# Patient Record
Sex: FEMALE | Race: WHITE | NOT HISPANIC OR LATINO | Employment: OTHER | URBAN - METROPOLITAN AREA
[De-identification: names, ages, dates, MRNs, and addresses within clinical notes are randomized per-mention and may not be internally consistent; named-entity substitution may affect disease eponyms.]

---

## 2017-01-10 ENCOUNTER — OFFICE VISIT (OUTPATIENT)
Dept: INTERNAL MEDICINE | Facility: CLINIC | Age: 72
End: 2017-01-10

## 2017-01-10 VITALS
HEART RATE: 61 BPM | TEMPERATURE: 98.8 F | BODY MASS INDEX: 29.89 KG/M2 | OXYGEN SATURATION: 97 % | WEIGHT: 158.3 LBS | DIASTOLIC BLOOD PRESSURE: 66 MMHG | HEIGHT: 61 IN | SYSTOLIC BLOOD PRESSURE: 144 MMHG

## 2017-01-10 DIAGNOSIS — J30.89 ALLERGIC RHINITIS DUE TO OTHER ALLERGIC TRIGGER, UNSPECIFIED RHINITIS SEASONALITY: Primary | ICD-10-CM

## 2017-01-10 PROBLEM — J30.9 ALLERGIC RHINITIS DUE TO ALLERGEN: Status: ACTIVE | Noted: 2017-01-10

## 2017-01-10 PROCEDURE — 99213 OFFICE O/P EST LOW 20 MIN: CPT | Performed by: FAMILY MEDICINE

## 2017-01-10 RX ORDER — FLUTICASONE PROPIONATE 50 MCG
2 SPRAY, SUSPENSION (ML) NASAL DAILY
Qty: 1 EACH | Refills: 0 | Status: SHIPPED | OUTPATIENT
Start: 2017-01-10 | End: 2017-02-09

## 2017-01-10 NOTE — PROGRESS NOTES
"Subjective   Sera Mccord is a 71 y.o. female.     Chief Complaint   Patient presents with   • patient has been hurting all over her body     since Sunday   • patient has had a headache since Sunday   • both ears feel \"stopped up\"         History of Present Illness patient has had above symptoms since Sunday no more headache and body ache however persistent head congestion she feels that it could be allergies she has not had any fevers does not take any statins because of unwanted cardiovascular side effects    The following portions of the patient's history were reviewed and updated as appropriate: allergies, current medications, past family history, past medical history, past social history, past surgical history and problem list.    Review of Systems   Constitutional: Positive for fatigue. Negative for activity change and unexpected weight change.   HENT: Positive for congestion, postnasal drip and sore throat. Negative for ear pain.    Eyes: Negative for pain and discharge.   Respiratory: Positive for cough. Negative for chest tightness, shortness of breath and wheezing.    Cardiovascular: Negative for chest pain and palpitations.   Gastrointestinal: Negative for abdominal pain, diarrhea and vomiting.   Endocrine: Negative.    Genitourinary: Negative.    Musculoskeletal: Negative for joint swelling.   Skin: Negative for color change, rash and wound.   Allergic/Immunologic: Negative.    Neurological: Negative for seizures and syncope.   Psychiatric/Behavioral: Negative.        Objective   Physical Exam   Constitutional: She is oriented to person, place, and time. She appears well-developed and well-nourished.  Non-toxic appearance. No distress.   HENT:   Head: Normocephalic and atraumatic. Hair is normal.   Right Ear: External ear normal. No drainage, swelling or tenderness. Tympanic membrane is retracted.   Left Ear: External ear normal. No drainage, swelling or tenderness. Tympanic membrane is retracted. " "  Nose: Mucosal edema present. No epistaxis.   Mouth/Throat: Uvula is midline and mucous membranes are normal. No oral lesions. No uvula swelling. Posterior oropharyngeal erythema present. No oropharyngeal exudate.   Eyes: Conjunctivae and EOM are normal. Pupils are equal, round, and reactive to light. Right eye exhibits no discharge. Left eye exhibits no discharge. No scleral icterus.   Neck: Normal range of motion. Neck supple.   Cardiovascular: Normal rate, regular rhythm and normal heart sounds.  Exam reveals no gallop.    No murmur heard.  Pulmonary/Chest: Breath sounds normal. No stridor. No respiratory distress. She has no wheezes. She has no rales. She exhibits no tenderness.   Lymphadenopathy:     She has no cervical adenopathy.   Neurological: She is alert and oriented to person, place, and time. She exhibits normal muscle tone.   Skin: Skin is warm and dry. No rash noted. She is not diaphoretic.   Psychiatric: She has a normal mood and affect. Her behavior is normal. Judgment and thought content normal.   Nursing note and vitals reviewed.      Assessment/Plan   Sera was seen today for patient has been hurting all over her body, patient has had a headache since sunday and both ears feel \"stopped up\".    Diagnoses and all orders for this visit:    Allergic rhinitis due to other allergic trigger, unspecified rhinitis seasonality    Other orders  -     fluticasone (FLONASE) 50 MCG/ACT nasal spray; 2 sprays into each nostril Daily for 30 days.        Follow-up if symptoms persist       "

## 2017-01-10 NOTE — MR AVS SNAPSHOT
Sera Mccord   1/10/2017 1:00 PM   Office Visit    Dept Phone:  [phone]   Encounter #:  [id number]    Provider:  Parmjit Tidwell MD   Department:  [facility] AND [facility] MED                Your Full Care Plan              Today's Medication Changes          These changes are accurate as of: 1/10/17  2:35 PM.  If you have any questions, ask your nurse or doctor.               New Medication(s)Ordered:     fluticasone 50 MCG/ACT nasal spray   Commonly known as:  FLONASE   2 sprays into each nostril Daily for 30 days.   Started by:  Parmjit Tidwell MD         Stop taking medication(s)listed here:     meloxicam 7.5 MG tablet   Commonly known as:  MOBIC   Stopped by:  Parmjit Tidwell MD                Where to Get Your Medications      These medications were sent to [address] [address] [facility] - [phone] [facility] [phone]   [address] [place], [place] [address]     Phone:  [phone]     fluticasone 50 MCG/ACT nasal spray                  Your Updated Medication List          This list is accurate as of: 1/10/17  2:35 PM.  Always use your most recent med list.                aspirin 325 MG tablet       fluticasone 50 MCG/ACT nasal spray   Commonly known as:  FLONASE   2 sprays into each nostril Daily for 30 days.       simvastatin 20 MG tablet   Commonly known as:  ZOCOR               You Were Diagnosed With        Codes Comments    Allergic rhinitis due to other allergic trigger, unspecified rhinitis seasonality    -  Primary ICD-10-CM: J30.89  ICD-9-CM: 477.8       Instructions     None    Patient Instructions History      Upcoming Appointments     Visit Type Date Time Department    OFFICE VISIT 1/10/2017  1:00 PM [facility] [place]    OFFICE VISIT 2/13/2017  9:30 AM [facility] [place]    FOLLOW UP 4/11/2017 11:40 AM [facility] SRIKANTH CHEST WO CONTRAST 6/14/2017 11:45 AM   [facility]    OFFICE VISIT 2017  1:30 PM [facility]      MyChart Signup     [facility] [employer] allows you to send messages to your doctor, view your test results, renew your prescriptions, schedule appointments, and more. To sign up, go to [email or web address] and click on the Sign Up Now link in the New User? box. Enter your [employer] Activation Code exactly as it appears below along with the last four digits of your Social Security Number and your Date of Birth () to complete the sign-up process. If you do not sign up before the expiration date, you must request a new code.    [employer] Activation Code: [email or web address]  Expires: 2017  2:35 PM    If you have questions, you can email [email or web address] or call [phone] to talk to our [employer] staff. Remember, [employer] is NOT to be used for urgent needs. For medical emergencies, dial 911.               Other Info from Your Visit           Your Appointments     2017  9:30 AM EST   Office Visit with Parmjit Tidwell MD   [facility] FAMILY AND INTERNAL MED (--)    [address]  [facility] [address] [phone]           Arrive 15 minutes prior to appointment.            2017 11:40 AM EDT   Follow Up with aMngo Linda MD   [facility] CARDIOLOGY (--)    [address]  [facility] [address] [phone]           Arrive 15 minutes prior to appointment.            2017 11:45 AM EDT   CT fang chest wo contrast with FANG CT 2   [facility] ([place])    [address]  [facility] [id number] [phone]           Bring current list of meds Please arrive 15 minutes prior to exam            2017  1:30 PM EDT   Office Visit with Filomena Contreras, APRN   [facility] THORACIC SURGERY (--)    [address]  [facility] [address] [phone]           Arrive 15 minutes prior to  "appointment.              Allergies     Mobic [Meloxicam]  Other (See Comments), GI Intolerance    increased heart rate and elevated blood pressure    Codeine Allergy Nausea And Vomiting    Levofloxacin Allergy Swelling    Sulfa Antibiotics Allergy Nausea And Vomiting      Reason for Visit     patient has been hurting all over her body since Sunday    patient has had a headache since Sunday     both ears feel \"stopped up\"           Vital Signs     Blood Pressure Pulse Temperature Height Weight Oxygen Saturation    144/66 (BP Location: Left arm, Patient Position: Sitting, Cuff Size: Large Adult) 61 98.8 °F (37.1 °C) (Oral) 61\" (154.9 cm) 158 lb 4.8 oz (71.8 kg) 97%    Breastfeeding? Body Mass Index Smoking Status             No 29.91 kg/m2 Never Smoker         Problems and Diagnoses Noted     Nasal inflammation due to allergen        "

## 2017-02-13 ENCOUNTER — OFFICE VISIT (OUTPATIENT)
Dept: INTERNAL MEDICINE | Facility: CLINIC | Age: 72
End: 2017-02-13

## 2017-02-13 VITALS
HEART RATE: 48 BPM | WEIGHT: 160.5 LBS | OXYGEN SATURATION: 98 % | SYSTOLIC BLOOD PRESSURE: 168 MMHG | DIASTOLIC BLOOD PRESSURE: 68 MMHG | BODY MASS INDEX: 30.3 KG/M2 | TEMPERATURE: 98.2 F | HEIGHT: 61 IN

## 2017-02-13 DIAGNOSIS — M79.7 FIBROMYALGIA: Primary | ICD-10-CM

## 2017-02-13 DIAGNOSIS — F41.9 ANXIETY: ICD-10-CM

## 2017-02-13 DIAGNOSIS — E78.2 MIXED HYPERLIPIDEMIA: ICD-10-CM

## 2017-02-13 PROCEDURE — 99214 OFFICE O/P EST MOD 30 MIN: CPT | Performed by: FAMILY MEDICINE

## 2017-02-13 RX ORDER — DULOXETIN HYDROCHLORIDE 60 MG/1
60 CAPSULE, DELAYED RELEASE ORAL DAILY
Qty: 30 CAPSULE | Refills: 6 | Status: SHIPPED | OUTPATIENT
Start: 2017-02-13 | End: 2017-02-24 | Stop reason: SINTOL

## 2017-02-13 RX ORDER — DULOXETIN HYDROCHLORIDE 30 MG/1
30 CAPSULE, DELAYED RELEASE ORAL DAILY
Qty: 15 CAPSULE | Refills: 0 | Status: SHIPPED | OUTPATIENT
Start: 2017-02-13 | End: 2017-02-13 | Stop reason: SDUPTHER

## 2017-02-13 RX ORDER — SIMVASTATIN 20 MG
20 TABLET ORAL NIGHTLY
Qty: 90 TABLET | Refills: 3 | Status: SHIPPED | OUTPATIENT
Start: 2017-02-13 | End: 2018-02-14 | Stop reason: ALTCHOICE

## 2017-02-13 NOTE — PROGRESS NOTES
Subjective   Sera Mccord is a 71 y.o. female.     Chief Complaint   Patient presents with   • follow up to fibromyalgia    hyperlipidemia, anxiety      Anxiety   Presents for initial visit. Onset was 1 to 5 years ago. The problem has been gradually worsening. Symptoms include decreased concentration, depressed mood, insomnia and irritability. Patient reports no chest pain, confusion, nervous/anxious behavior, palpitations or shortness of breath. Symptoms occur most days. The severity of symptoms is moderate. The symptoms are aggravated by family issues. The patient sleeps 4 hours per night. The quality of sleep is poor. Nighttime awakenings: several.     There are no known risk factors. Her past medical history is significant for CAD. Past treatments include nothing.    patient has history of syndrome X small coronary artery disease no beta blocker secondary to bradycardia is taking statin therapy and doing well trying to watch her diet also  No chest pain echocardiogram most recently ejection fraction greater than 60  She has chronic aches and pains with some intermittent osteoarthritis flareups she has difficulty taking anti-inflammatory she stopped the Mobic because of upset stomach she complains mostly of nighttime aches and pains wrist shoulders hips has some difficulty sleeping as a result she is stressed because of her daughter who's 48 has moved home with her son 18 years old smoker  The following portions of the patient's history were reviewed and updated as appropriate: allergies, current medications, past family history, past medical history, past social history, past surgical history and problem list.    Review of Systems   Constitutional: Positive for irritability. Negative for activity change, appetite change and unexpected weight change.   HENT: Negative for nosebleeds and trouble swallowing.    Eyes: Negative for pain and visual disturbance.   Respiratory: Negative for chest tightness, shortness  of breath and wheezing.    Cardiovascular: Negative for chest pain and palpitations.   Gastrointestinal: Negative for abdominal pain and blood in stool.   Endocrine: Negative.    Genitourinary: Negative for difficulty urinating and hematuria.   Musculoskeletal: Positive for arthralgias, myalgias and neck pain. Negative for gait problem and joint swelling.   Skin: Negative for color change and rash.   Allergic/Immunologic: Negative.    Neurological: Negative for syncope and speech difficulty.   Hematological: Negative for adenopathy.   Psychiatric/Behavioral: Positive for decreased concentration. Negative for agitation, confusion and dysphoric mood. The patient has insomnia. The patient is not nervous/anxious.    All other systems reviewed and are negative.      Objective   Physical Exam   Constitutional: She is oriented to person, place, and time. She appears well-developed and well-nourished.   HENT:   Head: Normocephalic and atraumatic.   Eyes: Conjunctivae are normal. Pupils are equal, round, and reactive to light.   Neck: Neck supple. No thyromegaly present.   Cardiovascular: Regular rhythm and normal pulses.  Bradycardia present.    Pulmonary/Chest: Effort normal and breath sounds normal. No respiratory distress. She has no wheezes.   Neurological: She is alert and oriented to person, place, and time.   Skin: Skin is warm and dry.   Psychiatric: She has a normal mood and affect. Her behavior is normal. Judgment and thought content normal.   Nursing note and vitals reviewed.      Assessment/Plan   Sera was seen today for follow up to fibromyalgia.    Diagnoses and all orders for this visit:    Fibromyalgia    Mixed hyperlipidemia    Anxiety    Other orders  -     Discontinue: DULoxetine (CYMBALTA) 30 MG capsule; Take 1 capsule by mouth Daily.  -     DULoxetine (CYMBALTA) 60 MG capsule; Take 1 capsule by mouth Daily.  -     simvastatin (ZOCOR) 20 MG tablet; Take 1 tablet by mouth Every Night.     follow-up 2  months benefit  of Cymbalta, then fasting lipid profile in June  Recommend Aleve 1 daily with dinner  Increase physical activity walking

## 2017-02-17 ENCOUNTER — HOSPITAL ENCOUNTER (EMERGENCY)
Facility: HOSPITAL | Age: 72
Discharge: HOME | End: 2017-02-17
Attending: FAMILY MEDICINE | Admitting: EMERGENCY MEDICINE

## 2017-02-17 ENCOUNTER — APPOINTMENT (OUTPATIENT)
Dept: GENERAL RADIOLOGY | Facility: HOSPITAL | Age: 72
End: 2017-02-17

## 2017-02-17 VITALS
TEMPERATURE: 98.8 F | OXYGEN SATURATION: 98 % | HEART RATE: 50 BPM | RESPIRATION RATE: 15 BRPM | HEIGHT: 61 IN | SYSTOLIC BLOOD PRESSURE: 162 MMHG | BODY MASS INDEX: 30.02 KG/M2 | WEIGHT: 159 LBS | DIASTOLIC BLOOD PRESSURE: 70 MMHG

## 2017-02-17 DIAGNOSIS — I10 ESSENTIAL HYPERTENSION: Primary | ICD-10-CM

## 2017-02-17 LAB
ALBUMIN SERPL-MCNC: 4.7 G/DL (ref 3.5–5.2)
ALBUMIN/GLOB SERPL: 1.7 G/DL
ALP SERPL-CCNC: 73 U/L (ref 39–117)
ALT SERPL W P-5'-P-CCNC: 17 U/L (ref 1–33)
ANION GAP SERPL CALCULATED.3IONS-SCNC: 12.9 MMOL/L
AST SERPL-CCNC: 20 U/L (ref 1–32)
BASOPHILS # BLD AUTO: 0.01 10*3/MM3 (ref 0–0.2)
BASOPHILS NFR BLD AUTO: 0.2 % (ref 0–1.5)
BILIRUB SERPL-MCNC: 0.5 MG/DL (ref 0.1–1.2)
BUN BLD-MCNC: 13 MG/DL (ref 8–23)
BUN/CREAT SERPL: 16.5 (ref 7–25)
CALCIUM SPEC-SCNC: 9.6 MG/DL (ref 8.6–10.5)
CHLORIDE SERPL-SCNC: 103 MMOL/L (ref 98–107)
CO2 SERPL-SCNC: 26.1 MMOL/L (ref 22–29)
CREAT BLD-MCNC: 0.79 MG/DL (ref 0.57–1)
DEPRECATED RDW RBC AUTO: 46.9 FL (ref 37–54)
EOSINOPHIL # BLD AUTO: 0.08 10*3/MM3 (ref 0–0.7)
EOSINOPHIL NFR BLD AUTO: 1.4 % (ref 0.3–6.2)
ERYTHROCYTE [DISTWIDTH] IN BLOOD BY AUTOMATED COUNT: 13.9 % (ref 11.7–13)
GFR SERPL CREATININE-BSD FRML MDRD: 72 ML/MIN/1.73
GLOBULIN UR ELPH-MCNC: 2.7 GM/DL
GLUCOSE BLD-MCNC: 116 MG/DL (ref 65–99)
HCT VFR BLD AUTO: 44.1 % (ref 35.6–45.5)
HGB BLD-MCNC: 14.8 G/DL (ref 11.9–15.5)
IMM GRANULOCYTES # BLD: 0 10*3/MM3 (ref 0–0.03)
IMM GRANULOCYTES NFR BLD: 0 % (ref 0–0.5)
LYMPHOCYTES # BLD AUTO: 1.44 10*3/MM3 (ref 0.9–4.8)
LYMPHOCYTES NFR BLD AUTO: 25.1 % (ref 19.6–45.3)
MCH RBC QN AUTO: 31 PG (ref 26.9–32)
MCHC RBC AUTO-ENTMCNC: 33.6 G/DL (ref 32.4–36.3)
MCV RBC AUTO: 92.5 FL (ref 80.5–98.2)
MONOCYTES # BLD AUTO: 0.37 10*3/MM3 (ref 0.2–1.2)
MONOCYTES NFR BLD AUTO: 6.4 % (ref 5–12)
NEUTROPHILS # BLD AUTO: 3.84 10*3/MM3 (ref 1.9–8.1)
NEUTROPHILS NFR BLD AUTO: 66.9 % (ref 42.7–76)
PLATELET # BLD AUTO: 232 10*3/MM3 (ref 140–500)
PMV BLD AUTO: 10.6 FL (ref 6–12)
POTASSIUM BLD-SCNC: 4 MMOL/L (ref 3.5–5.2)
PROT SERPL-MCNC: 7.4 G/DL (ref 6–8.5)
RBC # BLD AUTO: 4.77 10*6/MM3 (ref 3.9–5.2)
SODIUM BLD-SCNC: 142 MMOL/L (ref 136–145)
TROPONIN T SERPL-MCNC: <0.01 NG/ML (ref 0–0.03)
WBC NRBC COR # BLD: 5.74 10*3/MM3 (ref 4.5–10.7)

## 2017-02-17 PROCEDURE — 71020 HC CHEST PA AND LATERAL: CPT

## 2017-02-17 PROCEDURE — 85025 COMPLETE CBC W/AUTO DIFF WBC: CPT | Performed by: PHYSICIAN ASSISTANT

## 2017-02-17 PROCEDURE — 93005 ELECTROCARDIOGRAM TRACING: CPT

## 2017-02-17 PROCEDURE — 99284 EMERGENCY DEPT VISIT MOD MDM: CPT

## 2017-02-17 PROCEDURE — 80053 COMPREHEN METABOLIC PANEL: CPT | Performed by: PHYSICIAN ASSISTANT

## 2017-02-17 PROCEDURE — 84484 ASSAY OF TROPONIN QUANT: CPT | Performed by: PHYSICIAN ASSISTANT

## 2017-02-17 PROCEDURE — 93005 ELECTROCARDIOGRAM TRACING: CPT | Performed by: PHYSICIAN ASSISTANT

## 2017-02-17 PROCEDURE — 93010 ELECTROCARDIOGRAM REPORT: CPT | Performed by: INTERNAL MEDICINE

## 2017-02-17 RX ORDER — SODIUM CHLORIDE 0.9 % (FLUSH) 0.9 %
10 SYRINGE (ML) INJECTION AS NEEDED
Status: DISCONTINUED | OUTPATIENT
Start: 2017-02-17 | End: 2017-02-17 | Stop reason: HOSPADM

## 2017-02-17 RX ORDER — LISINOPRIL 10 MG/1
10 TABLET ORAL DAILY
Qty: 30 TABLET | Refills: 0 | Status: SHIPPED | OUTPATIENT
Start: 2017-02-17 | End: 2017-02-24

## 2017-02-17 NOTE — ED NOTES
States her bp was 171/90 and HR 87 ( states her normal HR is usually in the 40's), chest feels flushed     Frances Hdz RN  02/17/17 0702

## 2017-02-17 NOTE — ED PROVIDER NOTES
" EMERGENCY DEPARTMENT ENCOUNTER    CHIEF COMPLAINT  Chief Complaint: HTN  History given by: Patient  History limited by: Nothing  Room Number: 06/06  PMD: Parmjit Tidwell MD,  (Cardiology)      HPI:  Pt is a 71 y.o. female who presents complaining of hypertension. The patient reports that she first noticed that her BP was unusually elevated yesterday and her BP was 179/90 05:30 this morning. Pt states that she developed a  \"flushed\" sensation across her arms and anterior chest wall PTA so she came to the ED for further evaluation. She denies any chest pain, SOA, vomiting, HA, dysuria, numbness or tingling since onset. The patient had similar symptoms a few years ago when she failed a stress test, and the heart cath at that time showed no occlusions. The patient was started on Cymbalta three days ago by her PCP. No other complaints at this time.      Duration:  1 day  Onset: Gradual  Timing: Constant  Location: Anterior chest wall  Radiation: Bilateral arms  Quality: \"Flushed\"  Intensity/Severity: Mild  Progression: No Change  Associated Symptoms: HTN  Aggravating Factors: Unknown  Alleviating Factors: None  Previous Episodes: Yes, several years ago  Treatment before arrival: None mentioned     PAST MEDICAL HISTORY  Active Ambulatory Problems     Diagnosis Date Noted   • Arteriosclerosis of coronary artery 03/04/2016   • Arthritis 06/14/2016   • Foot pain 06/14/2016   • Histoplasmosis 06/14/2016   • HLD (hyperlipidemia) 06/14/2016   • Cannot sleep 06/14/2016   • Lung mass 06/14/2016   • LAD (lymphadenopathy), mediastinal 06/14/2016   • Plantar fasciitis 06/14/2016   • Restless leg 06/14/2016   • Fibromyalgia 12/13/2016   • Hyperglycemia 12/13/2016   • Allergic rhinitis due to allergen 01/10/2017   • Anxiety 02/13/2017     Resolved Ambulatory Problems     Diagnosis Date Noted   • No Resolved Ambulatory Problems     Past Medical History   Diagnosis Date   • Abnormal finding on lung imaging    • Coronary " artery disease    • Hemorrhoids    • Hyperlipemia    • Hyperlipidemia    • IBS (irritable bowel syndrome)    • Insomnia    • Mediastinal lymphadenopathy    • Night sweat    • Restless leg syndrome    • Sore throat        PAST SURGICAL HISTORY  Past Surgical History   Procedure Laterality Date   • Upper gastrointestinal endoscopy  11/2015     Dr. Lauren Reich   • Cardiac catheterization  2015   • Colonoscopy w/ polypectomy  2013     Dr. Lauren Reich   • Colonoscopy N/A 10/24/2016     Procedure: COLONOSCOPY;  Surgeon: Lauren Reich MD;  Location: [facility] ENDOSCOPY;  Service:        FAMILY HISTORY  Family History   Problem Relation Age of Onset   • Hypertension Mother    • Heart disease Mother    • Heart disease Father    • Heart attack Father    • Skin cancer Maternal Grandmother    • No Known Problems Maternal Grandfather    • Arthritis Paternal Grandmother    • Heart disease Paternal Grandfather        SOCIAL HISTORY  Social History     Social History   • Marital status:      Spouse name: N/A   • Number of children: N/A   • Years of education: N/A     Occupational History   • Not on file.     Social History Main Topics   • Smoking status: Never Smoker   • Smokeless tobacco: Never Used   • Alcohol use No   • Drug use: No   • Sexual activity: Defer     Other Topics Concern   • Not on file     Social History Narrative       ALLERGIES  Mobic [meloxicam]; Codeine; Levofloxacin; and Sulfa antibiotics    REVIEW OF SYSTEMS  Review of Systems   Constitutional: Negative for chills and fatigue.   HENT: Negative for congestion, rhinorrhea and sore throat.    Eyes: Negative for pain.   Respiratory: Negative for cough, shortness of breath and wheezing.    Cardiovascular: Negative for chest pain, palpitations and leg swelling.   Gastrointestinal: Negative for abdominal pain, diarrhea, nausea and vomiting.   Genitourinary: Negative for difficulty urinating, dysuria, flank pain and frequency.   Musculoskeletal: Negative for  arthralgias, myalgias, neck pain and neck stiffness.   Skin: Negative for rash.   Neurological: Negative for dizziness, speech difficulty, weakness, light-headedness, numbness and headaches.   Psychiatric/Behavioral: Negative.    All other systems reviewed and are negative.      PHYSICAL EXAM  ED Triage Vitals   Temp Heart Rate Resp BP SpO2   02/17/17 0702 02/17/17 0702 02/17/17 0704 02/17/17 0708 02/17/17 0702   98.8 °F (37.1 °C) 92 15 211/90 98 %      Temp src Heart Rate Source Patient Position BP Location FiO2 (%)   -- -- -- -- --              Physical Exam   Constitutional: She is oriented to person, place, and time and well-developed, well-nourished, and in no distress. No distress.   HENT:   Head: Normocephalic.   Eyes: EOM are normal. Pupils are equal, round, and reactive to light.   Neck: Normal range of motion.   Cardiovascular: Regular rhythm.  Bradycardia present.    No murmur heard.  Pulmonary/Chest: Effort normal and breath sounds normal. No respiratory distress.   Abdominal: Soft. There is no tenderness. There is no rebound and no guarding.   Musculoskeletal: Normal range of motion. She exhibits no edema.   Neurological: She is alert and oriented to person, place, and time.   No neuro deficits    Skin: Skin is warm and dry. No rash noted.   No pedal edema.   Psychiatric: Mood and affect normal.   Nursing note and vitals reviewed.      LAB RESULTS  Lab Results (last 24 hours)     Procedure Component Value Units Date/Time    CBC & Differential [[id number]] Collected:  02/17/17 0814    Specimen:  Blood Updated:  02/17/17 0823    Narrative:       The following orders were created for panel order CBC & Differential.  Procedure                               Abnormality         Status                     ---------                               -----------         ------                     CBC Auto Differential[[id number]]         Abnormal            Final result                 Please view results for these  tests on the individual orders.    Comprehensive Metabolic Panel [[id number]]  (Abnormal) Collected:  02/17/17 0814    Specimen:  Blood Updated:  02/17/17 0850     Glucose 116 (H) mg/dL      BUN 13 mg/dL      Creatinine 0.79 mg/dL      Sodium 142 mmol/L      Potassium 4.0 mmol/L      Chloride 103 mmol/L      CO2 26.1 mmol/L      Calcium 9.6 mg/dL      Total Protein 7.4 g/dL      Albumin 4.70 g/dL      ALT (SGPT) 17 U/L      AST (SGOT) 20 U/L      Alkaline Phosphatase 73 U/L      Total Bilirubin 0.5 mg/dL      eGFR Non African Amer 72 mL/min/1.73      Globulin 2.7 gm/dL      A/G Ratio 1.7 g/dL      BUN/Creatinine Ratio 16.5      Anion Gap 12.9 mmol/L     Troponin [[id number]]  (Normal) Collected:  02/17/17 0814    Specimen:  Blood Updated:  02/17/17 0850     Troponin T <0.010 ng/mL     Narrative:       Troponin T Reference Ranges:  Less than 0.03 ng/mL:    Negative for AMI  0.03 to 0.09 ng/mL:      Indeterminant for AMI  Greater than 0.09 ng/mL: Positive for AMI    CBC Auto Differential [[id number]]  (Abnormal) Collected:  02/17/17 0814    Specimen:  Blood Updated:  02/17/17 0823     WBC 5.74 10*3/mm3      RBC 4.77 10*6/mm3      Hemoglobin 14.8 g/dL      Hematocrit 44.1 %      MCV 92.5 fL      MCH 31.0 pg      MCHC 33.6 g/dL      RDW 13.9 (H) %      RDW-SD 46.9 fl      MPV 10.6 fL      Platelets 232 10*3/mm3      Neutrophil % 66.9 %      Lymphocyte % 25.1 %      Monocyte % 6.4 %      Eosinophil % 1.4 %      Basophil % 0.2 %      Immature Grans % 0.0 %      Neutrophils, Absolute 3.84 10*3/mm3      Lymphocytes, Absolute 1.44 10*3/mm3      Monocytes, Absolute 0.37 10*3/mm3      Eosinophils, Absolute 0.08 10*3/mm3      Basophils, Absolute 0.01 10*3/mm3      Immature Grans, Absolute 0.00 10*3/mm3           I ordered the above labs and reviewed the results    RADIOLOGY  XR Chest 2 View   Final Result         08:56  Reviewed CXR - Chronic ill-defined opacification right upper lung is evaluated to better  advantage on CT scan. I  see no other abnormality, no focal airspace disease pleural fluid or other acute process. Independently viewed by me. Interpreted by radiologist.    I ordered the above noted radiological studies. Interpreted by radiologist.  Reviewed by me in PACS.       PROCEDURES  Procedures    EKG           EKG time: 08:20  Rhythm/Rate: SB 45  P waves and CA: Normal  QRS, axis: Boarder line LAD   ST and T waves: Non specific T wave changes in anterior leads       Interpreted Contemporaneously by me, independently viewed  Unchanged compared to prior 9/27/16      PROGRESS AND CONSULTS  ED Course     08:09  Ordered EKG, Labs and CXR for further evaluation    09:02  Rechecked patient and they are resting comfortably. The patient's flushed sensation has resolved. Discussed pertinent lab and imaging results, including her CXR, labs and EKG show nothing acute. Patient agrees with plan and all questions were addressed.     09:26  Discussed case with  and he agrees with the treatment plan. He would like me to contact her PCP.    09:49  Discussed case with Dr. Tidwell (PCP). He recommended that she stop Cymbalta and start Lisinopril 10 mg.  will see her in f/u next week. Reviewed history, exam, results and treatments.       10:00  Rechecked patient and they are resting comfortably. /73. I explained that I spoke with  and he recommended stopping Cymbalta and possibly starting her on a low dose of Lisinopril. Discussed the patient's pertinent labs and imaging results, including negative CXR, EKG and Labs. Discussed plan to discharge the patient home and recommended follow up with  (PCP) next week. Patient agrees with the plan and all questions were addressed.     Latest vital signs   BP- 162/70 HR- (!) 49 Temp- 98.8 °F (37.1 °C) O2 sat- 97%      MEDICAL DECISION MAKING  Results were reviewed/discussed with the patient and they were also made aware of online access. Pt also made aware that some labs, such  as cultures, will not be resulted during ER visit and follow up with PMD is necessary.     MDM  Number of Diagnoses or Management Options  Essential hypertension:   Diagnosis management comments: No evidence of ischemia or arrhythmia.         Amount and/or Complexity of Data Reviewed  Tests in the radiology section of CPT®: ordered and reviewed  Decide to obtain previous medical records or to obtain history from someone other than the patient: yes  Review and summarize past medical records: yes (Cardiac cath 1/15/2015 which was normal. She also had a normal echo at that time as well. Positive cardiac stress test. Negative echo 10/17/16. )           DIAGNOSIS  Final diagnoses:   Essential hypertension       DISPOSITION  DISCHARGE    Patient discharged in stable condition.    Reviewed implications of results, diagnosis, meds, responsibility to follow up, warning signs and symptoms of possible worsening, potential complications and reasons to return to ER, including worsening chest discomfort.    Patient/Family voiced understanding of above instructions.    Discussed plan for discharge, as there is no emergent indication for admission.  Pt/family is agreeable and understands need for follow up and repeat testing.  Pt is aware that discharge does not mean that nothing is wrong but it indicates no emergency is present that requires admission and they must continue care with follow-up as given below or physician of their choice.     FOLLOW-UP  Parmjit Tidwell MD  [id number] [id number] [phone]      call for appointment next week to check BP         Medication List      New Prescriptions          lisinopril 10 MG tablet   Commonly known as:  PRINIVIL,ZESTRIL   Take 1 tablet by mouth Daily.         Stop          DULoxetine 60 MG capsule   Commonly known as:  CYMBALTA       TURMERIC PO          Latest Documented Vital Signs:  As of 10:11 AM  BP- 162/70 HR- 50 Temp- 98.8 °F (37.1 °C) O2 sat-  98%    --  Documentation assistance provided by lars Soriano for Ricco Zamarripa PA-C.  Information recorded by the scribe was done at my direction and has been verified and validated by me.             Deangelo Soriano  02/17/17 1012       KATHIA Mcarthur  02/17/17 1019

## 2017-02-17 NOTE — ED PROVIDER NOTES
Pt presents to ED complaining of hypertension with a BP of 179/90 this morning. Pt also complains of HA. Pt states she has never had high BP before, so she does not take hypertension medicine. Dr. Tidwell (PCP) started pt on Cymbalta 3 days ago, but she denies recent decongestant use. Her most recent BP in the ED was 170/84. Her HR is in the 40's. Upon exam, pt is awake and alert in NAD. Heart and lungs are normal. Abd is benign. We will consult her PCP.     I supervised care provided by the midlevel provider.    We have discussed this patient's history, physical exam, and treatment plan.   I have reviewed the note and personally saw and examined the patient and agree with the plan of care.    Documentation assistance provided by lars Harris.  Information recorded by the scribe was done at my direction and has been verified and validated by me.       Michaela Harris  02/17/17 1008       Jonathan Epstein MD  02/17/17 1015

## 2017-02-20 ENCOUNTER — TELEPHONE (OUTPATIENT)
Dept: SOCIAL WORK | Facility: HOSPITAL | Age: 72
End: 2017-02-20

## 2017-02-24 ENCOUNTER — OFFICE VISIT (OUTPATIENT)
Dept: INTERNAL MEDICINE | Facility: CLINIC | Age: 72
End: 2017-02-24

## 2017-02-24 VITALS
SYSTOLIC BLOOD PRESSURE: 142 MMHG | HEART RATE: 53 BPM | DIASTOLIC BLOOD PRESSURE: 88 MMHG | TEMPERATURE: 98.5 F | OXYGEN SATURATION: 98 % | WEIGHT: 157 LBS | BODY MASS INDEX: 29.64 KG/M2 | HEIGHT: 61 IN

## 2017-02-24 DIAGNOSIS — R00.1 BRADYCARDIA: ICD-10-CM

## 2017-02-24 DIAGNOSIS — F41.9 ANXIETY: Primary | ICD-10-CM

## 2017-02-24 DIAGNOSIS — M79.7 FIBROMYALGIA: ICD-10-CM

## 2017-02-24 DIAGNOSIS — E78.2 MIXED HYPERLIPIDEMIA: ICD-10-CM

## 2017-02-24 PROCEDURE — 99214 OFFICE O/P EST MOD 30 MIN: CPT | Performed by: FAMILY MEDICINE

## 2017-02-24 RX ORDER — AMLODIPINE BESYLATE 5 MG/1
5 TABLET ORAL DAILY
Qty: 30 TABLET | Refills: 6 | Status: SHIPPED | OUTPATIENT
Start: 2017-02-24 | End: 2017-06-16 | Stop reason: SDUPTHER

## 2017-02-24 RX ORDER — AMLODIPINE BESYLATE 5 MG/1
5 TABLET ORAL DAILY
COMMUNITY
End: 2017-02-24 | Stop reason: SDUPTHER

## 2017-02-24 NOTE — PROGRESS NOTES
Subjective   Sera Mccord is a 71 y.o. female.     Chief Complaint   Patient presents with   • following up on hypertension     hospital follow up due toelevated blood pressure 2/17/17         History of Present Illness   Patient went to Hospital to 17 hot feeling chest pain with arm pain bilaterally elevated blood pressure she had been on the Cymbalta for 1 week prior to that.  Discharged with lisinopril prescription changed to amlodipine at a pharmacy history of allergic reaction to lisinopril which the patient does not know what it was.  Records were reviewed from [facility]  She is continuing to take the simvastatin for hyperlipidemia and has stopped taking Cymbalta  The following portions of the patient's history were reviewed and updated as appropriate: allergies, current medications, past family history, past medical history, past social history, past surgical history and problem list.    Review of Systems   Constitutional: Negative for activity change, appetite change and unexpected weight change.   HENT: Negative for nosebleeds and trouble swallowing.    Eyes: Negative for pain and visual disturbance.   Respiratory: Negative for chest tightness, shortness of breath and wheezing.    Cardiovascular: Negative for chest pain and palpitations.   Gastrointestinal: Negative for abdominal pain and blood in stool.   Endocrine: Negative.    Genitourinary: Negative for difficulty urinating and hematuria.   Musculoskeletal: Negative for arthralgias, gait problem, joint swelling, myalgias and neck pain.   Skin: Negative for color change and rash.   Allergic/Immunologic: Negative.    Neurological: Negative for syncope and speech difficulty.   Hematological: Negative for adenopathy.   Psychiatric/Behavioral: Negative for agitation, confusion and dysphoric mood. The patient is nervous/anxious.    All other systems reviewed and are negative.      Objective   Physical Exam   Constitutional: She is oriented to person,  place, and time. She appears well-developed and well-nourished.   HENT:   Head: Normocephalic and atraumatic.   Eyes: Conjunctivae are normal. Pupils are equal, round, and reactive to light.   Neck: Neck supple. No thyromegaly present.   Cardiovascular: Regular rhythm and normal pulses.  Bradycardia present.    Pulmonary/Chest: Effort normal and breath sounds normal. No respiratory distress. She has no wheezes.   Neurological: She is alert and oriented to person, place, and time.   Skin: Skin is warm and dry.   Psychiatric: She has a normal mood and affect. Her behavior is normal. Judgment and thought content normal.   Nursing note and vitals reviewed.      Assessment/Plan   Sera was seen today for following up on hypertension.    Diagnoses and all orders for this visit:    Anxiety    Fibromyalgia    Mixed hyperlipidemia    Bradycardia    Other orders  -     amLODIPine (NORVASC) 5 MG tablet; Take 1 tablet by mouth Daily.      Patient will follow-up in one month but her blood pressure goals less than 140/90 minute that time restart the Cymbalta to help because of her anxiety and fibromyalgia symptoms continue the simvastatin at 20 mg for hyperlipidemia  She's been released from cardiology

## 2017-03-24 ENCOUNTER — OFFICE VISIT (OUTPATIENT)
Dept: INTERNAL MEDICINE | Facility: CLINIC | Age: 72
End: 2017-03-24

## 2017-03-24 VITALS
SYSTOLIC BLOOD PRESSURE: 128 MMHG | WEIGHT: 158.8 LBS | HEART RATE: 50 BPM | OXYGEN SATURATION: 98 % | BODY MASS INDEX: 29.98 KG/M2 | TEMPERATURE: 98.1 F | DIASTOLIC BLOOD PRESSURE: 62 MMHG | HEIGHT: 61 IN

## 2017-03-24 DIAGNOSIS — M79.7 FIBROMYALGIA: ICD-10-CM

## 2017-03-24 DIAGNOSIS — I10 ESSENTIAL HYPERTENSION: ICD-10-CM

## 2017-03-24 DIAGNOSIS — E78.2 MIXED HYPERLIPIDEMIA: Primary | ICD-10-CM

## 2017-03-24 PROCEDURE — 99214 OFFICE O/P EST MOD 30 MIN: CPT | Performed by: FAMILY MEDICINE

## 2017-03-24 RX ORDER — MELATONIN
2000 DAILY
COMMUNITY
End: 2018-01-22

## 2017-03-24 NOTE — PROGRESS NOTES
Subjective   Sera Mccord is a 71 y.o. female.     Chief Complaint   Patient presents with   • following up hypertension    fibromyalgia  Hyperlipidemia    History of Present Illness has been checking her blood pressure in the morning and seems to be elevated in the 160s range after she takes amlodipine 3 hours later her blood pressures back down in the 1/21/30 range she has no chest pain shortness of breath or increase fatigue swelling seems to accumulate in her lower extremities since the day progresses is not as bad as it has been in the past but she just wanted to make note of the face had many intolerances to medications in the past due to leg swelling.  She is trying to increase her activity to help treat fibromyalgia since she cannot take anti-inflammatory sore SSRIs she is also watching her diet and continuing the statin therapy for cholesterol management she has no muscle aches of the usual trigger point tenderness    The following portions of the patient's history were reviewed and updated as appropriate: allergies, current medications, past family history, past medical history, past social history, past surgical history and problem list.    Review of Systems   Constitutional: Negative.    HENT: Negative.    Eyes: Negative.    Respiratory: Negative.    Cardiovascular: Negative.    Gastrointestinal: Negative.    Genitourinary: Negative.    Hematological: Negative.    Psychiatric/Behavioral: Negative.        Objective   Physical Exam   Constitutional: She is oriented to person, place, and time. She appears well-developed and well-nourished.   HENT:   Head: Normocephalic and atraumatic.   Eyes: Conjunctivae are normal. Pupils are equal, round, and reactive to light.   Neck: Neck supple. No thyromegaly present.   Cardiovascular: Regular rhythm and normal pulses.  Bradycardia present.    Pulmonary/Chest: Effort normal and breath sounds normal. No respiratory distress. She has no wheezes.   Musculoskeletal:  Normal range of motion.   Trace edema in ankles bilaterally   Neurological: She is alert and oriented to person, place, and time.   Skin: Skin is warm and dry.   Psychiatric: She has a normal mood and affect. Her behavior is normal. Judgment and thought content normal.   Nursing note and vitals reviewed.      Assessment/Plan   Sera was seen today for following up hypertension.    Diagnoses and all orders for this visit:    Mixed hyperlipidemia    Essential hypertension    Fibromyalgia      Adjustment medication of amlodipine half pill twice a day every 12 hours instead of all and wants this will reduce her swelling as well as better blood pressure control  Patient will call in 2 weeks to determine if any better control of her blood pressure otherwise follow up 6 months laboratory evaluation at that time fasting CMP and lipid

## 2017-04-18 ENCOUNTER — OFFICE VISIT (OUTPATIENT)
Dept: CARDIOLOGY | Facility: CLINIC | Age: 72
End: 2017-04-18

## 2017-04-18 VITALS
BODY MASS INDEX: 29.45 KG/M2 | DIASTOLIC BLOOD PRESSURE: 80 MMHG | OXYGEN SATURATION: 91 % | SYSTOLIC BLOOD PRESSURE: 158 MMHG | HEART RATE: 46 BPM | WEIGHT: 156 LBS | HEIGHT: 61 IN

## 2017-04-18 DIAGNOSIS — I48.0 PAROXYSMAL ATRIAL FIBRILLATION (HCC): ICD-10-CM

## 2017-04-18 DIAGNOSIS — I10 ESSENTIAL HYPERTENSION: Primary | ICD-10-CM

## 2017-04-18 PROCEDURE — 93000 ELECTROCARDIOGRAM COMPLETE: CPT | Performed by: PHYSICIAN ASSISTANT

## 2017-04-18 PROCEDURE — 99213 OFFICE O/P EST LOW 20 MIN: CPT | Performed by: PHYSICIAN ASSISTANT

## 2017-04-18 NOTE — PROGRESS NOTES
Date of Office Visit: 2017  Encounter Provider: KATHIA Luciano  Place of Service: [facility] CARDIOLOGY  Patient Name: Sera Mccord  :1945    Chief Complaint   Patient presents with   • Hypertension     2 month ED follow up   :     HPI: Sera Mccord is a 71 y.o. female who presents today for Follow-up.  Old records have been obtained and reviewed by me.  She is a patient of Dr. Linda's who he follows for Syndrome X. In the past, she has not been able to tolerate metoprolol. She was in our office on 3/4/2016 to see Dr. Linda.  At that time, she was doing well and denied any chest pain or shortness of breath. She presented to the emergency room on 2016 with complaints of palpitations after eating lunch that lasted for 1-2 hours. She described a feeling as a flutter, and by the time she got to the ER it had resolved. She also complained of a headache and an episode of vertigo that occurred the night before her ER presentation. She also complained of chills and generalized weakness. When she presented to the emergency room, she was found to be in atrial fibrillation with a heart rate in the 130s. She converted back to regular rhythm while she was in the emergency room. It was recommended that she take aspirin 325 mg daily and follow-up with us in our office. They did place a Zio patch monitor on her in the emergency room.   She then saw me on 2016.  At that visit, she denied any more episodes of heart palpitations.  Her blood pressure, which she was checking regularly at home, was normal.  At that visit, she denied alcohol or coffee consumption.  However, she admitted that she and her  had been drinking quite a bit of sweet tea over the past few months.  Her chads 2 vascular score was 0, which was considered low risk.  She was sinus bradycardia at that visit, and I did not start a beta blocker.  I did check a thyroid panel, which was normal.  She  had discontinued her Imdur secondary to leg swelling, and fortunately she remained chest pain-free.     She then followed up with Dr. Linda on 10/17/2016.  At that visit, she was hypertensive.  He remarked that he felt her CHADS-VASc2 score was at least 2, maybe 3.  He felt she needed to be anticoagulated, and recommended Xarelto.  She was going to wait until after her upcoming colonoscopy.  He did have an echocardiogram on 10/25/2016 which showed normal LV function with an EF of 66% and mild mitral regurgitation.     She then presented to the emergency department on 2/17/2017 with elevated blood pressure at 179/90 that was associated with a headache.  Her blood pressure in the emergency room was 211/90.  She had recently started Cymbalta.  She was advised to discontinue her Cymbalta and start lisinopril 10 mg daily, which was subsequently changed to Norvasc 5 mg daily because of a prior allergy to lisinopril (the patient did not know what it was).   She is here today because she was told to follow up with us after her ER visit for hypertension over 2 months ago.  She never did start the Xarelto.  She does not think that she has had anymore episodes of atrial fibrillation.  She feels like she has atrial fibrillation about once a year, usually in the spring.  The patient is not sure why she did not start the Xarelto.  She did have a colonoscopy, and according to the patient there were no abnormalities and no bleeding.  Her GI bleeding was felt to be from hemorrhoids, and it has resolved.  She did complain of some shortness of breath on exertion, and my medical assistant did walking oximetry on her.  She did drop to 91% with ambulation, and she was 99% on room air.  She has elevated blood pressure today, however she did not take her medications this morning for some reason.        Past Medical History:   Diagnosis Date   • Abnormal finding on lung imaging    • Arthritis     DJD Multiple joints spine, shoulders,  knees, left hip. December 2013 normal joint examination. Patient has 6 positive fibromyalgia tender points.   • Coronary artery disease     12/14 markedly abnl stress and nl cath; poss syndrome x  felt terrible with metoprolol   • Fibromyalgia    • Foot pain    • Hemorrhoids     internal and external   • Histoplasmosis    • Hyperlipemia    • Hyperlipidemia    • IBS (irritable bowel syndrome)    • Insomnia     affected by burning in legs and also pain in right ear and occipit   • Lung mass    • Mediastinal lymphadenopathy    • Night sweat    • Plantar fasciitis     12/13 right side   • Restless leg syndrome    • Sore throat        Past Surgical History:   Procedure Laterality Date   • CARDIAC CATHETERIZATION  2015   • COLONOSCOPY N/A 10/24/2016    Procedure: COLONOSCOPY;  Surgeon: Lauren Reich MD;  Location: [facility] ENDOSCOPY;  Service:    • COLONOSCOPY W/ POLYPECTOMY  2013    Dr. Lauren Reich   • UPPER GASTROINTESTINAL ENDOSCOPY  11/2015    Dr. Lauren Reich       Social History     Social History   • Marital status:      Spouse name: N/A   • Number of children: N/A   • Years of education: N/A     Occupational History   • Not on file.     Social History Main Topics   • Smoking status: Never Smoker   • Smokeless tobacco: Never Used   • Alcohol use No   • Drug use: No   • Sexual activity: Defer     Other Topics Concern   • Not on file     Social History Narrative       Family History   Problem Relation Age of Onset   • Hypertension Mother    • Heart disease Mother    • Heart disease Father    • Heart attack Father    • Skin cancer Maternal Grandmother    • No Known Problems Maternal Grandfather    • Arthritis Paternal Grandmother    • Heart disease Paternal Grandfather        Review of Systems   Constitution: Negative for chills, fever, malaise/fatigue, weight gain and weight loss.   HENT: Negative for ear pain, headaches, hearing loss, nosebleeds and sore throat.    Eyes: Negative for double vision, pain and  "visual disturbance.   Cardiovascular: Positive for dyspnea on exertion and leg swelling. Negative for chest pain, irregular heartbeat, near-syncope, orthopnea, palpitations, paroxysmal nocturnal dyspnea and syncope.   Respiratory: Negative for cough, shortness of breath, sleep disturbances due to breathing, snoring and wheezing.    Endocrine: Negative for cold intolerance, heat intolerance and polyuria.   Skin: Negative for itching and rash.   Musculoskeletal: Negative for joint pain, joint swelling and myalgias.   Gastrointestinal: Negative for abdominal pain, diarrhea, melena, nausea and vomiting.   Genitourinary: Negative for frequency, hematuria and hesitancy.   Neurological: Negative for excessive daytime sleepiness, light-headedness, numbness, paresthesias and seizures.   Psychiatric/Behavioral: Negative for altered mental status and depression.   Allergic/Immunologic: Negative.    All other systems reviewed and are negative.      Allergies   Allergen Reactions   • Duloxetine      legweling   • Lisinopril      Leg sweling   • Mobic [Meloxicam] Other (See Comments) and GI Intolerance     increased heart rate and elevated blood pressure   • Codeine Nausea And Vomiting   • Levofloxacin Swelling   • Sulfa Antibiotics Nausea And Vomiting         Current Outpatient Prescriptions:   •  amLODIPine (NORVASC) 5 MG tablet, Take 1 tablet by mouth Daily., Disp: 30 tablet, Rfl: 6  •  aspirin 325 MG tablet, Take 325 mg by mouth Daily., Disp: , Rfl:   •  cholecalciferol (VITAMIN D3) 1000 UNITS tablet, Take 1,000 Units by mouth. Two tabs a day, Disp: , Rfl:   •  simvastatin (ZOCOR) 20 MG tablet, Take 1 tablet by mouth Every Night., Disp: 90 tablet, Rfl: 3  •  TURMERIC PO, Take 1 teaspoon(s) by mouth Daily As Needed., Disp: , Rfl:      Objective:     Vitals:    04/18/17 1500 04/18/17 1512   BP: 152/70 158/80   BP Location: Right arm Left arm   Pulse: (!) 46    SpO2: 99% 91%   Weight: 156 lb (70.8 kg)    Height: 61\" (154.9 cm)  "     Body mass index is 29.48 kg/(m^2).    PHYSICAL EXAM:    Physical Exam   Constitutional: She is oriented to person, place, and time. She appears well-developed and well-nourished. No distress.   HENT:   Head: Normocephalic and atraumatic.   Eyes: Pupils are equal, round, and reactive to light.   Neck: No JVD present. No thyromegaly present.   Cardiovascular: Normal rate, regular rhythm, normal heart sounds and intact distal pulses.    No murmur heard.  Pulmonary/Chest: Effort normal and breath sounds normal. No respiratory distress.   Abdominal: Soft. Bowel sounds are normal. She exhibits no distension. There is no splenomegaly or hepatomegaly. There is no tenderness.   Musculoskeletal: Normal range of motion. She exhibits no edema.   Neurological: She is alert and oriented to person, place, and time.   Skin: Skin is warm and dry. She is not diaphoretic. No erythema.   Psychiatric: She has a normal mood and affect. Her behavior is normal. Judgment normal.         ECG 12 Lead  Date/Time: 4/18/2017 3:45 PM  Performed by: URMILA PUGH.  Authorized by: URMILA PUGH.   Comparison: compared with previous ECG from 2/17/2017  Similar to previous ECG  Rhythm: sinus bradycardia  BPM: 46  Clinical impression: normal ECG  Comments: Indication: Paroxysmal atrial fibrillation.              Assessment:       Diagnosis Plan   1. Essential hypertension     2. Paroxysmal atrial fibrillation  ECG 12 Lead     Orders Placed This Encounter   Procedures   • ECG 12 Lead     This order was created via procedure documentation          Plan:       1.  Atrial Fibrillation and Atrial Flutter  Assessment  • The patient has paroxysmal atrial fibrillation  • This is non-valvular in etiology  • The patient's CHADS2-VASc score is 3  • A [id number] score of 2 or more is considered a high risk for a thromboembolic event  • Aspirin prescribed    Plan  • Attempt to maintain sinus rhythm  • Add rivaroxaban for antithrombotic  therapy    Subjective - Objective  • She does have a CHADs-VASc2 score of 3.  According to the patient, she has had multiple episodes of atrial fibrillation over the years.  She does not have coronary disease.  I'm going to discontinue her aspirin and start her on Xarelto 20 mg daily.  I think that her risk of stroke out ways her risk of bleeding.  I did discuss with her the risk of bleeding and the need to be mindful that she is on a strong anticoagulant.  I know that these medications can be expensive, and I've asked her to continue the aspirin until she starts the Xarelto.  If she has any difficulty with the cost of the medication, she will call our office and we will try to help her out.    2.  Hypertension.  Her blood pressure is elevated today at 158/80, however she did not take her medications.  She is going to follow-up with Dr. Tidwell about this.    She will follow-up with Dr. Linda in 6 months or sooner if needed.      As always, it has been a pleasure to participate in your patient's care.      Sincerely,         Damaris Morse PA-C

## 2017-05-12 ENCOUNTER — TELEPHONE (OUTPATIENT)
Dept: INTERNAL MEDICINE | Facility: CLINIC | Age: 72
End: 2017-05-12

## 2017-05-15 ENCOUNTER — OFFICE VISIT (OUTPATIENT)
Dept: INTERNAL MEDICINE | Facility: CLINIC | Age: 72
End: 2017-05-15

## 2017-05-15 VITALS
TEMPERATURE: 98.5 F | WEIGHT: 156.6 LBS | HEIGHT: 61 IN | OXYGEN SATURATION: 98 % | DIASTOLIC BLOOD PRESSURE: 66 MMHG | HEART RATE: 51 BPM | SYSTOLIC BLOOD PRESSURE: 168 MMHG | BODY MASS INDEX: 29.57 KG/M2

## 2017-05-15 DIAGNOSIS — E78.2 MIXED HYPERLIPIDEMIA: ICD-10-CM

## 2017-05-15 DIAGNOSIS — Z00.00 MEDICARE ANNUAL WELLNESS VISIT, SUBSEQUENT: ICD-10-CM

## 2017-05-15 DIAGNOSIS — I10 ESSENTIAL HYPERTENSION: Primary | ICD-10-CM

## 2017-05-15 DIAGNOSIS — M79.7 FIBROMYALGIA: ICD-10-CM

## 2017-05-15 PROCEDURE — 99214 OFFICE O/P EST MOD 30 MIN: CPT | Performed by: FAMILY MEDICINE

## 2017-05-15 PROCEDURE — G0439 PPPS, SUBSEQ VISIT: HCPCS | Performed by: FAMILY MEDICINE

## 2017-05-15 RX ORDER — LOSARTAN POTASSIUM 25 MG/1
25 TABLET ORAL DAILY
Qty: 30 TABLET | Refills: 6 | Status: SHIPPED | OUTPATIENT
Start: 2017-05-15 | End: 2017-06-09 | Stop reason: SINTOL

## 2017-05-16 LAB
ANA SER QL: NEGATIVE
CHOLEST SERPL-MCNC: 195 MG/DL (ref 0–200)
ERYTHROCYTE [SEDIMENTATION RATE] IN BLOOD BY WESTERGREN METHOD: 5 MM/HR (ref 0–30)
HCV AB S/CO SERPL IA: <0.1 S/CO RATIO (ref 0–0.9)
HDLC SERPL-MCNC: 75 MG/DL (ref 40–60)
LDLC SERPL CALC-MCNC: 87 MG/DL (ref 0–100)
TRIGL SERPL-MCNC: 166 MG/DL (ref 0–150)
VLDLC SERPL CALC-MCNC: 33.2 MG/DL (ref 5–40)

## 2017-05-18 ENCOUNTER — TELEPHONE (OUTPATIENT)
Dept: INTERNAL MEDICINE | Facility: CLINIC | Age: 72
End: 2017-05-18

## 2017-05-26 ENCOUNTER — TELEPHONE (OUTPATIENT)
Dept: INTERNAL MEDICINE | Facility: CLINIC | Age: 72
End: 2017-05-26

## 2017-05-30 ENCOUNTER — DOCUMENTATION (OUTPATIENT)
Dept: INTERNAL MEDICINE | Facility: CLINIC | Age: 72
End: 2017-05-30

## 2017-06-09 ENCOUNTER — TELEPHONE (OUTPATIENT)
Dept: INTERNAL MEDICINE | Facility: CLINIC | Age: 72
End: 2017-06-09

## 2017-06-09 NOTE — TELEPHONE ENCOUNTER
Patient called stating that she has been having all over pain since starting the losartan - she takes the losartan and amlodipine at the same time for her blood pressure.  Her blood pressure has been runnin/71, 176/86, 131/72, 140/68,, 150/74, 135/68, 149/71, 152/69, 128/70, 134/66, 130/63.  Please advise.

## 2017-06-09 NOTE — TELEPHONE ENCOUNTER
Patient notified to discontinue taking the losartan - continue amlodipine.  She is to monitor her blood pressure and see Dr. Tidwell in the office in 1 week per Dr. Tidwell.  Appointment scheduled.

## 2017-06-14 ENCOUNTER — HOSPITAL ENCOUNTER (OUTPATIENT)
Dept: CT IMAGING | Facility: HOSPITAL | Age: 72
Discharge: HOME | End: 2017-06-14
Attending: THORACIC SURGERY (CARDIOTHORACIC VASCULAR SURGERY) | Admitting: THORACIC SURGERY (CARDIOTHORACIC VASCULAR SURGERY)

## 2017-06-14 DIAGNOSIS — R91.8 LUNG MASS: ICD-10-CM

## 2017-06-14 PROCEDURE — 71250 CT THORAX DX C-: CPT

## 2017-06-16 ENCOUNTER — OFFICE VISIT (OUTPATIENT)
Dept: INTERNAL MEDICINE | Facility: CLINIC | Age: 72
End: 2017-06-16

## 2017-06-16 VITALS
OXYGEN SATURATION: 98 % | BODY MASS INDEX: 29.28 KG/M2 | HEIGHT: 61 IN | WEIGHT: 155.1 LBS | TEMPERATURE: 98.1 F | DIASTOLIC BLOOD PRESSURE: 72 MMHG | SYSTOLIC BLOOD PRESSURE: 148 MMHG | HEART RATE: 43 BPM

## 2017-06-16 DIAGNOSIS — I10 ESSENTIAL HYPERTENSION: Primary | ICD-10-CM

## 2017-06-16 PROCEDURE — 99213 OFFICE O/P EST LOW 20 MIN: CPT | Performed by: FAMILY MEDICINE

## 2017-06-16 RX ORDER — AMLODIPINE BESYLATE 5 MG/1
5 TABLET ORAL DAILY
Qty: 90 TABLET | Refills: 2 | Status: SHIPPED | OUTPATIENT
Start: 2017-06-16 | End: 2018-04-02 | Stop reason: SDUPTHER

## 2017-06-16 NOTE — PROGRESS NOTES
"Sera Mccord is a 72 y.o. female.  Seen 06/16/2017    Assessment/Plan   Problem List Items Addressed This Visit        Cardiovascular and Mediastinum    Essential hypertension - Primary    Relevant Medications    amLODIPine (NORVASC) 5 MG tablet           Continue present management hypertension with amlodipine low-fat low-cholesterol diet continue simvastatin follow-up otherwise in 6 months monitor blood pressure     Vitals:    06/16/17 1028   BP: 148/72   BP Location: Right arm   Patient Position: Sitting   Cuff Size: Adult   Pulse: (!) 43   Temp: 98.1 °F (36.7 °C)   TempSrc: Oral   SpO2: 98%   Weight: 155 lb 1.6 oz (70.4 kg)   Height: 61\" (154.9 cm)     Body mass index is 29.31 kg/(m^2).    Subjective     Chief Complaint   Patient presents with   • follow up to hypertension     Social History   Substance Use Topics   • Smoking status: Never Smoker   • Smokeless tobacco: Never Used   • Alcohol use No       History of Present Illness   Patient comes in for follow-up of hypertension ARB was giving her some muscle aches and terminal feeling she stopped a few weeks start to feel much better she's going to continue with amlodipine and monitor her blood pressure she has no chest pain shortness of breath or increased fatigue  The following portions of the patient's history were reviewed and updated as appropriate:PMHroutine: Social history , Past Medical History, Allergies, Current Medications, Active Problem List and Health Maintenance    Review of Systems   Constitutional: Negative.    HENT: Negative.    Eyes: Negative.    Respiratory: Negative.    Cardiovascular: Negative.    Gastrointestinal: Negative.    Genitourinary: Negative.    Musculoskeletal: Positive for myalgias.   Neurological: Negative.    Hematological: Negative.    Psychiatric/Behavioral: Negative.        Objective   Physical Exam   Constitutional: She is oriented to person, place, and time. She appears well-developed and well-nourished.   HENT: "   Head: Normocephalic and atraumatic.   Eyes: Conjunctivae are normal. Pupils are equal, round, and reactive to light.   Neck: Neck supple. No thyromegaly present.   Cardiovascular: Regular rhythm and normal pulses.  Bradycardia present.    Pulmonary/Chest: Effort normal and breath sounds normal. No respiratory distress. She has no wheezes.   Musculoskeletal: Normal range of motion. She exhibits no edema.   Neurological: She is alert and oriented to person, place, and time.   Skin: Skin is warm and dry.   Psychiatric: She has a normal mood and affect. Her behavior is normal. Judgment and thought content normal.   Nursing note and vitals reviewed.    Reviewed Data:  No visits with results within 1 Month(s) from this visit.  Latest known visit with results is:    Office Visit on 05/15/2017   Component Date Value Ref Range Status   • MAGGY Direct 05/15/2017 Negative  Negative Final   • Sed Rate 05/15/2017 5  0 - 30 mm/hr Final   • Total Cholesterol 05/15/2017 195  0 - 200 mg/dL Final   • Triglycerides 05/15/2017 166* 0 - 150 mg/dL Final   • HDL Cholesterol 05/15/2017 75* 40 - 60 mg/dL Final   • VLDL Cholesterol 05/15/2017 33.2  5 - 40 mg/dL Final   • LDL Cholesterol  05/15/2017 87  0 - 100 mg/dL Final   • Hep C Virus Ab 05/15/2017 <0.1  0.0 - 0.9 s/co ratio Final    Comment:                                   Negative:     < 0.8                               Indeterminate: 0.8 - 0.9                                    Positive:     > 0.9   The CDC recommends that a positive HCV antibody result   be followed up with a HCV Nucleic Acid Amplification   test ([id number]).

## 2017-06-21 ENCOUNTER — OFFICE VISIT (OUTPATIENT)
Dept: SURGERY | Facility: CLINIC | Age: 72
End: 2017-06-21

## 2017-06-21 VITALS
RESPIRATION RATE: 16 BRPM | HEART RATE: 50 BPM | WEIGHT: 155 LBS | BODY MASS INDEX: 29.27 KG/M2 | OXYGEN SATURATION: 98 % | DIASTOLIC BLOOD PRESSURE: 70 MMHG | HEIGHT: 61 IN | SYSTOLIC BLOOD PRESSURE: 140 MMHG

## 2017-06-21 DIAGNOSIS — R91.1 SOLITARY PULMONARY NODULE: Primary | ICD-10-CM

## 2017-06-21 PROCEDURE — 99213 OFFICE O/P EST LOW 20 MIN: CPT | Performed by: NURSE PRACTITIONER

## 2017-06-27 DIAGNOSIS — I48.0 PAROXYSMAL ATRIAL FIBRILLATION (HCC): ICD-10-CM

## 2017-09-07 ENCOUNTER — APPOINTMENT (OUTPATIENT)
Dept: MAMMOGRAPHY | Facility: CLINIC | Age: 72
End: 2017-09-07

## 2017-09-07 DIAGNOSIS — Z12.31 VISIT FOR SCREENING MAMMOGRAM: ICD-10-CM

## 2017-09-07 PROCEDURE — G0202 SCR MAMMO BI INCL CAD: HCPCS | Performed by: OBSTETRICS & GYNECOLOGY

## 2017-10-09 ENCOUNTER — HOSPITAL ENCOUNTER (EMERGENCY)
Facility: HOSPITAL | Age: 72
Discharge: HOME | End: 2017-10-10
Attending: EMERGENCY MEDICINE | Admitting: EMERGENCY MEDICINE

## 2017-10-09 ENCOUNTER — TELEPHONE (OUTPATIENT)
Dept: CARDIOLOGY | Facility: CLINIC | Age: 72
End: 2017-10-09

## 2017-10-09 ENCOUNTER — APPOINTMENT (OUTPATIENT)
Dept: GENERAL RADIOLOGY | Facility: HOSPITAL | Age: 72
End: 2017-10-09

## 2017-10-09 DIAGNOSIS — I48.0 PAROXYSMAL ATRIAL FIBRILLATION (HCC): Primary | ICD-10-CM

## 2017-10-09 LAB
ALBUMIN SERPL-MCNC: 4.8 G/DL (ref 3.5–5.2)
ALBUMIN/GLOB SERPL: 1.5 G/DL
ALP SERPL-CCNC: 85 U/L (ref 39–117)
ALT SERPL W P-5'-P-CCNC: 19 U/L (ref 1–33)
ANION GAP SERPL CALCULATED.3IONS-SCNC: 16.3 MMOL/L
AST SERPL-CCNC: 21 U/L (ref 1–32)
BASOPHILS # BLD AUTO: 0.01 10*3/MM3 (ref 0–0.2)
BASOPHILS NFR BLD AUTO: 0.1 % (ref 0–1.5)
BILIRUB SERPL-MCNC: 0.4 MG/DL (ref 0.1–1.2)
BUN BLD-MCNC: 12 MG/DL (ref 8–23)
BUN/CREAT SERPL: 14 (ref 7–25)
CALCIUM SPEC-SCNC: 10.3 MG/DL (ref 8.6–10.5)
CHLORIDE SERPL-SCNC: 104 MMOL/L (ref 98–107)
CO2 SERPL-SCNC: 26.7 MMOL/L (ref 22–29)
CREAT BLD-MCNC: 0.86 MG/DL (ref 0.57–1)
DEPRECATED RDW RBC AUTO: 47.4 FL (ref 37–54)
EOSINOPHIL # BLD AUTO: 0.2 10*3/MM3 (ref 0–0.7)
EOSINOPHIL NFR BLD AUTO: 2.9 % (ref 0.3–6.2)
ERYTHROCYTE [DISTWIDTH] IN BLOOD BY AUTOMATED COUNT: 14 % (ref 11.7–13)
GFR SERPL CREATININE-BSD FRML MDRD: 65 ML/MIN/1.73
GLOBULIN UR ELPH-MCNC: 3.3 GM/DL
GLUCOSE BLD-MCNC: 139 MG/DL (ref 65–99)
HCT VFR BLD AUTO: 46.5 % (ref 35.6–45.5)
HGB BLD-MCNC: 15.6 G/DL (ref 11.9–15.5)
IMM GRANULOCYTES # BLD: 0 10*3/MM3 (ref 0–0.03)
IMM GRANULOCYTES NFR BLD: 0 % (ref 0–0.5)
LYMPHOCYTES # BLD AUTO: 2.84 10*3/MM3 (ref 0.9–4.8)
LYMPHOCYTES NFR BLD AUTO: 41.6 % (ref 19.6–45.3)
MCH RBC QN AUTO: 31 PG (ref 26.9–32)
MCHC RBC AUTO-ENTMCNC: 33.5 G/DL (ref 32.4–36.3)
MCV RBC AUTO: 92.3 FL (ref 80.5–98.2)
MONOCYTES # BLD AUTO: 0.44 10*3/MM3 (ref 0.2–1.2)
MONOCYTES NFR BLD AUTO: 6.4 % (ref 5–12)
NEUTROPHILS # BLD AUTO: 3.34 10*3/MM3 (ref 1.9–8.1)
NEUTROPHILS NFR BLD AUTO: 49 % (ref 42.7–76)
PLATELET # BLD AUTO: 255 10*3/MM3 (ref 140–500)
PMV BLD AUTO: 11.4 FL (ref 6–12)
POTASSIUM BLD-SCNC: 3.4 MMOL/L (ref 3.5–5.2)
PROT SERPL-MCNC: 8.1 G/DL (ref 6–8.5)
RBC # BLD AUTO: 5.04 10*6/MM3 (ref 3.9–5.2)
SODIUM BLD-SCNC: 147 MMOL/L (ref 136–145)
WBC NRBC COR # BLD: 6.83 10*3/MM3 (ref 4.5–10.7)

## 2017-10-09 PROCEDURE — 80053 COMPREHEN METABOLIC PANEL: CPT | Performed by: PHYSICIAN ASSISTANT

## 2017-10-09 PROCEDURE — 99284 EMERGENCY DEPT VISIT MOD MDM: CPT

## 2017-10-09 PROCEDURE — 93005 ELECTROCARDIOGRAM TRACING: CPT

## 2017-10-09 PROCEDURE — 93010 ELECTROCARDIOGRAM REPORT: CPT | Performed by: INTERNAL MEDICINE

## 2017-10-09 PROCEDURE — 85025 COMPLETE CBC W/AUTO DIFF WBC: CPT | Performed by: PHYSICIAN ASSISTANT

## 2017-10-09 PROCEDURE — 84484 ASSAY OF TROPONIN QUANT: CPT | Performed by: PHYSICIAN ASSISTANT

## 2017-10-09 PROCEDURE — 96374 THER/PROPH/DIAG INJ IV PUSH: CPT

## 2017-10-09 PROCEDURE — 71010 HC CHEST PA OR AP: CPT

## 2017-10-09 RX ORDER — SODIUM CHLORIDE 0.9 % (FLUSH) 0.9 %
10 SYRINGE (ML) INJECTION AS NEEDED
Status: DISCONTINUED | OUTPATIENT
Start: 2017-10-09 | End: 2017-10-10 | Stop reason: HOSPADM

## 2017-10-09 RX ORDER — UBIDECARENONE 100 MG
200 CAPSULE ORAL DAILY
COMMUNITY
End: 2018-01-22

## 2017-10-09 RX ADMIN — METOROPROLOL TARTRATE 5 MG: 5 INJECTION, SOLUTION INTRAVENOUS at 22:54

## 2017-10-10 VITALS
OXYGEN SATURATION: 98 % | TEMPERATURE: 98.5 F | WEIGHT: 155 LBS | SYSTOLIC BLOOD PRESSURE: 116 MMHG | BODY MASS INDEX: 29.27 KG/M2 | HEART RATE: 55 BPM | DIASTOLIC BLOOD PRESSURE: 76 MMHG | HEIGHT: 61 IN | RESPIRATION RATE: 18 BRPM

## 2017-10-10 LAB — TROPONIN T SERPL-MCNC: <0.01 NG/ML (ref 0–0.03)

## 2017-10-10 NOTE — ED PROVIDER NOTES
Pt c/o a rapid heart rate which began around 2000 this evening. Pt has a history of occasional a-fib. Pt normally takes xarelto, but she did not take her dose today. Pt also c/o headache, generalized weakness, and chest discomfort. Since being in the ED, the pt's heart rate has returned to NSR with a heart rate in the 50s.     PEx  Awake alert oriented x3  No distress  Lungs clear to auscultation   Abdomen: benign  Heart: RRR  HENT: unremarkable       EKG           EKG time: 2144  Rhythm/Rate: a fib rate 125  QRS, axis: LAD   ST and T waves: ST depression lead 2 aVF, V4-V6     Interpreted Contemporaneously by me, independently viewed  changed compared to prior 2/17 a-fib new, ST depression new, but likely rate related    Agree with plan to discharge pt, with plan to f/o with cardiology.     I supervised care provided by the midlevel provider.    We have discussed this patient's history, physical exam, and treatment plan.   I have reviewed the note and personally saw and examined the patient and agree with the plan of care.    Documentation assistance provided by lars Aaron for Dr. Gibbs.  Information recorded by the scribe was done at my direction and has been verified and validated by me.       Tracey Aaron  10/09/17 [id number]       Ventura Gibbs MD  10/10/17 2502

## 2017-10-10 NOTE — ED PROVIDER NOTES
EMERGENCY DEPARTMENT ENCOUNTER    CHIEF COMPLAINT  Chief Complaint: tachycardia and headache  History given by: patient  History limited by: none  Room Number: 19/19  PMD: Parmjit Tidwell MD      HPI:  Pt is a 72 y.o. female with a h/o A-fib who presents with a headache and tachycardia with palpitations onset earlier tonight around 2000. At symptom onset, patient checked her blood pressure and reports it was elevated (160's). She denies having chest pain, SOA, nausea, vomiting or other symptoms at this time; however she does have increased urinary frequency, generalized weakness, chills and left-sided neck pain.     She is on xerelto.    Duration:  About 2 hours  Onset: gradual  Timing: constant  Location: none stated  Radiation: none stated  Quality: headache and tachycardia, unspecified quality  Intensity/Severity: moderate  Progression: resolved headache, unchanged tachycardia with palpitations  Associated Symptoms: hypertension, increased urinary frequency, generalized weakness, chills, left-sided neck pain  Aggravating Factors: none stated  Alleviating Factors: none stated  Previous Episodes: patient has a h/o A-fib  Treatment before arrival: none stated    PAST MEDICAL HISTORY  Active Ambulatory Problems     Diagnosis Date Noted   • Arteriosclerosis of coronary artery 03/04/2016   • Arthritis 06/14/2016   • Foot pain 06/14/2016   • Histoplasmosis 06/14/2016   • Mixed hyperlipidemia 06/14/2016   • Cannot sleep 06/14/2016   • Lung mass 06/14/2016   • LAD (lymphadenopathy), mediastinal 06/14/2016   • Plantar fasciitis 06/14/2016   • Restless leg 06/14/2016   • Fibromyalgia 12/13/2016   • Hyperglycemia 12/13/2016   • Allergic rhinitis due to allergen 01/10/2017   • Anxiety 02/13/2017   • Bradycardia 02/24/2017   • Essential hypertension 03/24/2017   • Medicare annual wellness visit, subsequent 05/15/2017     Resolved Ambulatory Problems     Diagnosis Date Noted   • No Resolved Ambulatory Problems     Past  Medical History:   Diagnosis Date   • Abnormal finding on lung imaging    • Arthritis    • Coronary artery disease    • Fibromyalgia    • Foot pain    • Hemorrhoids    • Histoplasmosis    • Hyperlipemia    • Hyperlipidemia    • Hypertension    • IBS (irritable bowel syndrome)    • Insomnia    • Lung mass    • Mediastinal lymphadenopathy    • Night sweat    • Plantar fasciitis    • Restless leg syndrome    • Sore throat        PAST SURGICAL HISTORY  Past Surgical History:   Procedure Laterality Date   • CARDIAC CATHETERIZATION  2015   • COLONOSCOPY N/A 10/24/2016    Procedure: COLONOSCOPY;  Surgeon: Lauren Reich MD;  Location: [facility] ENDOSCOPY;  Service:    • COLONOSCOPY W/ POLYPECTOMY  2013    Dr. Lauren Reich   • HYSTERECTOMY     • UPPER GASTROINTESTINAL ENDOSCOPY  11/2015    Dr. Lauren Reich       FAMILY HISTORY  Family History   Problem Relation Age of Onset   • Hypertension Mother    • Heart disease Mother    • Heart disease Father    • Heart attack Father    • Skin cancer Maternal Grandmother    • No Known Problems Maternal Grandfather    • Arthritis Paternal Grandmother    • Heart disease Paternal Grandfather    • Ovarian cancer Maternal Aunt        SOCIAL HISTORY  Social History     Social History   • Marital status:      Spouse name: N/A   • Number of children: N/A   • Years of education: N/A     Occupational History   • Not on file.     Social History Main Topics   • Smoking status: Never Smoker   • Smokeless tobacco: Never Used   • Alcohol use No   • Drug use: No   • Sexual activity: Defer     Other Topics Concern   • Not on file     Social History Narrative   • No narrative on file       ALLERGIES  Duloxetine; Lisinopril; Losartan; Mobic [meloxicam]; Codeine; Levofloxacin; and Sulfa antibiotics    REVIEW OF SYSTEMS  Review of Systems   Constitutional: Positive for chills. Negative for fever.   HENT: Negative for sore throat.    Eyes: Negative.    Respiratory: Negative for cough and shortness of  breath.    Cardiovascular: Positive for palpitations (with tachycardia). Negative for chest pain.        Positive hypertension   Gastrointestinal: Negative for abdominal pain, diarrhea, nausea and vomiting.   Genitourinary: Positive for frequency (increased). Negative for dysuria.   Musculoskeletal: Positive for neck pain (left-sided).   Skin: Negative for rash.   Allergic/Immunologic: Negative.    Neurological: Positive for weakness (generalized). Negative for numbness and headaches.   Hematological: Negative.    Psychiatric/Behavioral: Negative.    All other systems reviewed and are negative.      PHYSICAL EXAM  ED Triage Vitals   Temp Heart Rate Resp BP SpO2   10/09/17 2133 10/09/17 2133 10/09/17 2151 10/09/17 2151 10/09/17 2133   98.4 °F (36.9 °C) 79 22 178/102 98 %      Temp src Heart Rate Source Patient Position BP Location FiO2 (%)   10/09/17 2133 10/09/17 2133 10/09/17 2151 10/09/17 2151 --   Tympanic Monitor Lying Left arm        Physical Exam   Constitutional: She is oriented to person, place, and time and well-developed, well-nourished, and in no distress. No distress.   HENT:   Head: Normocephalic and atraumatic.   Eyes: EOM are normal. Pupils are equal, round, and reactive to light.   Neck: Normal range of motion. Neck supple.   There is no neck pain to palpation   Cardiovascular: Normal heart sounds and intact distal pulses.  An irregular rhythm present. Tachycardia present.    Pulmonary/Chest: Effort normal and breath sounds normal. No respiratory distress.   Abdominal: Soft. There is no tenderness. There is no rebound and no guarding.   Musculoskeletal: Normal range of motion. She exhibits no edema.   Neurological: She is alert and oriented to person, place, and time. She has normal sensation and normal strength.   Skin: Skin is warm and dry. No rash noted.   Psychiatric: Mood and affect normal.   Nursing note and vitals reviewed.      LAB RESULTS  Lab Results (last 24 hours)     Procedure Component  Value Units Date/Time    CBC & Differential [[id number]] Collected:  10/09/17 2251    Specimen:  Blood Updated:  10/09/17 2310    Narrative:       The following orders were created for panel order CBC & Differential.  Procedure                               Abnormality         Status                     ---------                               -----------         ------                     CBC Auto Differential[[id number]]        Abnormal            Final result                 Please view results for these tests on the individual orders.    Comprehensive Metabolic Panel [[id number]]  (Abnormal) Collected:  10/09/17 2251    Specimen:  Blood Updated:  10/09/17 2359     Glucose 139 (H) mg/dL      BUN 12 mg/dL      Creatinine 0.86 mg/dL      Sodium 147 (H) mmol/L      Potassium 3.4 (L) mmol/L      Chloride 104 mmol/L      CO2 26.7 mmol/L      Calcium 10.3 mg/dL      Total Protein 8.1 g/dL      Albumin 4.80 g/dL      ALT (SGPT) 19 U/L      AST (SGOT) 21 U/L      Alkaline Phosphatase 85 U/L      Total Bilirubin 0.4 mg/dL      eGFR Non African Amer 65 mL/min/1.73      Globulin 3.3 gm/dL      A/G Ratio 1.5 g/dL      BUN/Creatinine Ratio 14.0     Anion Gap 16.3 mmol/L     Troponin [[id number]]  (Normal) Collected:  10/09/17 2251    Specimen:  Blood Updated:  10/10/17 0008     Troponin T <0.010 ng/mL     Narrative:       Troponin T Reference Ranges:  Less than 0.03 ng/mL:    Negative for AMI  0.03 to 0.09 ng/mL:      Indeterminant for AMI  Greater than 0.09 ng/mL: Positive for AMI    CBC Auto Differential [[id number]]  (Abnormal) Collected:  10/09/17 2251    Specimen:  Blood Updated:  10/09/17 2310     WBC 6.83 10*3/mm3      RBC 5.04 10*6/mm3      Hemoglobin 15.6 (H) g/dL      Hematocrit 46.5 (H) %      MCV 92.3 fL      MCH 31.0 pg      MCHC 33.5 g/dL      RDW 14.0 (H) %      RDW-SD 47.4 fl      MPV 11.4 fL      Platelets 255 10*3/mm3      Neutrophil % 49.0 %      Lymphocyte % 41.6 %      Monocyte % 6.4 %      Eosinophil % 2.9 %       Basophil % 0.1 %      Immature Grans % 0.0 %      Neutrophils, Absolute 3.34 10*3/mm3      Lymphocytes, Absolute 2.84 10*3/mm3      Monocytes, Absolute 0.44 10*3/mm3      Eosinophils, Absolute 0.20 10*3/mm3      Basophils, Absolute 0.01 10*3/mm3      Immature Grans, Absolute 0.00 10*3/mm3           I ordered the above labs and reviewed the results    RADIOLOGY  XR Chest 1 View   Preliminary Result   No acute findings. No significant change.               I ordered the above noted radiological studies. Interpreted by radiologist. Reviewed by me in PACS.       PROCEDURES  Procedures  EKG- See Dr. Gibbs's Note    PROGRESS AND CONSULTS  ED Course   2136: Ordered ECG per triage protocol.    2235: Ordered CBC, CMP, troponin and CXR per protocol. Ordered metoprolol for hypertension and heart rate regulation.     2310: Rechecked patient whose heart rate is in the 100s.She is in NAD.     2332: Rechecked patient who has converted from her A-fib. Once Dr. Gibbs evaluates, patient will be ready for d/c. Pt and family understand and agree with the plan. All questions answered.    0002 :  Heart rate 60s, NSR.  Normal labs, will discharge.  Pt feels better.     MEDICAL DECISION MAKING  Results were reviewed/discussed with the patient and they were also made aware of online access. Pt also made aware that some labs, such as cultures, will not be resulted during ER visit and follow up with PMD is necessary.     MDM  Number of Diagnoses or Management Options  Paroxysmal atrial fibrillation:      Amount and/or Complexity of Data Reviewed  Clinical lab tests: ordered and reviewed (Troponin <0.010)  Tests in the radiology section of CPT®: ordered and reviewed (CXR is not acute)  Tests in the medicine section of CPT®: ordered and reviewed (EKG - See Dr. Gibbs's note)  Discussion of test results with the performing providers: yes (Dr. Gibbs)  Decide to obtain previous medical records or to obtain history from someone other than the  patient: yes  Review and summarize past medical records: yes (Patient has a h/o A-fib)  Independent visualization of images, tracings, or specimens: yes    Patient Progress  Patient progress: stable         DIAGNOSIS  Final diagnoses:   Paroxysmal atrial fibrillation       DISPOSITION  DISCHARGE    Patient discharged in stable condition.    Reviewed implications of results, diagnosis, meds, responsibility to follow up, warning signs and symptoms of possible worsening, potential complications and reasons to return to ER, including new or worsening sx.    Patient/Family voiced understanding of above instructions.    Discussed plan for discharge, as there is no emergent indication for admission.  Pt/family is agreeable and understands need for follow up and repeat testing.  Pt is aware that discharge does not mean that nothing is wrong but it indicates no emergency is present that requires admission and they must continue care with follow-up as given below or physician of their choice.     FOLLOW-UP  Mango Linda MD  [address] [address]  [phone]    Schedule an appointment as soon as possible for a visit           Medication List      Notice     No changes were made to your prescriptions during this visit.            Latest Documented Vital Signs:  As of 12:15 AM  BP- 117/62 HR- 52 Temp- 98.5 °F (36.9 °C) (Oral) O2 sat- 97%    --  Documentation assistance provided by lars Maier for Ricco Zamarripa PA-C.  Information recorded by the scribe was done at my direction and has been verified and validated by me.       Cherrie Maier  10/09/17 [id number]       KATHIA Mcarthur  10/10/17 0017

## 2017-10-12 ENCOUNTER — TELEPHONE (OUTPATIENT)
Dept: SOCIAL WORK | Facility: HOSPITAL | Age: 72
End: 2017-10-12

## 2017-10-12 NOTE — TELEPHONE ENCOUNTER
"ER F/U phone call:   Pt states that she is doing better. States \"heart is normal now\". She is to see cardiologist on 10-27-17. No other questions or concerns voiced at this time. Estephanie Jacobsen RN    "

## 2017-10-27 ENCOUNTER — OFFICE VISIT (OUTPATIENT)
Dept: CARDIOLOGY | Facility: CLINIC | Age: 72
End: 2017-10-27

## 2017-10-27 VITALS
HEART RATE: 47 BPM | HEIGHT: 61 IN | DIASTOLIC BLOOD PRESSURE: 78 MMHG | SYSTOLIC BLOOD PRESSURE: 148 MMHG | BODY MASS INDEX: 30.21 KG/M2 | WEIGHT: 160 LBS

## 2017-10-27 DIAGNOSIS — I48.0 PAROXYSMAL ATRIAL FIBRILLATION (HCC): ICD-10-CM

## 2017-10-27 DIAGNOSIS — R00.1 BRADYCARDIA: Primary | ICD-10-CM

## 2017-10-27 PROCEDURE — 99214 OFFICE O/P EST MOD 30 MIN: CPT | Performed by: INTERNAL MEDICINE

## 2017-10-27 PROCEDURE — 93000 ELECTROCARDIOGRAM COMPLETE: CPT | Performed by: INTERNAL MEDICINE

## 2017-10-27 RX ORDER — FLECAINIDE ACETATE 50 MG/1
25 TABLET ORAL 2 TIMES DAILY
Qty: 90 TABLET | Refills: 3 | Status: SHIPPED | OUTPATIENT
Start: 2017-10-27 | End: 2018-03-06 | Stop reason: SDUPTHER

## 2017-10-27 NOTE — PROGRESS NOTES
"Date of Office Visit: 10/27/2017  Encounter Provider: Mango Linda MD  Place of Service: [facility] CARDIOLOGY  Patient Name: Sera Mccord  :1945  [id number]    Chief Complaint   Patient presents with   • Atrial Fibrillation   • Hypertension   • Coronary Artery Disease   :     HPI: Sera Mccord is a 72 y.o. female  She is a lady that had a stress test because she was having some chest pain.  The stress test was abnormal so we ended up performing a cardiac catheterization on her which revealed normal coronaries.  We labelled the patient as a \"syndrome X\" and she has been fine from that standpoint.  She has had a couple of episodes of paroxysmal atrial fibrillation and has been anticoagulated with Xarelto; mainly because she is 71, is female and has a CHADS2-VASc score of 2.  Her echocardiogram was normal.      She just recently had another episode of atrial fibrillation.  She was in the emergency room and converted.  Now she is here for followup.  She has been feeling fine.  She has not changed anything.  Occasionally she will have some caffeine.  She does not drink alcohol and she does not snore.  Her weight is good.  Her activity level is decent.  She has not had any bleeding problems.  In the past she had been tried on beta blockers and was intolerant of it.  She has a resting bradycardia in the 40s.          Past Medical History:   Diagnosis Date   • Abnormal finding on lung imaging    • Arthritis     DJD Multiple joints spine, shoulders, knees, left hip. 2013 normal joint examination. Patient has 6 positive fibromyalgia tender points.   • Coronary artery disease      markedly abnl stress and nl cath; poss syndrome x  felt terrible with metoprolol   • Fibromyalgia    • Foot pain    • Hemorrhoids     internal and external   • Histoplasmosis    • Hyperlipidemia    • Hypertension    • IBS (irritable bowel syndrome)    • Insomnia     affected by burning in " legs and also pain in right ear and occipit   • Lung mass    • Mediastinal lymphadenopathy    • Night sweat    • PAF (paroxysmal atrial fibrillation)    • Plantar fasciitis     12/13 right side   • Restless leg syndrome    • Sore throat        Past Surgical History:   Procedure Laterality Date   • CARDIAC CATHETERIZATION  2015   • COLONOSCOPY N/A 10/24/2016    Procedure: COLONOSCOPY;  Surgeon: Lauren Reich MD;  Location: [facility] ENDOSCOPY;  Service:    • COLONOSCOPY W/ POLYPECTOMY  2013    Dr. Lauren Reich   • HYSTERECTOMY     • UPPER GASTROINTESTINAL ENDOSCOPY  11/2015    Dr. Lauren Reich       Social History     Social History   • Marital status:      Spouse name: N/A   • Number of children: N/A   • Years of education: N/A     Occupational History   • Not on file.     Social History Main Topics   • Smoking status: Never Smoker   • Smokeless tobacco: Never Used   • Alcohol use No   • Drug use: No   • Sexual activity: Defer     Other Topics Concern   • Not on file     Social History Narrative       Family History   Problem Relation Age of Onset   • Hypertension Mother    • Heart disease Mother    • Heart disease Father    • Heart attack Father    • Skin cancer Maternal Grandmother    • No Known Problems Maternal Grandfather    • Arthritis Paternal Grandmother    • Heart disease Paternal Grandfather    • Ovarian cancer Maternal Aunt        Review of Systems   Constitution: Negative for decreased appetite, fever, malaise/fatigue and weight loss.   HENT: Negative for nosebleeds.    Eyes: Negative for double vision.   Cardiovascular: Negative for chest pain, claudication, cyanosis, dyspnea on exertion, irregular heartbeat, leg swelling, near-syncope, orthopnea, palpitations, paroxysmal nocturnal dyspnea and syncope.   Respiratory: Negative for cough, hemoptysis and shortness of breath.    Hematologic/Lymphatic: Negative for bleeding problem.   Skin: Negative for rash.   Musculoskeletal: Negative for falls and  "myalgias.   Gastrointestinal: Negative for hematochezia, jaundice, melena, nausea and vomiting.   Genitourinary: Negative for hematuria.   Neurological: Negative for dizziness and seizures.   Psychiatric/Behavioral: Negative for altered mental status and memory loss.       Allergies   Allergen Reactions   • Duloxetine      Leg swelling   • Lisinopril      Leg swelling   • Losartan Myalgia   • Mobic [Meloxicam] Other (See Comments) and GI Intolerance     increased heart rate and elevated blood pressure   • Codeine Nausea And Vomiting   • Levofloxacin Swelling   • Sulfa Antibiotics Nausea And Vomiting         Current Outpatient Prescriptions:   •  amLODIPine (NORVASC) 5 MG tablet, Take 1 tablet by mouth Daily., Disp: 90 tablet, Rfl: 2  •  cholecalciferol (VITAMIN D3) 1000 UNITS tablet, Take 2,000 Units by mouth Daily., Disp: , Rfl:   •  coenzyme Q10 100 MG capsule, Take 200 mg by mouth Daily., Disp: , Rfl:   •  rivaroxaban (XARELTO) 20 MG tablet, Take 1 tablet by mouth Daily With Dinner., Disp: 70 tablet, Rfl: 0  •  simvastatin (ZOCOR) 20 MG tablet, Take 1 tablet by mouth Every Night., Disp: 90 tablet, Rfl: 3  •  flecainide (TAMBOCOR) 50 MG tablet, Take 0.5 tablets by mouth 2 (Two) Times a Day., Disp: 90 tablet, Rfl: 3     Objective:     Vitals:    10/27/17 1139   BP: 148/78   Pulse: (!) 47   Weight: 160 lb (72.6 kg)   Height: 61\" (154.9 cm)     Body mass index is 30.23 kg/(m^2).    Physical Exam   Constitutional: She is oriented to person, place, and time. She appears well-developed and well-nourished.   HENT:   Head: Normocephalic.   Eyes: No scleral icterus.   Neck: No JVD present. No thyromegaly present.   Cardiovascular: Normal rate, regular rhythm and normal heart sounds.  Exam reveals no gallop and no friction rub.    No murmur heard.  Pulmonary/Chest: Effort normal and breath sounds normal. She has no wheezes. She has no rales.   Abdominal: Soft. There is no hepatosplenomegaly. There is no tenderness. "   Musculoskeletal: Normal range of motion. She exhibits no edema.   Lymphadenopathy:     She has no cervical adenopathy.   Neurological: She is alert and oriented to person, place, and time.   Skin: Skin is warm and dry. No rash noted.   Psychiatric: She has a normal mood and affect.         ECG 12 Lead  Date/Time: 10/27/2017 12:18 PM  Performed by: MULUGETA LINDA  Authorized by: MULUGETA LINDA   Comparison: compared with previous ECG   Similar to previous ECG  Rhythm: sinus bradycardia  Clinical impression: normal ECG             Assessment:       Diagnosis Plan   1. Bradycardia  ECG 12 Lead   2. Paroxysmal atrial fibrillation  ECG 12 Lead          Plan:       She continues to have paroxysms of A. fib I wouldn't recommend that we put her on flecainide 25 mg twice a day however Coumadin EKG in 2 days and see us she tolerates it and have her come and see Damaris in 3 months and see me in a year    As always, it has been a pleasure to participate in your patient's care.      Sincerely,       Mulugeta Linda MD

## 2017-11-07 ENCOUNTER — CLINICAL SUPPORT (OUTPATIENT)
Dept: CARDIOLOGY | Facility: CLINIC | Age: 72
End: 2017-11-07

## 2017-11-07 DIAGNOSIS — I48.0 PAROXYSMAL ATRIAL FIBRILLATION (HCC): ICD-10-CM

## 2017-11-07 DIAGNOSIS — R00.1 BRADYCARDIA: ICD-10-CM

## 2017-11-07 PROCEDURE — 93000 ELECTROCARDIOGRAM COMPLETE: CPT | Performed by: INTERNAL MEDICINE

## 2017-11-07 NOTE — PROGRESS NOTES
Procedure     ECG 12 Lead  Date/Time: 11/7/2017 10:33 AM  Performed by: MULUGETA LINDA  Authorized by: MULUGETA LINDA   Comparison: compared with previous ECG   Similar to previous ECG  Rhythm: sinus rhythm  Clinical impression: abnormal ECG  Comments: ns ST-T wave abnormality            Patient presents today for an EKG only. Medication is update. She was started on Flecainide acetate 25 MG BID following his visit with Dr. Linda on 10/27/17. Her heart rate today was 44.  Present the ekg to . He didn't make any changes. Patient denies any SOA, CP, Palp, [place], edema. She did say she did feel a little bit more fatigue then normal.

## 2018-01-08 ENCOUNTER — TELEPHONE (OUTPATIENT)
Dept: CARDIOLOGY | Facility: CLINIC | Age: 73
End: 2018-01-08

## 2018-01-08 NOTE — TELEPHONE ENCOUNTER
The patient called and states that she was seen at an urgent care center yesterday and that she was given amoxacillin and coricidin. The pharmacist advised the patient to call and discuss the coricidin with you. She can be reached at [phone]      Thanks   Petar

## 2018-01-22 ENCOUNTER — OFFICE VISIT (OUTPATIENT)
Dept: INTERNAL MEDICINE | Facility: CLINIC | Age: 73
End: 2018-01-22

## 2018-01-22 VITALS
TEMPERATURE: 98 F | SYSTOLIC BLOOD PRESSURE: 124 MMHG | HEIGHT: 60 IN | HEART RATE: 58 BPM | OXYGEN SATURATION: 98 % | BODY MASS INDEX: 32.02 KG/M2 | WEIGHT: 163.1 LBS | DIASTOLIC BLOOD PRESSURE: 66 MMHG

## 2018-01-22 DIAGNOSIS — J32.9 SINUSITIS, UNSPECIFIED CHRONICITY, UNSPECIFIED LOCATION: ICD-10-CM

## 2018-01-22 DIAGNOSIS — E78.2 MIXED HYPERLIPIDEMIA: ICD-10-CM

## 2018-01-22 DIAGNOSIS — E87.6 HYPOKALEMIA: ICD-10-CM

## 2018-01-22 DIAGNOSIS — I10 ESSENTIAL HYPERTENSION: Primary | ICD-10-CM

## 2018-01-22 LAB
BUN SERPL-MCNC: 14 MG/DL (ref 8–23)
BUN/CREAT SERPL: 17.7 (ref 7–25)
CALCIUM SERPL-MCNC: 9.7 MG/DL (ref 8.6–10.5)
CHLORIDE SERPL-SCNC: 103 MMOL/L (ref 98–107)
CO2 SERPL-SCNC: 27.5 MMOL/L (ref 22–29)
CREAT SERPL-MCNC: 0.79 MG/DL (ref 0.57–1)
GLUCOSE SERPL-MCNC: 86 MG/DL (ref 65–99)
POTASSIUM SERPL-SCNC: 4.9 MMOL/L (ref 3.5–5.2)
SODIUM SERPL-SCNC: 145 MMOL/L (ref 136–145)

## 2018-01-22 PROCEDURE — 99214 OFFICE O/P EST MOD 30 MIN: CPT | Performed by: FAMILY MEDICINE

## 2018-01-22 NOTE — PROGRESS NOTES
Sera Mccord is a 72 y.o. female.      Assessment/Plan   Problem List Items Addressed This Visit        Cardiovascular and Mediastinum    Mixed hyperlipidemia    Overview     Description: Most recent blood work August 2013         Essential hypertension - Primary    Relevant Orders    Basic Metabolic Panel (Completed)       Respiratory    Sinusitis       Other    Hypokalemia    Relevant Orders    Basic Metabolic Panel (Completed)         follow up results of blood work  Patient will trial some Flonase to help with her head congestion is much improved from her previous sinus infection for follow-up results of BMP with previous hyperkalemia she's otherwise monitor blood pressure.      Chief Complaint   Patient presents with   • [facility] follow up to sinusitis   • follow up to hypertension   • follow up to hyperlipidemia     Social History   Substance Use Topics   • Smoking status: Never Smoker   • Smokeless tobacco: Never Used   • Alcohol use No       History of Present Illness   Patient comes in for chronic medical problems hypertension hyperlipidemia in follow-up regarding recent sinus infection and symptoms seem to be improving is no chest pain or shortness of breath she's trying to watch her diet for lipid management as well as on therapy  The following portions of the patient's history were reviewed and updated as appropriate:PMHroutine: Social history , Past Medical History, Allergies, Current Medications, Active Problem List, Family History and Health Maintenance    Review of Systems   Constitutional: Negative.    HENT: Negative.    Eyes: Negative.    Respiratory: Negative.    Cardiovascular: Negative.    Gastrointestinal: Negative.    Genitourinary: Negative.    Musculoskeletal: Negative for myalgias.   Neurological: Negative.    Hematological: Negative.    Psychiatric/Behavioral: Negative.        Objective   Vitals:    01/22/18 0934   BP: 124/66   BP Location: Right arm   Patient  "Position: Sitting   Cuff Size: Adult   Pulse: 58   Temp: 98 °F (36.7 °C)   TempSrc: Oral   SpO2: 98%   Weight: 74 kg (163 lb 1.6 oz)   Height: 152.4 cm (60\")     Body mass index is 31.85 kg/(m^2).  Physical Exam   Constitutional: She is oriented to person, place, and time. She appears well-developed and well-nourished.   HENT:   Head: Normocephalic and atraumatic.   Eyes: Conjunctivae are normal. Pupils are equal, round, and reactive to light.   Neck: Neck supple. No thyromegaly present.   Cardiovascular: Regular rhythm and normal pulses.  Bradycardia present.    Pulmonary/Chest: Effort normal and breath sounds normal. No respiratory distress. She has no wheezes.   Musculoskeletal: Normal range of motion. She exhibits no edema.   Neurological: She is alert and oriented to person, place, and time.   Skin: Skin is warm and dry.   Psychiatric: She has a normal mood and affect. Her behavior is normal. Judgment and thought content normal.   Nursing note and vitals reviewed.    Reviewed Data:  Office Visit on 01/22/2018   Component Date Value Ref Range Status   • Glucose 01/22/2018 86  65 - 99 mg/dL Final   • BUN 01/22/2018 14  8 - 23 mg/dL Final   • Creatinine 01/22/2018 0.79  0.57 - 1.00 mg/dL Final   • eGFR Non  Am 01/22/2018 72  >60 mL/min/1.73 Final   • eGFR African Am 01/22/2018 87  >60 mL/min/1.73 Final   • BUN/Creatinine Ratio 01/22/2018 17.7  7.0 - 25.0 Final   • Sodium 01/22/2018 145  136 - 145 mmol/L Final   • Potassium 01/22/2018 4.9  3.5 - 5.2 mmol/L Final   • Chloride 01/22/2018 103  98 - 107 mmol/L Final   • Total CO2 01/22/2018 27.5  22.0 - 29.0 mmol/L Final   • Calcium 01/22/2018 9.7  8.6 - 10.5 mg/dL Final         "

## 2018-01-24 ENCOUNTER — TELEPHONE (OUTPATIENT)
Dept: INTERNAL MEDICINE | Facility: CLINIC | Age: 73
End: 2018-01-24

## 2018-02-07 ENCOUNTER — HOSPITAL ENCOUNTER (EMERGENCY)
Facility: HOSPITAL | Age: 73
Discharge: HOME | End: 2018-02-07
Attending: EMERGENCY MEDICINE | Admitting: EMERGENCY MEDICINE

## 2018-02-07 VITALS
HEIGHT: 61 IN | DIASTOLIC BLOOD PRESSURE: 69 MMHG | SYSTOLIC BLOOD PRESSURE: 125 MMHG | BODY MASS INDEX: 30.21 KG/M2 | HEART RATE: 71 BPM | OXYGEN SATURATION: 96 % | RESPIRATION RATE: 18 BRPM | WEIGHT: 160 LBS | TEMPERATURE: 97.9 F

## 2018-02-07 DIAGNOSIS — I48.0 PAROXYSMAL ATRIAL FIBRILLATION (HCC): Primary | ICD-10-CM

## 2018-02-07 LAB
ALBUMIN SERPL-MCNC: 4.6 G/DL (ref 3.5–5.2)
ALBUMIN/GLOB SERPL: 1.3 G/DL
ALP SERPL-CCNC: 96 U/L (ref 39–117)
ALT SERPL W P-5'-P-CCNC: 21 U/L (ref 1–33)
ANION GAP SERPL CALCULATED.3IONS-SCNC: 14.7 MMOL/L
APTT PPP: 36.4 SECONDS (ref 22.7–35.4)
AST SERPL-CCNC: 21 U/L (ref 1–32)
BASOPHILS # BLD AUTO: 0.02 10*3/MM3 (ref 0–0.2)
BASOPHILS NFR BLD AUTO: 0.3 % (ref 0–1.5)
BILIRUB SERPL-MCNC: 0.3 MG/DL (ref 0.1–1.2)
BUN BLD-MCNC: 20 MG/DL (ref 8–23)
BUN/CREAT SERPL: 26 (ref 7–25)
CALCIUM SPEC-SCNC: 9.9 MG/DL (ref 8.6–10.5)
CHLORIDE SERPL-SCNC: 104 MMOL/L (ref 98–107)
CO2 SERPL-SCNC: 26.3 MMOL/L (ref 22–29)
CREAT BLD-MCNC: 0.77 MG/DL (ref 0.57–1)
DEPRECATED RDW RBC AUTO: 48 FL (ref 37–54)
EOSINOPHIL # BLD AUTO: 0.25 10*3/MM3 (ref 0–0.7)
EOSINOPHIL NFR BLD AUTO: 3.6 % (ref 0.3–6.2)
ERYTHROCYTE [DISTWIDTH] IN BLOOD BY AUTOMATED COUNT: 14.2 % (ref 11.7–13)
GFR SERPL CREATININE-BSD FRML MDRD: 74 ML/MIN/1.73
GLOBULIN UR ELPH-MCNC: 3.5 GM/DL
GLUCOSE BLD-MCNC: 127 MG/DL (ref 65–99)
HCT VFR BLD AUTO: 49.6 % (ref 35.6–45.5)
HGB BLD-MCNC: 16.5 G/DL (ref 11.9–15.5)
IMM GRANULOCYTES # BLD: 0.02 10*3/MM3 (ref 0–0.03)
IMM GRANULOCYTES NFR BLD: 0.3 % (ref 0–0.5)
INR PPP: 1.65 (ref 0.9–1.1)
LYMPHOCYTES # BLD AUTO: 2.54 10*3/MM3 (ref 0.9–4.8)
LYMPHOCYTES NFR BLD AUTO: 36.3 % (ref 19.6–45.3)
MCH RBC QN AUTO: 31.3 PG (ref 26.9–32)
MCHC RBC AUTO-ENTMCNC: 33.3 G/DL (ref 32.4–36.3)
MCV RBC AUTO: 93.9 FL (ref 80.5–98.2)
MONOCYTES # BLD AUTO: 0.5 10*3/MM3 (ref 0.2–1.2)
MONOCYTES NFR BLD AUTO: 7.1 % (ref 5–12)
NEUTROPHILS # BLD AUTO: 3.67 10*3/MM3 (ref 1.9–8.1)
NEUTROPHILS NFR BLD AUTO: 52.4 % (ref 42.7–76)
NT-PROBNP SERPL-MCNC: 118.8 PG/ML (ref 0–900)
PLATELET # BLD AUTO: 248 10*3/MM3 (ref 140–500)
PMV BLD AUTO: 10.8 FL (ref 6–12)
POTASSIUM BLD-SCNC: 3.9 MMOL/L (ref 3.5–5.2)
PROT SERPL-MCNC: 8.1 G/DL (ref 6–8.5)
PROTHROMBIN TIME: 19.3 SECONDS (ref 11.7–14.2)
RBC # BLD AUTO: 5.28 10*6/MM3 (ref 3.9–5.2)
SODIUM BLD-SCNC: 145 MMOL/L (ref 136–145)
TROPONIN T SERPL-MCNC: <0.01 NG/ML (ref 0–0.03)
WBC NRBC COR # BLD: 7 10*3/MM3 (ref 4.5–10.7)

## 2018-02-07 PROCEDURE — 83880 ASSAY OF NATRIURETIC PEPTIDE: CPT | Performed by: EMERGENCY MEDICINE

## 2018-02-07 PROCEDURE — 84484 ASSAY OF TROPONIN QUANT: CPT | Performed by: EMERGENCY MEDICINE

## 2018-02-07 PROCEDURE — 99285 EMERGENCY DEPT VISIT HI MDM: CPT

## 2018-02-07 PROCEDURE — 85730 THROMBOPLASTIN TIME PARTIAL: CPT | Performed by: EMERGENCY MEDICINE

## 2018-02-07 PROCEDURE — 85025 COMPLETE CBC W/AUTO DIFF WBC: CPT | Performed by: EMERGENCY MEDICINE

## 2018-02-07 PROCEDURE — 85610 PROTHROMBIN TIME: CPT | Performed by: EMERGENCY MEDICINE

## 2018-02-07 PROCEDURE — 93005 ELECTROCARDIOGRAM TRACING: CPT | Performed by: EMERGENCY MEDICINE

## 2018-02-07 PROCEDURE — 93010 ELECTROCARDIOGRAM REPORT: CPT | Performed by: INTERNAL MEDICINE

## 2018-02-07 PROCEDURE — 80053 COMPREHEN METABOLIC PANEL: CPT | Performed by: EMERGENCY MEDICINE

## 2018-02-07 RX ORDER — SODIUM CHLORIDE 0.9 % (FLUSH) 0.9 %
10 SYRINGE (ML) INJECTION AS NEEDED
Status: DISCONTINUED | OUTPATIENT
Start: 2018-02-07 | End: 2018-02-07 | Stop reason: HOSPADM

## 2018-02-07 NOTE — ED NOTES
Pt reports he hr is elevated and bp is high starting around 2300 last night     Manuel Munguia RN  02/07/18 0200

## 2018-02-07 NOTE — ED PROVIDER NOTES
" EMERGENCY DEPARTMENT ENCOUNTER    CHIEF COMPLAINT  Chief Complaint: Palpitations   History given by: Pt  History limited by:   Room Number: 17/17  PMD: Parmjit Tidwell MD      HPI:  Pt is a 72 y.o. female who presents complaining of palpitations that began tonight. Pt states that she woke up to go to the bathroom, and noticed that her HR was elevated. Pt states that she checked her pulse, and it was in the 120's. Pt states that she began to feel flushed in her arms, and a \"tight sensation\" in her neck. Pt states that she took an extra half dose of her Amlodipine as previously instructed by her cardiologist. She denies CP or SOA. Hx of a-fib, on Xarelto. Pt states that this feels similar to prior episodes of a-fib.     Duration:  Few hours  Onset: gradual  Timing: constant  Location: Cardiovascular  Radiation: none  Intensity/Severity: moderate  Progression: none  Associated Symptoms: tightness in neck, \"flushed\" sensation  Aggravating Factors: none  Alleviating Factors: none  Previous Episodes: Has hx of similar symptoms  Treatment before arrival: none    PAST MEDICAL HISTORY  Active Ambulatory Problems     Diagnosis Date Noted   • Arteriosclerosis of coronary artery 03/04/2016   • Arthritis 06/14/2016   • Foot pain 06/14/2016   • Histoplasmosis 06/14/2016   • Mixed hyperlipidemia 06/14/2016   • Cannot sleep 06/14/2016   • Lung mass 06/14/2016   • LAD (lymphadenopathy), mediastinal 06/14/2016   • Plantar fasciitis 06/14/2016   • Restless leg 06/14/2016   • Fibromyalgia 12/13/2016   • Hyperglycemia 12/13/2016   • Allergic rhinitis due to allergen 01/10/2017   • Anxiety 02/13/2017   • Bradycardia 02/24/2017   • Essential hypertension 03/24/2017   • Medicare annual wellness visit, subsequent 05/15/2017   • Paroxysmal atrial fibrillation 10/27/2017   • Hypokalemia 01/22/2018   • Sinusitis 01/22/2018     Resolved Ambulatory Problems     Diagnosis Date Noted   • No Resolved Ambulatory Problems     Past Medical History: "   Diagnosis Date   • Abnormal finding on lung imaging    • Arthritis    • Coronary artery disease    • Fibromyalgia    • Foot pain    • Hemorrhoids    • Histoplasmosis    • Hyperlipidemia    • Hypertension    • IBS (irritable bowel syndrome)    • Insomnia    • Lung mass    • Mediastinal lymphadenopathy    • Night sweat    • PAF (paroxysmal atrial fibrillation)    • Plantar fasciitis    • Restless leg syndrome    • Sore throat        PAST SURGICAL HISTORY  Past Surgical History:   Procedure Laterality Date   • CARDIAC CATHETERIZATION  2015   • COLONOSCOPY N/A 10/24/2016    Procedure: COLONOSCOPY;  Surgeon: Lauren Reich MD;  Location: [facility] ENDOSCOPY;  Service:    • COLONOSCOPY W/ POLYPECTOMY  2013    Dr. Lauren Reich   • HYSTERECTOMY     • UPPER GASTROINTESTINAL ENDOSCOPY  11/2015    Dr. Lauren Reich       FAMILY HISTORY  Family History   Problem Relation Age of Onset   • Hypertension Mother    • Heart disease Mother    • Heart disease Father    • Heart attack Father    • Skin cancer Maternal Grandmother    • No Known Problems Maternal Grandfather    • Arthritis Paternal Grandmother    • Heart disease Paternal Grandfather    • Ovarian cancer Maternal Aunt        SOCIAL HISTORY  Social History     Social History   • Marital status:      Spouse name: N/A   • Number of children: N/A   • Years of education: N/A     Occupational History   • Not on file.     Social History Main Topics   • Smoking status: Never Smoker   • Smokeless tobacco: Never Used   • Alcohol use No   • Drug use: No   • Sexual activity: Defer     Other Topics Concern   • Not on file     Social History Narrative       ALLERGIES  Duloxetine; Lisinopril; Losartan; Mobic [meloxicam]; Codeine; Levofloxacin; and Sulfa antibiotics    REVIEW OF SYSTEMS  Review of Systems   Constitutional: Negative for fever.   Eyes: Negative.    Respiratory: Negative for cough and shortness of breath.    Cardiovascular: Negative for chest pain.        Hypertension  "  Gastrointestinal: Negative for abdominal pain, diarrhea and vomiting.   Genitourinary: Negative for dysuria.   Musculoskeletal: Positive for neck pain (\"tightness\" sensation).   Skin: Negative for rash.        Flushed Arms   Allergic/Immunologic: Negative.    Neurological: Negative for weakness and numbness.   Hematological: Negative.    Psychiatric/Behavioral: Negative.    All other systems reviewed and are negative.      PHYSICAL EXAM  ED Triage Vitals   Temp Heart Rate Resp BP SpO2   02/07/18 0200 02/07/18 0200 02/07/18 0200 -- 02/07/18 0200   97.9 °F (36.6 °C) 140 18  98 %      Temp src Heart Rate Source Patient Position BP Location FiO2 (%)   02/07/18 0200 02/07/18 0200 -- -- --   Tympanic Monitor          Physical Exam   Constitutional: She is oriented to person, place, and time and well-developed, well-nourished, and in no distress. No distress.   HENT:   Head: Normocephalic and atraumatic.   Eyes: EOM are normal. Pupils are equal, round, and reactive to light.   Neck: Normal range of motion. Neck supple.   Cardiovascular: Normal rate, regular rhythm and normal heart sounds.    Pulmonary/Chest: Effort normal and breath sounds normal. No respiratory distress.   Abdominal: Soft. There is no tenderness. There is no rebound and no guarding.   Musculoskeletal: Normal range of motion. She exhibits no edema.   Neurological: She is alert and oriented to person, place, and time. She has normal sensation and normal strength.   Skin: Skin is warm and dry. No rash noted.   Psychiatric: Mood and affect normal.   Nursing note and vitals reviewed.      LAB RESULTS  Lab Results (last 24 hours)     Procedure Component Value Units Date/Time    CBC & Differential [[id number]] Collected:  02/07/18 0211    Specimen:  Blood Updated:  02/07/18 0224    Narrative:       The following orders were created for panel order CBC & Differential.  Procedure                               Abnormality         Status                   "   ---------                               -----------         ------                     CBC Auto Differential[[id number]]        Abnormal            Final result                 Please view results for these tests on the individual orders.    Comprehensive Metabolic Panel [[id number]]  (Abnormal) Collected:  02/07/18 0211    Specimen:  Blood Updated:  02/07/18 0243     Glucose 127 (H) mg/dL      BUN 20 mg/dL      Creatinine 0.77 mg/dL      Sodium 145 mmol/L      Potassium 3.9 mmol/L      Chloride 104 mmol/L      CO2 26.3 mmol/L      Calcium 9.9 mg/dL      Total Protein 8.1 g/dL      Albumin 4.60 g/dL      ALT (SGPT) 21 U/L      AST (SGOT) 21 U/L      Alkaline Phosphatase 96 U/L      Total Bilirubin 0.3 mg/dL      eGFR Non African Amer 74 mL/min/1.73      Globulin 3.5 gm/dL      A/G Ratio 1.3 g/dL      BUN/Creatinine Ratio 26.0 (H)     Anion Gap 14.7 mmol/L     Protime-INR [[id number]]  (Abnormal) Collected:  02/07/18 0211    Specimen:  Blood Updated:  02/07/18 0234     Protime 19.3 (H) Seconds      INR 1.65 (H)    aPTT [[id number]]  (Abnormal) Collected:  02/07/18 0211    Specimen:  Blood Updated:  02/07/18 0234     PTT 36.4 (H) seconds     BNP [[id number]]  (Normal) Collected:  02/07/18 0211    Specimen:  Blood Updated:  02/07/18 0242     proBNP 118.8 pg/mL     Narrative:       Among patients with dyspnea, NT-proBNP is highly sensitive for the detection of acute congestive heart failure. In addition NT-proBNP of <300 pg/ml effectively rules out acute congestive heart failure with 99% negative predictive value.    Troponin [[id number]]  (Normal) Collected:  02/07/18 0211    Specimen:  Blood Updated:  02/07/18 0243     Troponin T <0.010 ng/mL     Narrative:       Troponin T Reference Ranges:  Less than 0.03 ng/mL:    Negative for AMI  0.03 to 0.09 ng/mL:      Indeterminant for AMI  Greater than 0.09 ng/mL: Positive for AMI    CBC Auto Differential [[id number]]  (Abnormal) Collected:  02/07/18 0211    Specimen:  Blood  Updated:  02/07/18 0224     WBC 7.00 10*3/mm3      RBC 5.28 (H) 10*6/mm3      Hemoglobin 16.5 (H) g/dL      Hematocrit 49.6 (H) %      MCV 93.9 fL      MCH 31.3 pg      MCHC 33.3 g/dL      RDW 14.2 (H) %      RDW-SD 48.0 fl      MPV 10.8 fL      Platelets 248 10*3/mm3      Neutrophil % 52.4 %      Lymphocyte % 36.3 %      Monocyte % 7.1 %      Eosinophil % 3.6 %      Basophil % 0.3 %      Immature Grans % 0.3 %      Neutrophils, Absolute 3.67 10*3/mm3      Lymphocytes, Absolute 2.54 10*3/mm3      Monocytes, Absolute 0.50 10*3/mm3      Eosinophils, Absolute 0.25 10*3/mm3      Basophils, Absolute 0.02 10*3/mm3      Immature Grans, Absolute 0.02 10*3/mm3           I ordered the above labs and reviewed the results    PROCEDURES  Procedures  EKG           EKG time: 2:09AM  Rhythm/Rate: 97, nml  P waves and MN: nml  QRS, axis: nml   ST and T waves: nlm     Interpreted Contemporaneously by me, independently viewed  Improved compared to prior on 10/09/17      PROGRESS AND CONSULTS  ED Course   2:03AM  Ordered EKG for further evaluation.    2:10AM  Ordered blood work for further evaluation.  Ordered IVF for hydration.    3:49 AM  BP- 125/69 HR- 71 Temp- 97.9 °F (36.6 °C) (Tympanic) O2 sat- 96%  Rechecked the patient who is in NAD and is resting comfortably. Pt has remained in NSR while in ED. Advised pt that the workup in the ED shows NAD, including a negative troponin. I then informed her of the plan to discharge her. Pt is to f/u with her cardiologist. Pt understands and agrees with the plan, all questions answered.    MEDICAL DECISION MAKING  Results were reviewed/discussed with the patient and they were also made aware of online access. Pt also made aware that some labs, such as cultures, will not be resulted during ER visit and follow up with PMD is necessary.     MDM  Number of Diagnoses or Management Options  Paroxysmal atrial fibrillation:      Amount and/or Complexity of Data Reviewed  Clinical lab tests: ordered  and reviewed (Troponin is <0.010  )  Tests in the medicine section of CPT®: reviewed and ordered (See Procedure Note)  Decide to obtain previous medical records or to obtain history from someone other than the patient: yes (10/9/17- visit for Paroxysmal atrial fibrillation.)  Review and summarize past medical records: yes           DIAGNOSIS  Final diagnoses:   Paroxysmal atrial fibrillation       DISPOSITION  DISCHARGE    Patient discharged in stable condition.    Reviewed implications of results, diagnosis, meds, responsibility to follow up, warning signs and symptoms of possible worsening, potential complications and reasons to return to ER.    Patient/Family voiced understanding of above instructions.    Discussed plan for discharge, as there is no emergent indication for admission.  Pt/family is agreeable and understands need for follow up and repeat testing.  Pt is aware that discharge does not mean that nothing is wrong but it indicates no emergency is present that requires admission and they must continue care with follow-up as given below or physician of their choice.     FOLLOW-UP  Mango Linda MD  [address] [address]  [phone]      As needed         Medication List      Notice     No changes were made to your prescriptions during this visit.            Latest Documented Vital Signs:  As of 6:30 AM  BP- 125/69 HR- 71 Temp- 97.9 °F (36.6 °C) (Tympanic) O2 sat- 96%    --  Documentation assistance provided by lars Resendiz and Racquel Mccord for Dr.Brett James.  Information recorded by the scribe was done at my direction and has been verified and validated by me.          Anthony Resendiz  02/07/18 2635       Racquel Mccord  02/07/18 1019       George James MD  02/07/18 2118

## 2018-02-08 ENCOUNTER — TELEPHONE (OUTPATIENT)
Dept: SOCIAL WORK | Facility: HOSPITAL | Age: 73
End: 2018-02-08

## 2018-02-08 ENCOUNTER — TELEPHONE (OUTPATIENT)
Dept: CARDIOLOGY | Facility: CLINIC | Age: 73
End: 2018-02-08

## 2018-02-08 NOTE — TELEPHONE ENCOUNTER
02/08/18  1:24 PM  Sera Mccord  1945    Home Phone [phone]   Mobile [phone]     Ms. Mccord was seen in [facility] ER last night for palpitations and took an extra dose of flecainide. She states her baseline HR is normally 40-50 and /60s. Today, HR is 84 with /89.    She takes 25mg flecainide BID, xarelto, and 5mg amlodipine daily. Does she need medication adjustment?     Does she need ER follow-up? She is scheduled to see Damaris on 3/23.    Sandra ANDRES RN

## 2018-02-08 NOTE — TELEPHONE ENCOUNTER
ER F/U phone call:   Spoke at length with pt re her heart rate. She is currently waiting for a call from [facility].  Advised pt that if she feels like she needs to RTER , then to do so. Verbalized understanding. Taking medications as ordered. No other questions or concerns voiced at this time. Estephanie Jacobsen RN

## 2018-02-12 NOTE — TELEPHONE ENCOUNTER
I called and left a message I think we can stay with the pill in the pocket approach and see if she does

## 2018-02-14 ENCOUNTER — OFFICE VISIT (OUTPATIENT)
Dept: INTERNAL MEDICINE | Facility: CLINIC | Age: 73
End: 2018-02-14

## 2018-02-14 VITALS
SYSTOLIC BLOOD PRESSURE: 126 MMHG | HEART RATE: 46 BPM | RESPIRATION RATE: 16 BRPM | BODY MASS INDEX: 31 KG/M2 | OXYGEN SATURATION: 99 % | WEIGHT: 164.2 LBS | HEIGHT: 61 IN | DIASTOLIC BLOOD PRESSURE: 70 MMHG

## 2018-02-14 DIAGNOSIS — D75.1 ERYTHROCYTOSIS: ICD-10-CM

## 2018-02-14 DIAGNOSIS — E78.2 MIXED HYPERLIPIDEMIA: ICD-10-CM

## 2018-02-14 DIAGNOSIS — I10 ESSENTIAL HYPERTENSION: ICD-10-CM

## 2018-02-14 DIAGNOSIS — I48.0 PAROXYSMAL ATRIAL FIBRILLATION (HCC): ICD-10-CM

## 2018-02-14 DIAGNOSIS — R51.9 NONINTRACTABLE HEADACHE, UNSPECIFIED CHRONICITY PATTERN, UNSPECIFIED HEADACHE TYPE: Primary | ICD-10-CM

## 2018-02-14 PROBLEM — J32.9 SINUSITIS: Status: RESOLVED | Noted: 2018-01-22 | Resolved: 2018-02-14

## 2018-02-14 PROBLEM — E87.6 HYPOKALEMIA: Status: RESOLVED | Noted: 2018-01-22 | Resolved: 2018-02-14

## 2018-02-14 PROCEDURE — 99214 OFFICE O/P EST MOD 30 MIN: CPT | Performed by: FAMILY MEDICINE

## 2018-02-14 RX ORDER — ATORVASTATIN CALCIUM 10 MG/1
10 TABLET, FILM COATED ORAL DAILY
Qty: 90 TABLET | Refills: 3 | Status: SHIPPED | OUTPATIENT
Start: 2018-02-14 | End: 2018-07-09

## 2018-02-14 RX ORDER — CYCLOBENZAPRINE HCL 5 MG
5 TABLET ORAL NIGHTLY
Qty: 30 TABLET | Refills: 0 | Status: SHIPPED | OUTPATIENT
Start: 2018-02-14 | End: 2018-07-23

## 2018-02-14 NOTE — PROGRESS NOTES
Sera Mccord is a 72 y.o. female.      Assessment/Plan   Problem List Items Addressed This Visit        Cardiovascular and Mediastinum    Mixed hyperlipidemia    Overview     Description: Most recent blood work August 2013         Relevant Medications    atorvastatin (LIPITOR) 10 MG tablet    Essential hypertension    Paroxysmal atrial fibrillation       Hematopoietic and Hemostatic    Erythrocytosis    Relevant Orders    CBC & Differential      Other Visit Diagnoses     Nonintractable headache, unspecified chronicity pattern, unspecified headache type    -  Primary           Follow-up results of blood work continue present management of hyperlipidemia rate control monitor for any worsening evidence of a headache in the follow-up the erythrocytosis results scheduled routine follow-up in 3 months or as needed      Chief Complaint   Patient presents with   • follow up for ER visit for Afib.   • follow up for hypertension   • Headache     at night after lying down x 1 month     Social History   Substance Use Topics   • Smoking status: Never Smoker   • Smokeless tobacco: Never Used   • Alcohol use No       History of Present Illness   Near for follow-up for poorly-controlled A. fib after emergency department visit labs from the emergency department oral reviewed and negative she is presently feeling better with no acute concerns blood pressure is well controlled she also has not had any recurrence of fast heart rate and seems to be trending lower at this point she does have a distant history of some elevated hemoglobin and hematocrit over the last couple blood works with no recent fever or weight loss or chills or cough  The following portions of the patient's history were reviewed and updated as appropriate:PMHroutine: Social history , Past Medical History, Allergies, Current Medications, Active Problem List, Family History and Health Maintenance    Review of Systems   Constitutional: Negative.    HENT: Negative.   "  Eyes: Negative.    Respiratory: Negative.    Cardiovascular: Negative.    Gastrointestinal: Negative.    Genitourinary: Negative.    Musculoskeletal: Negative for myalgias.   Neurological: Negative.    Hematological: Negative.    Psychiatric/Behavioral: Negative.        Objective   Vitals:    02/14/18 1135   BP: 126/70   BP Location: Right arm   Patient Position: Sitting   Cuff Size: Adult   Pulse: (!) 46   Resp: 16   SpO2: 99%   Weight: 74.5 kg (164 lb 3.2 oz)   Height: 154.9 cm (61\")     Body mass index is 31.03 kg/(m^2).  Physical Exam   Constitutional: She is oriented to person, place, and time. She appears well-developed and well-nourished. No distress.   HENT:   Head: Normocephalic and atraumatic.   Eyes: Conjunctivae and EOM are normal. Pupils are equal, round, and reactive to light. Right eye exhibits no discharge. Left eye exhibits no discharge. No scleral icterus.   Neck: Normal range of motion. Neck supple. No tracheal deviation present. No thyromegaly present.   Cardiovascular: Normal rate, regular rhythm, normal heart sounds, intact distal pulses and normal pulses.  Exam reveals no gallop.    No murmur heard.  Pulmonary/Chest: Effort normal and breath sounds normal. No respiratory distress. She has no wheezes. She has no rales.   Musculoskeletal: Normal range of motion.   Neurological: She is alert and oriented to person, place, and time. She exhibits normal muscle tone. Coordination normal.   Skin: Skin is warm. No rash noted. No erythema. No pallor.   Psychiatric: She has a normal mood and affect. Her behavior is normal. Judgment and thought content normal.   Nursing note and vitals reviewed.    Reviewed Data:  Admission on 02/07/2018, Discharged on 02/07/2018   Component Date Value Ref Range Status   • Glucose 02/07/2018 127* 65 - 99 mg/dL Final   • BUN 02/07/2018 20  8 - 23 mg/dL Final   • Creatinine 02/07/2018 0.77  0.57 - 1.00 mg/dL Final   • Sodium 02/07/2018 145  136 - 145 mmol/L Final   • " Potassium 02/07/2018 3.9  3.5 - 5.2 mmol/L Final   • Chloride 02/07/2018 104  98 - 107 mmol/L Final   • CO2 02/07/2018 26.3  22.0 - 29.0 mmol/L Final   • Calcium 02/07/2018 9.9  8.6 - 10.5 mg/dL Final   • Total Protein 02/07/2018 8.1  6.0 - 8.5 g/dL Final   • Albumin 02/07/2018 4.60  3.50 - 5.20 g/dL Final   • ALT (SGPT) 02/07/2018 21  1 - 33 U/L Final   • AST (SGOT) 02/07/2018 21  1 - 32 U/L Final   • Alkaline Phosphatase 02/07/2018 96  39 - 117 U/L Final   • Total Bilirubin 02/07/2018 0.3  0.1 - 1.2 mg/dL Final   • eGFR Non African Amer 02/07/2018 74  >60 mL/min/1.73 Final   • Globulin 02/07/2018 3.5  gm/dL Final   • A/G Ratio 02/07/2018 1.3  g/dL Final   • BUN/Creatinine Ratio 02/07/2018 26.0* 7.0 - 25.0 Final   • Anion Gap 02/07/2018 14.7  mmol/L Final   • Protime 02/07/2018 19.3* 11.7 - 14.2 Seconds Final   • INR 02/07/2018 1.65* 0.90 - 1.10 Final   • PTT 02/07/2018 36.4* 22.7 - 35.4 seconds Final   • proBNP 02/07/2018 118.8  0.0 - 900.0 pg/mL Final   • Troponin T 02/07/2018 <0.010  0.000 - 0.030 ng/mL Final   • WBC 02/07/2018 7.00  4.50 - 10.70 10*3/mm3 Final   • RBC 02/07/2018 5.28* 3.90 - 5.20 10*6/mm3 Final   • Hemoglobin 02/07/2018 16.5* 11.9 - 15.5 g/dL Final   • Hematocrit 02/07/2018 49.6* 35.6 - 45.5 % Final   • MCV 02/07/2018 93.9  80.5 - 98.2 fL Final   • MCH 02/07/2018 31.3  26.9 - 32.0 pg Final   • MCHC 02/07/2018 33.3  32.4 - 36.3 g/dL Final   • RDW 02/07/2018 14.2* 11.7 - 13.0 % Final   • RDW-SD 02/07/2018 48.0  37.0 - 54.0 fl Final   • MPV 02/07/2018 10.8  6.0 - 12.0 fL Final   • Platelets 02/07/2018 248  140 - 500 10*3/mm3 Final   • Neutrophil % 02/07/2018 52.4  42.7 - 76.0 % Final   • Lymphocyte % 02/07/2018 36.3  19.6 - 45.3 % Final   • Monocyte % 02/07/2018 7.1  5.0 - 12.0 % Final   • Eosinophil % 02/07/2018 3.6  0.3 - 6.2 % Final   • Basophil % 02/07/2018 0.3  0.0 - 1.5 % Final   • Immature Grans % 02/07/2018 0.3  0.0 - 0.5 % Final   • Neutrophils, Absolute 02/07/2018 3.67  1.90 - 8.10  10*3/mm3 Final   • Lymphocytes, Absolute 02/07/2018 2.54  0.90 - 4.80 10*3/mm3 Final   • Monocytes, Absolute 02/07/2018 0.50  0.20 - 1.20 10*3/mm3 Final   • Eosinophils, Absolute 02/07/2018 0.25  0.00 - 0.70 10*3/mm3 Final   • Basophils, Absolute 02/07/2018 0.02  0.00 - 0.20 10*3/mm3 Final   • Immature Grans, Absolute 02/07/2018 0.02  0.00 - 0.03 10*3/mm3 Final   Office Visit on 01/22/2018   Component Date Value Ref Range Status   • Glucose 01/22/2018 86  65 - 99 mg/dL Final   • BUN 01/22/2018 14  8 - 23 mg/dL Final   • Creatinine 01/22/2018 0.79  0.57 - 1.00 mg/dL Final   • eGFR Non  Am 01/22/2018 72  >60 mL/min/1.73 Final   • eGFR African Am 01/22/2018 87  >60 mL/min/1.73 Final   • BUN/Creatinine Ratio 01/22/2018 17.7  7.0 - 25.0 Final   • Sodium 01/22/2018 145  136 - 145 mmol/L Final   • Potassium 01/22/2018 4.9  3.5 - 5.2 mmol/L Final   • Chloride 01/22/2018 103  98 - 107 mmol/L Final   • Total CO2 01/22/2018 27.5  22.0 - 29.0 mmol/L Final   • Calcium 01/22/2018 9.7  8.6 - 10.5 mg/dL Final

## 2018-02-19 ENCOUNTER — PATIENT OUTREACH (OUTPATIENT)
Dept: CASE MANAGEMENT | Facility: OTHER | Age: 73
End: 2018-02-19

## 2018-02-19 NOTE — OUTREACH NOTE
Pt. Reports she has not had any further episodes of elevated HR. She has seen her PCP and will return 3/15. Cardiologist will see her 3/23/18. Explained role of Care Advisor and contact information given to patient.No other questions or concerns voiced at this time. Reviewed Gaps in Care and need to schedule Annual Wellness Visit.

## 2018-02-28 ENCOUNTER — APPOINTMENT (OUTPATIENT)
Dept: GENERAL RADIOLOGY | Facility: HOSPITAL | Age: 73
End: 2018-02-28

## 2018-02-28 ENCOUNTER — HOSPITAL ENCOUNTER (EMERGENCY)
Facility: HOSPITAL | Age: 73
Discharge: HOME | End: 2018-02-28
Attending: EMERGENCY MEDICINE | Admitting: EMERGENCY MEDICINE

## 2018-02-28 VITALS
HEART RATE: 80 BPM | BODY MASS INDEX: 29.83 KG/M2 | SYSTOLIC BLOOD PRESSURE: 142 MMHG | OXYGEN SATURATION: 96 % | WEIGHT: 158 LBS | HEIGHT: 61 IN | RESPIRATION RATE: 18 BRPM | TEMPERATURE: 98.4 F | DIASTOLIC BLOOD PRESSURE: 75 MMHG

## 2018-02-28 DIAGNOSIS — I48.0 PAROXYSMAL ATRIAL FIBRILLATION (HCC): Primary | ICD-10-CM

## 2018-02-28 LAB
ALBUMIN SERPL-MCNC: 4.5 G/DL (ref 3.5–5.2)
ALBUMIN/GLOB SERPL: 1.5 G/DL
ALP SERPL-CCNC: 77 U/L (ref 39–117)
ALT SERPL W P-5'-P-CCNC: 17 U/L (ref 1–33)
ANION GAP SERPL CALCULATED.3IONS-SCNC: 15.3 MMOL/L
AST SERPL-CCNC: 20 U/L (ref 1–32)
BASOPHILS # BLD AUTO: 0.01 10*3/MM3 (ref 0–0.2)
BASOPHILS NFR BLD AUTO: 0.1 % (ref 0–1.5)
BILIRUB SERPL-MCNC: 0.4 MG/DL (ref 0.1–1.2)
BUN BLD-MCNC: 12 MG/DL (ref 8–23)
BUN/CREAT SERPL: 14.3 (ref 7–25)
CALCIUM SPEC-SCNC: 9.7 MG/DL (ref 8.6–10.5)
CHLORIDE SERPL-SCNC: 107 MMOL/L (ref 98–107)
CO2 SERPL-SCNC: 26.7 MMOL/L (ref 22–29)
CREAT BLD-MCNC: 0.84 MG/DL (ref 0.57–1)
DEPRECATED RDW RBC AUTO: 47.1 FL (ref 37–54)
EOSINOPHIL # BLD AUTO: 0.23 10*3/MM3 (ref 0–0.7)
EOSINOPHIL NFR BLD AUTO: 3 % (ref 0.3–6.2)
ERYTHROCYTE [DISTWIDTH] IN BLOOD BY AUTOMATED COUNT: 14.2 % (ref 11.7–13)
GFR SERPL CREATININE-BSD FRML MDRD: 67 ML/MIN/1.73
GLOBULIN UR ELPH-MCNC: 3.1 GM/DL
GLUCOSE BLD-MCNC: 184 MG/DL (ref 65–99)
HCT VFR BLD AUTO: 45.9 % (ref 35.6–45.5)
HGB BLD-MCNC: 15.2 G/DL (ref 11.9–15.5)
IMM GRANULOCYTES # BLD: 0 10*3/MM3 (ref 0–0.03)
IMM GRANULOCYTES NFR BLD: 0 % (ref 0–0.5)
LYMPHOCYTES # BLD AUTO: 2.08 10*3/MM3 (ref 0.9–4.8)
LYMPHOCYTES NFR BLD AUTO: 27.5 % (ref 19.6–45.3)
MAGNESIUM SERPL-MCNC: 2.2 MG/DL (ref 1.6–2.4)
MCH RBC QN AUTO: 30.6 PG (ref 26.9–32)
MCHC RBC AUTO-ENTMCNC: 33.1 G/DL (ref 32.4–36.3)
MCV RBC AUTO: 92.4 FL (ref 80.5–98.2)
MONOCYTES # BLD AUTO: 0.64 10*3/MM3 (ref 0.2–1.2)
MONOCYTES NFR BLD AUTO: 8.5 % (ref 5–12)
NEUTROPHILS # BLD AUTO: 4.61 10*3/MM3 (ref 1.9–8.1)
NEUTROPHILS NFR BLD AUTO: 60.9 % (ref 42.7–76)
NT-PROBNP SERPL-MCNC: 97.5 PG/ML (ref 5–900)
PLATELET # BLD AUTO: 258 10*3/MM3 (ref 140–500)
PMV BLD AUTO: 11 FL (ref 6–12)
POTASSIUM BLD-SCNC: 3.6 MMOL/L (ref 3.5–5.2)
PROT SERPL-MCNC: 7.6 G/DL (ref 6–8.5)
RBC # BLD AUTO: 4.97 10*6/MM3 (ref 3.9–5.2)
SODIUM BLD-SCNC: 149 MMOL/L (ref 136–145)
TROPONIN T SERPL-MCNC: <0.01 NG/ML (ref 0–0.03)
WBC NRBC COR # BLD: 7.57 10*3/MM3 (ref 4.5–10.7)

## 2018-02-28 PROCEDURE — 85025 COMPLETE CBC W/AUTO DIFF WBC: CPT | Performed by: EMERGENCY MEDICINE

## 2018-02-28 PROCEDURE — 80053 COMPREHEN METABOLIC PANEL: CPT | Performed by: EMERGENCY MEDICINE

## 2018-02-28 PROCEDURE — 93005 ELECTROCARDIOGRAM TRACING: CPT

## 2018-02-28 PROCEDURE — 99284 EMERGENCY DEPT VISIT MOD MDM: CPT

## 2018-02-28 PROCEDURE — 83735 ASSAY OF MAGNESIUM: CPT | Performed by: EMERGENCY MEDICINE

## 2018-02-28 PROCEDURE — 71045 X-RAY EXAM CHEST 1 VIEW: CPT

## 2018-02-28 PROCEDURE — 93010 ELECTROCARDIOGRAM REPORT: CPT | Performed by: INTERNAL MEDICINE

## 2018-02-28 PROCEDURE — 84484 ASSAY OF TROPONIN QUANT: CPT | Performed by: EMERGENCY MEDICINE

## 2018-02-28 PROCEDURE — 83880 ASSAY OF NATRIURETIC PEPTIDE: CPT | Performed by: EMERGENCY MEDICINE

## 2018-02-28 RX ORDER — SODIUM CHLORIDE 0.9 % (FLUSH) 0.9 %
10 SYRINGE (ML) INJECTION AS NEEDED
Status: DISCONTINUED | OUTPATIENT
Start: 2018-02-28 | End: 2018-02-28 | Stop reason: HOSPADM

## 2018-03-01 ENCOUNTER — TELEPHONE (OUTPATIENT)
Dept: CARDIOLOGY | Facility: CLINIC | Age: 73
End: 2018-03-01

## 2018-03-01 NOTE — ED NOTES
Per Dr. Carlos pt to take 1/2 tablet of flecaindine acertate 50mg of home meds.      Madison Hastings RN  02/28/18 2002

## 2018-03-01 NOTE — ED PROVIDER NOTES
"EMERGENCY DEPARTMENT ENCOUNTER    CHIEF COMPLAINT  Chief Complaint: Palpitations   History given by: pt/ family is present at bedside   History limited by: N/A  Room Number: 05/05  PMD: Parmjit Tidwell MD  Cardiologist: Dr. Linda    HPI:  Pt is a 72 y.o. female who presents complaining of constant palpitations with an acute onset that began earlier this afternoon. Pt states she began not feeling well at approximately 1600. Pt measured her HR and observed a HR of 132 and reports being hypertensive at this time. Pt states her normal HR is between 44-50. Pt states she just \"feels funny\", but denies SOA, cough, fever, chills, CP, or any other pertinent symptoms. Pt is currently taking Xarelto. Pt states she is not currently on beta-blockers. Pt has a hx of A-fib.     Duration: began earlier this afternoon   Onset: acute onset   Timing: constant   Location: N/A  Radiation: N/A  Quality: palpitations   Intensity/Severity: moderate   Progression: unchanged   Associated Symptoms: Rapid heart rate  Aggravating Factors: None reported   Alleviating Factors: None reported   Previous Episodes: Pt has a hx of A-fib.   Treatment before arrival: Pt is currently taking Xarelto, but denies taking any beta blockers.     PAST MEDICAL HISTORY  Active Ambulatory Problems     Diagnosis Date Noted   • Arteriosclerosis of coronary artery 03/04/2016   • Arthritis 06/14/2016   • Foot pain 06/14/2016   • Histoplasmosis 06/14/2016   • Mixed hyperlipidemia 06/14/2016   • Cannot sleep 06/14/2016   • Lung mass 06/14/2016   • LAD (lymphadenopathy), mediastinal 06/14/2016   • Plantar fasciitis 06/14/2016   • Restless leg 06/14/2016   • Fibromyalgia 12/13/2016   • Hyperglycemia 12/13/2016   • Allergic rhinitis due to allergen 01/10/2017   • Anxiety 02/13/2017   • Bradycardia 02/24/2017   • Essential hypertension 03/24/2017   • Medicare annual wellness visit, subsequent 05/15/2017   • Paroxysmal atrial fibrillation 10/27/2017   • Erythrocytosis " 02/14/2018     Resolved Ambulatory Problems     Diagnosis Date Noted   • Hypokalemia 01/22/2018   • Sinusitis 01/22/2018     Past Medical History:   Diagnosis Date   • Abnormal finding on lung imaging    • Arthritis    • Coronary artery disease    • Fibromyalgia    • Foot pain    • Hemorrhoids    • Histoplasmosis    • Hyperlipidemia    • Hypertension    • IBS (irritable bowel syndrome)    • Insomnia    • Lung mass    • Mediastinal lymphadenopathy    • Night sweat    • PAF (paroxysmal atrial fibrillation)    • Plantar fasciitis    • Restless leg syndrome    • Sore throat        PAST SURGICAL HISTORY  Past Surgical History:   Procedure Laterality Date   • CARDIAC CATHETERIZATION  2015   • COLONOSCOPY N/A 10/24/2016    Procedure: COLONOSCOPY;  Surgeon: Lauren Reich MD;  Location: [facility] ENDOSCOPY;  Service:    • COLONOSCOPY W/ POLYPECTOMY  2013    Dr. Lauren Reich   • HYSTERECTOMY     • UPPER GASTROINTESTINAL ENDOSCOPY  11/2015    Dr. Lauren Reich       FAMILY HISTORY  Family History   Problem Relation Age of Onset   • Hypertension Mother    • Heart disease Mother    • Heart disease Father    • Heart attack Father    • Skin cancer Maternal Grandmother    • No Known Problems Maternal Grandfather    • Arthritis Paternal Grandmother    • Heart disease Paternal Grandfather    • Ovarian cancer Maternal Aunt        SOCIAL HISTORY  Social History     Social History   • Marital status:      Spouse name: N/A   • Number of children: N/A   • Years of education: N/A     Occupational History   • Not on file.     Social History Main Topics   • Smoking status: Never Smoker   • Smokeless tobacco: Never Used   • Alcohol use No   • Drug use: No   • Sexual activity: Defer     Other Topics Concern   • Not on file     Social History Narrative       ALLERGIES  Duloxetine; Lisinopril; Losartan; Mobic [meloxicam]; Codeine; Levofloxacin; and Sulfa antibiotics    REVIEW OF SYSTEMS  Review of Systems   Constitutional: Negative.   Negative for chills and fever.   HENT: Negative.  Negative for sore throat.    Eyes: Negative.    Respiratory: Negative.  Negative for cough and shortness of breath.    Cardiovascular: Positive for palpitations. Negative for chest pain.        Rapid heart rate    Gastrointestinal: Negative.    Genitourinary: Negative.  Negative for dysuria.   Musculoskeletal: Negative.  Negative for back pain.   Skin: Negative.  Negative for rash.   Neurological: Negative.  Negative for headaches.       PHYSICAL EXAM  ED Triage Vitals   Temp Heart Rate Resp BP SpO2   02/28/18 1923 02/28/18 1923 02/28/18 1923 02/28/18 1941 02/28/18 1923   97.9 °F (36.6 °C) 128 18 163/95 98 %      Temp src Heart Rate Source Patient Position BP Location FiO2 (%)   02/28/18 1923 02/28/18 1923 -- -- --   Tympanic Monitor          Physical Exam   Constitutional: She is oriented to person, place, and time and well-developed, well-nourished, and in no distress. No distress.   HENT:   Head: Normocephalic and atraumatic.   Eyes: EOM are normal. Pupils are equal, round, and reactive to light.   Neck: Normal range of motion. Neck supple.   Cardiovascular: Normal heart sounds and intact distal pulses.  An irregularly irregular rhythm present. Tachycardia present.    Pulmonary/Chest: Effort normal and breath sounds normal. No respiratory distress.   Lungs clear to auscultation bilaterally    Abdominal: Soft. There is no tenderness. There is no rebound and no guarding.   Musculoskeletal: Normal range of motion. She exhibits tenderness (No calf tenderness ). She exhibits no edema.   Neurological: She is alert and oriented to person, place, and time. She has normal sensation and normal strength.   Skin: Skin is warm and dry. No rash noted.   Psychiatric: Mood and affect normal.   Nursing note and vitals reviewed.      LAB RESULTS  Lab Results (last 24 hours)     Procedure Component Value Units Date/Time    CBC & Differential [[id number]] Collected:  02/28/18 1956     Specimen:  Blood Updated:  02/28/18 2008    Narrative:       The following orders were created for panel order CBC & Differential.  Procedure                               Abnormality         Status                     ---------                               -----------         ------                     CBC Auto Differential[[id number]]        Abnormal            Final result                 Please view results for these tests on the individual orders.    Comprehensive Metabolic Panel [[id number]]  (Abnormal) Collected:  02/28/18 1956    Specimen:  Blood Updated:  02/28/18 2032     Glucose 184 (H) mg/dL      BUN 12 mg/dL      Creatinine 0.84 mg/dL      Sodium 149 (H) mmol/L      Potassium 3.6 mmol/L      Chloride 107 mmol/L      CO2 26.7 mmol/L      Calcium 9.7 mg/dL      Total Protein 7.6 g/dL      Albumin 4.50 g/dL      ALT (SGPT) 17 U/L      AST (SGOT) 20 U/L      Alkaline Phosphatase 77 U/L      Total Bilirubin 0.4 mg/dL      eGFR Non African Amer 67 mL/min/1.73      Globulin 3.1 gm/dL      A/G Ratio 1.5 g/dL      BUN/Creatinine Ratio 14.3     Anion Gap 15.3 mmol/L     BNP [[id number]]  (Normal) Collected:  02/28/18 1956    Specimen:  Blood Updated:  02/28/18 2030     proBNP 97.5 pg/mL     Narrative:       Among patients with dyspnea, NT-proBNP is highly sensitive for the detection of acute congestive heart failure. In addition NT-proBNP of <300 pg/ml effectively rules out acute congestive heart failure with 99% negative predictive value.    Troponin [[id number]]  (Normal) Collected:  02/28/18 1956    Specimen:  Blood Updated:  02/28/18 2032     Troponin T <0.010 ng/mL     Narrative:       Troponin T Reference Ranges:  Less than 0.03 ng/mL:    Negative for AMI  0.03 to 0.09 ng/mL:      Indeterminant for AMI  Greater than 0.09 ng/mL: Positive for AMI    Magnesium [[id number]]  (Normal) Collected:  02/28/18 1956    Specimen:  Blood Updated:  02/28/18 2032     Magnesium 2.2 mg/dL     CBC Auto Differential [[id number]]   (Abnormal) Collected:  02/28/18 1956    Specimen:  Blood Updated:  02/28/18 2008     WBC 7.57 10*3/mm3      RBC 4.97 10*6/mm3      Hemoglobin 15.2 g/dL      Hematocrit 45.9 (H) %      MCV 92.4 fL      MCH 30.6 pg      MCHC 33.1 g/dL      RDW 14.2 (H) %      RDW-SD 47.1 fl      MPV 11.0 fL      Platelets 258 10*3/mm3      Neutrophil % 60.9 %      Lymphocyte % 27.5 %      Monocyte % 8.5 %      Eosinophil % 3.0 %      Basophil % 0.1 %      Immature Grans % 0.0 %      Neutrophils, Absolute 4.61 10*3/mm3      Lymphocytes, Absolute 2.08 10*3/mm3      Monocytes, Absolute 0.64 10*3/mm3      Eosinophils, Absolute 0.23 10*3/mm3      Basophils, Absolute 0.01 10*3/mm3      Immature Grans, Absolute 0.00 10*3/mm3           I ordered the above labs and reviewed the results    RADIOLOGY  XR Chest 1 View   Final Result   No evidence for acute pulmonary process. Follow-up as   clinical indications persist.       This report was finalized on 2/28/2018 8:36 PM by Dr. Kiran Sunshine MD.               I ordered the above noted radiological studies. Interpreted by radiologist. Reviewed by me in PACS.       PROCEDURES  Procedures  EKG           EKG time: 1929  Rhythm/Rate: A-fib with rate of 114  P waves and NJ: irregular P waves, varying JAYA  QRS, axis: LAD, incomplete RBBB   ST and T waves: Nonspecific ST changes      Interpreted Contemporaneously by me, independently viewed  changed compared to prior 2/7/18  Sinus rhythm present at that time       PROGRESS AND CONSULTS  ED Course     1955  Ordered IVF, labs, and Chest XR for further evaluation.     2130  Rechecked pt who is resting comfortably and in no acute distress. Discussed lab/ radiology results with pt/ family. Pt is currently in sinus rhythm with her HR in the 80's. Discussed that pt needs to call Dr. Linda's office [cardiology] tomorrow. Discussed discharge plans with pt. PT understands and agrees to plan, all questions addressed at this time.  Pt has a current BP of  142/75 at this time.   Pt took an extra half a pill of her Flecainide while in the ER; pt was instructed to do this by her cardiologist during similar episodes. Pt states this has resolved pt's episodes in the past. Recommend pt do as her cardiologist instructed her to during future similar episodes.     MEDICAL DECISION MAKING  Results were reviewed/discussed with the patient and they were also made aware of online access. Pt also made aware that some labs, such as cultures, will not be resulted during ER visit and follow up with PMD is necessary.     MDM  Number of Diagnoses or Management Options  Paroxysmal atrial fibrillation:   Diagnosis management comments: Patient presented to the ER with atrial fibrillation.  Labs were unremarkable.  Patient took an extra half tablet of her flecainide while in the ER.  After this, patient went into sinus rhythm with a rate in the 80s.  Patient was advised to follow-up with Dr. Linda.       Amount and/or Complexity of Data Reviewed  Clinical lab tests: ordered and reviewed (Unremarkable )  Tests in the radiology section of CPT®: ordered and reviewed (Chest XR Impression   No evidence for acute pulmonary process. Follow-up as  clinical indications persist.    )  Tests in the medicine section of CPT®: ordered and reviewed (Refer to procedure )  Decide to obtain previous medical records or to obtain history from someone other than the patient: yes  Review and summarize past medical records: yes (Pt was seen in the ER approxiamtely 3 weeks ago for proximal A-fib. Pt had a cardiac cath performed in January 2015 which showed normal coronary arteries. )  Independent visualization of images, tracings, or specimens: yes           DIAGNOSIS  Final diagnoses:   Paroxysmal atrial fibrillation       DISPOSITION  DISCHARGE    Patient discharged in stable condition.    Reviewed implications of results, diagnosis, meds, responsibility to follow up, warning signs and symptoms of possible  worsening, potential complications and reasons to return to ER.    Patient/Family voiced understanding of above instructions.    Discussed plan for discharge, as there is no emergent indication for admission.  Pt/family is agreeable and understands need for follow up and repeat testing.  Pt is aware that discharge does not mean that nothing is wrong but it indicates no emergency is present that requires admission and they must continue care with follow-up as given below or physician of their choice.     FOLLOW-UP  Mango Linda MD  [address] [address]  [phone]    Call in 1 day           Medication List      Changed          cyclobenzaprine 5 MG tablet   Commonly known as:  FLEXERIL   Take 1 tablet by mouth Every Night.   What changed:    - when to take this  - reasons to take this               Latest Documented Vital Signs:  As of 9:41 PM  BP- 139/73 HR- 87 Temp- 97.9 °F (36.6 °C) (Tympanic) O2 sat- 96%    --  Documentation assistance provided by lars Clement for Dr. Carlos.  Information recorded by the scribe was done at my direction and has been verified and validated by me.     Bladimir Clement  02/28/18 5110       Mango Carlos MD  02/28/18 6382

## 2018-03-01 NOTE — TELEPHONE ENCOUNTER
Patient was told to call you and let you know that she was seen in the ER last night.  Her heart rate went high.  It was 132 when she left her house.  Her normal is 44-50.  She has an appointment with Damaris on 03/23/18.  Should she be seen sooner?  Her heart rate now is in the upper 50's.  It keeps going up and down though.      Thanks,  Shy    [phone]    No medications in the hospital last night were made.

## 2018-03-01 NOTE — ED NOTES
Pt reports flushed felling when her HR increases to 110-120 and in atrial fib. Dr. Carlos at bedside.      Madison Hastings RN  02/28/18 2005

## 2018-03-06 ENCOUNTER — OFFICE VISIT (OUTPATIENT)
Dept: CARDIOLOGY | Facility: CLINIC | Age: 73
End: 2018-03-06

## 2018-03-06 VITALS
HEART RATE: 41 BPM | SYSTOLIC BLOOD PRESSURE: 128 MMHG | DIASTOLIC BLOOD PRESSURE: 70 MMHG | BODY MASS INDEX: 30.21 KG/M2 | WEIGHT: 160 LBS | HEIGHT: 61 IN | OXYGEN SATURATION: 99 %

## 2018-03-06 DIAGNOSIS — I48.0 PAROXYSMAL ATRIAL FIBRILLATION (HCC): Primary | ICD-10-CM

## 2018-03-06 PROCEDURE — 99213 OFFICE O/P EST LOW 20 MIN: CPT | Performed by: PHYSICIAN ASSISTANT

## 2018-03-06 PROCEDURE — 93000 ELECTROCARDIOGRAM COMPLETE: CPT | Performed by: PHYSICIAN ASSISTANT

## 2018-03-06 RX ORDER — FLECAINIDE ACETATE 50 MG/1
50 TABLET ORAL EVERY 12 HOURS SCHEDULED
Qty: 180 TABLET | Refills: 3 | Status: SHIPPED | OUTPATIENT
Start: 2018-03-06 | End: 2018-03-15 | Stop reason: SDUPTHER

## 2018-03-06 NOTE — PROGRESS NOTES
"  Date of Office Visit: 2018  Encounter Provider: KATHIA Luciano  Place of Service: [facility] CARDIOLOGY  Patient Name: Sera Mccord  :1945    Chief Complaint   Patient presents with   • Atrial Fibrillation     1 week hospital follow up   :     HPI: Sera Mccord is a 72 y.o. female who presents today for follow-up.  Old records have been obtained and reviewed by me.  She is a patient of Dr. Linda's with a past cardiac history significant for chest pain, abnormal stress test, and paroxysmal atrial fibrillation.  Her cardiac catheterization revealed normal coronary arteries, and she was labeled as \"syndrome X\".  She has been anticoagulated with Xarelto for her paroxysmal atrial fibrillation.  She was last in our office to see Dr. Linda on 10/27/2017.  At that visit, she was doing well.  She has had several ER visits for atrial fibrillation, and Dr. Linda recommended starting her on flecainide 25 mg twice a day.  She has been back to the emergency room it looks like 3 times since her last visit in our office, all with atrial fibrillation.  She does go into RVR when she is in atrial fibrillation.   She has been in the emergency room twice in February for atrial fibrillation with rapid ventricular response.  It sounds like both times that she did take an extra 25 mg of flecainide and eventually slowed down.  Both episodes happen in the evening, she feels like most of her atrial fibrillation happens at night.  On her echocardiogram in 2016 she had a normal size left atrium.  She really avoids stimulants and caffeine, and is very compliant with her medications.  She is not having any bleeding issues on the Xarelto.  She is under a lot of stress because they're trying to sell their home.  When she is not in atrial fibrillation, she is asymptomatic.      Past Medical History:   Diagnosis Date   • Abnormal finding on lung imaging    • Arthritis     DJD Multiple " joints spine, shoulders, knees, left hip. December 2013 normal joint examination. Patient has 6 positive fibromyalgia tender points.   • Coronary artery disease     12/14 markedly abnl stress and nl cath; poss syndrome x  felt terrible with metoprolol   • Fibromyalgia    • Foot pain    • Hemorrhoids     internal and external   • Histoplasmosis    • Hyperlipidemia    • Hypertension    • IBS (irritable bowel syndrome)    • Insomnia     affected by burning in legs and also pain in right ear and occipit   • Lung mass    • Mediastinal lymphadenopathy    • Night sweat    • PAF (paroxysmal atrial fibrillation)    • Plantar fasciitis     12/13 right side   • Restless leg syndrome    • Sore throat        Past Surgical History:   Procedure Laterality Date   • CARDIAC CATHETERIZATION  2015   • COLONOSCOPY N/A 10/24/2016    Procedure: COLONOSCOPY;  Surgeon: Lauren Reich MD;  Location: [facility] ENDOSCOPY;  Service:    • COLONOSCOPY W/ POLYPECTOMY  2013    Dr. Lauren Reich   • HYSTERECTOMY     • UPPER GASTROINTESTINAL ENDOSCOPY  11/2015    Dr. Lauren Reich       Social History     Social History   • Marital status:      Spouse name: N/A   • Number of children: N/A   • Years of education: N/A     Occupational History   • Not on file.     Social History Main Topics   • Smoking status: Never Smoker   • Smokeless tobacco: Never Used   • Alcohol use No   • Drug use: No   • Sexual activity: Defer     Other Topics Concern   • Not on file     Social History Narrative       Family History   Problem Relation Age of Onset   • Hypertension Mother    • Heart disease Mother    • Heart disease Father    • Heart attack Father    • Skin cancer Maternal Grandmother    • No Known Problems Maternal Grandfather    • Arthritis Paternal Grandmother    • Heart disease Paternal Grandfather    • Ovarian cancer Maternal Aunt        Review of Systems   Constitution: Negative for chills, fever, malaise/fatigue, weight gain and weight loss.   HENT:  Negative for ear pain, hearing loss, nosebleeds and sore throat.    Eyes: Negative for double vision, pain and visual disturbance.   Cardiovascular: Negative for chest pain, dyspnea on exertion, irregular heartbeat, leg swelling, near-syncope, orthopnea, palpitations, paroxysmal nocturnal dyspnea and syncope.   Respiratory: Negative for cough, shortness of breath, sleep disturbances due to breathing, snoring and wheezing.    Endocrine: Negative for cold intolerance, heat intolerance and polyuria.   Skin: Negative for itching and rash.   Musculoskeletal: Negative for joint pain, joint swelling and myalgias.   Gastrointestinal: Negative for abdominal pain, diarrhea, melena, nausea and vomiting.   Genitourinary: Negative for frequency, hematuria and hesitancy.   Neurological: Negative for excessive daytime sleepiness, headaches, light-headedness, numbness, paresthesias and seizures.   Psychiatric/Behavioral: Negative for altered mental status and depression.   Allergic/Immunologic: Negative.    All other systems reviewed and are negative.      Allergies   Allergen Reactions   • Duloxetine      Leg swelling   • Lisinopril      Leg swelling   • Losartan Myalgia   • Mobic [Meloxicam] Other (See Comments) and GI Intolerance     increased heart rate and elevated blood pressure   • Codeine Nausea And Vomiting   • Levofloxacin Swelling   • Sulfa Antibiotics Nausea And Vomiting         Current Outpatient Prescriptions:   •  amLODIPine (NORVASC) 5 MG tablet, Take 1 tablet by mouth Daily., Disp: 90 tablet, Rfl: 2  •  atorvastatin (LIPITOR) 10 MG tablet, Take 1 tablet by mouth Daily., Disp: 90 tablet, Rfl: 3  •  cyclobenzaprine (FLEXERIL) 5 MG tablet, Take 1 tablet by mouth Every Night. (Patient taking differently: Take 5 mg by mouth Daily As Needed.), Disp: 30 tablet, Rfl: 0  •  flecainide (TAMBOCOR) 50 MG tablet, Take 1 tablet by mouth Every 12 (Twelve) Hours., Disp: 180 tablet, Rfl: 3  •  rivaroxaban (XARELTO) 20 MG tablet,  "Take 1 tablet by mouth Daily With Dinner., Disp: 70 tablet, Rfl: 0     Objective:     Vitals:    03/06/18 1049 03/06/18 1107   BP: 116/68 128/70   BP Location: Right arm Left arm   Pulse: (!) 41    SpO2: 99%    Weight: 72.6 kg (160 lb)    Height: 154.9 cm (61\")      Body mass index is 30.23 kg/(m^2).    PHYSICAL EXAM:    Physical Exam   Constitutional: She is oriented to person, place, and time. She appears well-developed and well-nourished. No distress.   HENT:   Head: Normocephalic and atraumatic.   Eyes: Pupils are equal, round, and reactive to light.   Neck: No JVD present. No thyromegaly present.   Cardiovascular: Normal rate, regular rhythm, normal heart sounds and intact distal pulses.    No murmur heard.  Pulmonary/Chest: Effort normal and breath sounds normal. No respiratory distress.   Abdominal: Soft. Bowel sounds are normal. She exhibits no distension. There is no splenomegaly or hepatomegaly. There is no tenderness.   Musculoskeletal: Normal range of motion. She exhibits no edema.   Neurological: She is alert and oriented to person, place, and time.   Skin: Skin is warm and dry. She is not diaphoretic. No erythema.   Psychiatric: She has a normal mood and affect. Her behavior is normal. Judgment normal.         ECG 12 Lead  Date/Time: 3/6/2018 11:18 AM  Performed by: URMILA PUGH.  Authorized by: URMILA PUGH.   Comparison: compared with previous ECG from 2/28/2018  Rhythm: sinus bradycardia  BPM: 41  Clinical impression: abnormal ECG  Comments: Indication: Paroxysmal atrial fibrillation.              Assessment:       Diagnosis Plan   1. Paroxysmal atrial fibrillation  ECG 12 Lead     Orders Placed This Encounter   Procedures   • ECG 12 Lead     This order was created via procedure documentation          Plan:       1.  Atrial Fibrillation and Atrial Flutter  Assessment  • The patient has paroxysmal atrial fibrillation  • This is non-valvular in etiology  • The patient's CHADS2-VASc score is 2  • " A [id number] score of 2 or more is considered a high risk for a thromboembolic event  • Rivaroxaban prescribed    Plan  • Attempt to maintain sinus rhythm  • Continue rivaroxaban for antithrombotic therapy, bleeding issues discussed  • Continue flecainide for rhythm control    Subjective - Objective  • She is in sinus rhythm today, she sinus bradycardic.  She is anticoagulated with Xarelto.  It seems like both of these episodes happen in the evening.  I did discuss this with Dr. Linda.  I'm going to increase her flecainide to 50 mg twice a day and she will come back on Friday for an ECG.  I also referred her for acupuncture as I have found this is been beneficial and some of my patients with normal left atrial size in the past.  Prior to her to episodes in February, she had not had any atrial fibrillation for quite some time.  I cannot place her on a beta blocker because of her underlying bradycardia.        As always, it has been a pleasure to participate in your patient's care.      Sincerely,         Damaris Morse PA-C

## 2018-03-07 ENCOUNTER — TELEPHONE (OUTPATIENT)
Dept: CARDIOLOGY | Facility: CLINIC | Age: 73
End: 2018-03-07

## 2018-03-07 NOTE — TELEPHONE ENCOUNTER
If she does not want to increase the flecainide that is fine.  She can continue on the same dose and take an extra half a pill if she has an episode of atrial fibrillation.  If she chooses not to increase the dose, she does not need to check an ECG.  Please let her know.

## 2018-03-07 NOTE — TELEPHONE ENCOUNTER
Called s/w patient she is going as she was doing and if she has an episode will take an extra half pill.  She will not be coming in on Fri for the EKG since she is staying the same.  +

## 2018-03-07 NOTE — TELEPHONE ENCOUNTER
Patient said she is scared to increase the flecainide up to 50mg 1 po bid. Afraid this will lower her heart rate  Said she is not in A-fib everyday.  Also not sure about ref. For an acupuncture  She is to come back in for an EKG  [phone]

## 2018-03-15 ENCOUNTER — OFFICE VISIT (OUTPATIENT)
Dept: INTERNAL MEDICINE | Facility: CLINIC | Age: 73
End: 2018-03-15

## 2018-03-15 VITALS
TEMPERATURE: 98.1 F | WEIGHT: 160.4 LBS | HEART RATE: 60 BPM | BODY MASS INDEX: 30.29 KG/M2 | OXYGEN SATURATION: 98 % | HEIGHT: 61 IN | DIASTOLIC BLOOD PRESSURE: 74 MMHG | SYSTOLIC BLOOD PRESSURE: 150 MMHG

## 2018-03-15 DIAGNOSIS — I48.0 PAROXYSMAL ATRIAL FIBRILLATION (HCC): Primary | ICD-10-CM

## 2018-03-15 DIAGNOSIS — R73.9 HYPERGLYCEMIA: ICD-10-CM

## 2018-03-15 DIAGNOSIS — M79.7 FIBROMYALGIA: ICD-10-CM

## 2018-03-15 DIAGNOSIS — E78.2 MIXED HYPERLIPIDEMIA: ICD-10-CM

## 2018-03-15 DIAGNOSIS — I10 ESSENTIAL HYPERTENSION: ICD-10-CM

## 2018-03-15 DIAGNOSIS — E55.9 VITAMIN D DEFICIENCY: ICD-10-CM

## 2018-03-15 LAB
25(OH)D3+25(OH)D2 SERPL-MCNC: 27.6 NG/ML (ref 30–100)
CHOLEST SERPL-MCNC: 179 MG/DL (ref 0–200)
HBA1C MFR BLD: 5.6 % (ref 4.8–5.6)
HDLC SERPL-MCNC: 68 MG/DL (ref 40–60)
LDLC SERPL CALC-MCNC: 92 MG/DL (ref 0–100)
TRIGL SERPL-MCNC: 97 MG/DL (ref 0–150)
VLDLC SERPL CALC-MCNC: 19.4 MG/DL (ref 5–40)

## 2018-03-15 PROCEDURE — 99214 OFFICE O/P EST MOD 30 MIN: CPT | Performed by: FAMILY MEDICINE

## 2018-03-15 RX ORDER — FLECAINIDE ACETATE 50 MG/1
25 TABLET ORAL EVERY 12 HOURS SCHEDULED
Qty: 30 TABLET | Refills: 6
Start: 2018-03-15 | End: 2018-10-31

## 2018-03-15 NOTE — PROGRESS NOTES
"Sera Mccord is a 72 y.o. female.      Assessment/Plan   Problem List Items Addressed This Visit        Cardiovascular and Mediastinum    Mixed hyperlipidemia    Overview     Description: Most recent blood work August 2013         Relevant Orders    Lipid Panel (Completed)    Essential hypertension    Paroxysmal atrial fibrillation - Primary       Digestive    Vitamin D deficiency    Relevant Orders    Vitamin D 25 Hydroxy       Other    Fibromyalgia    Hyperglycemia    Relevant Orders    Hemoglobin A1c (Completed)      Other Visit Diagnoses    None.          Labs stable follow up 3 months or as needed   Continue present management of chronic problems    Chief Complaint   Patient presents with   • follow up to hypertension   • follow up to hyperlipidemia   • follow up to abnormal CBC     Social History   Substance Use Topics   • Smoking status: Never Smoker   • Smokeless tobacco: Never Used   • Alcohol use No       History of Present Illness   CBC slightly elevated HCT BP well controlled as well as lipids she is followed by cardiology for afib and takes Flecanide.    The following portions of the patient's history were reviewed and updated as appropriate:PMHroutine: Social history , Past Medical History, Allergies, Current Medications, Active Problem List, Family History and Health Maintenance    Review of Systems   Constitutional: Negative.    HENT: Negative.    Eyes: Negative.    Respiratory: Negative.    Cardiovascular: Negative.    Gastrointestinal: Negative.    Genitourinary: Negative.    Musculoskeletal: Negative for myalgias.   Neurological: Negative.    Hematological: Negative.    Psychiatric/Behavioral: Negative.        Objective   Vitals:    03/15/18 1027   BP: 150/74   BP Location: Left arm   Patient Position: Sitting   Cuff Size: Large Adult   Pulse: 60   Temp: 98.1 °F (36.7 °C)   TempSrc: Oral   SpO2: 98%   Weight: 72.8 kg (160 lb 6.4 oz)   Height: 154.9 cm (61\")     Body mass index is 30.31 " kg/m².  Physical Exam   Constitutional: She is oriented to person, place, and time. She appears well-developed and well-nourished. No distress.   HENT:   Head: Normocephalic and atraumatic.   Eyes: Conjunctivae and EOM are normal. Pupils are equal, round, and reactive to light. Right eye exhibits no discharge. Left eye exhibits no discharge. No scleral icterus.   Neck: Normal range of motion. Neck supple. No tracheal deviation present. No thyromegaly present.   Cardiovascular: Normal rate, regular rhythm, normal heart sounds, intact distal pulses and normal pulses.  Exam reveals no gallop.    No murmur heard.  Pulmonary/Chest: Effort normal and breath sounds normal. No respiratory distress. She has no wheezes. She has no rales.   Musculoskeletal: Normal range of motion.   Neurological: She is alert and oriented to person, place, and time. She exhibits normal muscle tone. Coordination normal.   Skin: Skin is warm. No rash noted. No erythema. No pallor.   Psychiatric: She has a normal mood and affect. Her behavior is normal. Judgment and thought content normal.   Nursing note and vitals reviewed.    Reviewed Data:  Office Visit on 03/15/2018   Component Date Value Ref Range Status   • Total Cholesterol 03/15/2018 179  0 - 200 mg/dL Final   • Triglycerides 03/15/2018 97  0 - 150 mg/dL Final   • HDL Cholesterol 03/15/2018 68* 40 - 60 mg/dL Final   • VLDL Cholesterol 03/15/2018 19.4  5 - 40 mg/dL Final   • LDL Cholesterol  03/15/2018 92  0 - 100 mg/dL Final   • Hemoglobin A1C 03/15/2018 5.60  4.80 - 5.60 % Final    Comment: Hemoglobin A1C Ranges:  Increased Risk for Diabetes  5.7% to 6.4%  Diabetes                     >= 6.5%  Diabetic Goal                < 7.0%     • 25 Hydroxy, Vitamin D 03/15/2018 27.6* 30.0 - 100.0 ng/ml Final    Comment: Reference Range for Total Vitamin D 25(OH)  Deficiency    <20.0 ng/mL  Insufficiency 21-29 ng/mL  Sufficiency    ng/mL  Toxicity      >100 ng/ml        Admission on  02/28/2018, Discharged on 02/28/2018   Component Date Value Ref Range Status   • Glucose 02/28/2018 184* 65 - 99 mg/dL Final   • BUN 02/28/2018 12  8 - 23 mg/dL Final   • Creatinine 02/28/2018 0.84  0.57 - 1.00 mg/dL Final   • Sodium 02/28/2018 149* 136 - 145 mmol/L Final   • Potassium 02/28/2018 3.6  3.5 - 5.2 mmol/L Final   • Chloride 02/28/2018 107  98 - 107 mmol/L Final   • CO2 02/28/2018 26.7  22.0 - 29.0 mmol/L Final   • Calcium 02/28/2018 9.7  8.6 - 10.5 mg/dL Final   • Total Protein 02/28/2018 7.6  6.0 - 8.5 g/dL Final   • Albumin 02/28/2018 4.50  3.50 - 5.20 g/dL Final   • ALT (SGPT) 02/28/2018 17  1 - 33 U/L Final   • AST (SGOT) 02/28/2018 20  1 - 32 U/L Final   • Alkaline Phosphatase 02/28/2018 77  39 - 117 U/L Final   • Total Bilirubin 02/28/2018 0.4  0.1 - 1.2 mg/dL Final   • eGFR Non  Amer 02/28/2018 67  >60 mL/min/1.73 Final   • Globulin 02/28/2018 3.1  gm/dL Final   • A/G Ratio 02/28/2018 1.5  g/dL Final   • BUN/Creatinine Ratio 02/28/2018 14.3  7.0 - 25.0 Final   • Anion Gap 02/28/2018 15.3  mmol/L Final   • proBNP 02/28/2018 97.5  5.0 - 900.0 pg/mL Final   • Troponin T 02/28/2018 <0.010  0.000 - 0.030 ng/mL Final   • Magnesium 02/28/2018 2.2  1.6 - 2.4 mg/dL Final   • WBC 02/28/2018 7.57  4.50 - 10.70 10*3/mm3 Final   • RBC 02/28/2018 4.97  3.90 - 5.20 10*6/mm3 Final   • Hemoglobin 02/28/2018 15.2  11.9 - 15.5 g/dL Final   • Hematocrit 02/28/2018 45.9* 35.6 - 45.5 % Final   • MCV 02/28/2018 92.4  80.5 - 98.2 fL Final   • MCH 02/28/2018 30.6  26.9 - 32.0 pg Final   • MCHC 02/28/2018 33.1  32.4 - 36.3 g/dL Final   • RDW 02/28/2018 14.2* 11.7 - 13.0 % Final   • RDW-SD 02/28/2018 47.1  37.0 - 54.0 fl Final   • MPV 02/28/2018 11.0  6.0 - 12.0 fL Final   • Platelets 02/28/2018 258  140 - 500 10*3/mm3 Final   • Neutrophil % 02/28/2018 60.9  42.7 - 76.0 % Final   • Lymphocyte % 02/28/2018 27.5  19.6 - 45.3 % Final   • Monocyte % 02/28/2018 8.5  5.0 - 12.0 % Final   • Eosinophil % 02/28/2018 3.0  0.3  - 6.2 % Final   • Basophil % 02/28/2018 0.1  0.0 - 1.5 % Final   • Immature Grans % 02/28/2018 0.0  0.0 - 0.5 % Final   • Neutrophils, Absolute 02/28/2018 4.61  1.90 - 8.10 10*3/mm3 Final   • Lymphocytes, Absolute 02/28/2018 2.08  0.90 - 4.80 10*3/mm3 Final   • Monocytes, Absolute 02/28/2018 0.64  0.20 - 1.20 10*3/mm3 Final   • Eosinophils, Absolute 02/28/2018 0.23  0.00 - 0.70 10*3/mm3 Final   • Basophils, Absolute 02/28/2018 0.01  0.00 - 0.20 10*3/mm3 Final   • Immature Grans, Absolute 02/28/2018 0.00  0.00 - 0.03 10*3/mm3 Final

## 2018-03-16 ENCOUNTER — TELEPHONE (OUTPATIENT)
Dept: INTERNAL MEDICINE | Facility: CLINIC | Age: 73
End: 2018-03-16

## 2018-03-16 DIAGNOSIS — E55.9 VITAMIN D DEFICIENCY: Primary | ICD-10-CM

## 2018-03-16 NOTE — TELEPHONE ENCOUNTER
----- Message from Parmjit Tidwell MD sent at 3/16/2018  7:46 AM EDT -----  Continue present med and had vitamin D 3 take 5000 units daily check vitamin D level in 3 months

## 2018-03-19 ENCOUNTER — RESULTS ENCOUNTER (OUTPATIENT)
Dept: INTERNAL MEDICINE | Facility: CLINIC | Age: 73
End: 2018-03-19

## 2018-03-19 DIAGNOSIS — D75.1 ERYTHROCYTOSIS: ICD-10-CM

## 2018-04-02 RX ORDER — AMLODIPINE BESYLATE 5 MG/1
TABLET ORAL
Qty: 90 TABLET | Refills: 0 | Status: SHIPPED | OUTPATIENT
Start: 2018-04-02 | End: 2018-07-09

## 2018-04-16 ENCOUNTER — RESULTS ENCOUNTER (OUTPATIENT)
Dept: INTERNAL MEDICINE | Facility: CLINIC | Age: 73
End: 2018-04-16

## 2018-04-16 DIAGNOSIS — E55.9 VITAMIN D DEFICIENCY: ICD-10-CM

## 2018-04-18 ENCOUNTER — TELEPHONE (OUTPATIENT)
Dept: CARDIOLOGY | Facility: CLINIC | Age: 73
End: 2018-04-18

## 2018-04-18 DIAGNOSIS — I48.0 PAROXYSMAL ATRIAL FIBRILLATION (HCC): ICD-10-CM

## 2018-04-18 NOTE — TELEPHONE ENCOUNTER
Can you see if this patient needs a prior auth for her Kerwin   Said this is costing her over 289 dollars

## 2018-04-19 ENCOUNTER — TELEPHONE (OUTPATIENT)
Dept: CARDIOLOGY | Facility: CLINIC | Age: 73
End: 2018-04-19

## 2018-04-19 RX ORDER — WARFARIN SODIUM 5 MG/1
5 TABLET ORAL
Qty: 45 TABLET | Refills: 3 | Status: SHIPPED | OUTPATIENT
Start: 2018-04-19 | End: 2018-05-24 | Stop reason: SDDI

## 2018-04-19 NOTE — TELEPHONE ENCOUNTER
I spoke with pharmacy in regards to whether a PA would help lower pts cost of her Xarelto.      Unfortunately pt has to meet a deductible before med will become cheaper. Pt would have to call her ins co to see what her deductible would be

## 2018-04-19 NOTE — TELEPHONE ENCOUNTER
Patient states she cannot afford Xarelto   She states she called her Ins. Company and was told the alternative drugs would cost the same.  What else can she do?

## 2018-04-19 NOTE — TELEPHONE ENCOUNTER
She will have to go on warfarin 5 mg every evening and followed up in our [facility] on Monday

## 2018-04-19 NOTE — TELEPHONE ENCOUNTER
Called left a message per Dr. Linda to stop the xarleto and start warfarin and come into the office for an INR check on Monday. Per Dr. Linda

## 2018-04-20 NOTE — TELEPHONE ENCOUNTER
When I called this patient back to make sure she got are message she said she needs to go to the dermatologist  For a spot on her side that needs to be removed part of this has come off and is black. She is going to that next Wed.  She states they want her off xarleto 3 days prior.   So not sure what she needs to do.

## 2018-04-24 ENCOUNTER — TELEPHONE (OUTPATIENT)
Dept: INTERNAL MEDICINE | Facility: CLINIC | Age: 73
End: 2018-04-24

## 2018-04-24 NOTE — TELEPHONE ENCOUNTER
Patient called stating that about an hour after she takes the amlodipine.  She is depressed, anxious, nervous, and it throws her into a fib.  Please advise.

## 2018-04-25 ENCOUNTER — TELEPHONE (OUTPATIENT)
Dept: INTERNAL MEDICINE | Facility: CLINIC | Age: 73
End: 2018-04-25

## 2018-04-25 NOTE — TELEPHONE ENCOUNTER
Patient called stating that she went to the dermatologist today and had the place cut off and that she is to start taking coumadin (per Dr. Linda).

## 2018-05-10 ENCOUNTER — TRANSCRIBE ORDERS (OUTPATIENT)
Dept: ADMINISTRATIVE | Facility: HOSPITAL | Age: 73
End: 2018-05-10

## 2018-05-10 DIAGNOSIS — Z12.31 VISIT FOR SCREENING MAMMOGRAM: Primary | ICD-10-CM

## 2018-05-24 ENCOUNTER — OFFICE VISIT (OUTPATIENT)
Dept: INTERNAL MEDICINE | Facility: CLINIC | Age: 73
End: 2018-05-24

## 2018-05-24 VITALS
OXYGEN SATURATION: 98 % | RESPIRATION RATE: 16 BRPM | TEMPERATURE: 97.9 F | SYSTOLIC BLOOD PRESSURE: 143 MMHG | HEIGHT: 61 IN | WEIGHT: 161.9 LBS | DIASTOLIC BLOOD PRESSURE: 72 MMHG | BODY MASS INDEX: 30.57 KG/M2 | HEART RATE: 50 BPM

## 2018-05-24 DIAGNOSIS — M79.18 MUSCULOSKELETAL PAIN: Primary | ICD-10-CM

## 2018-05-24 DIAGNOSIS — M54.50 ACUTE RIGHT-SIDED LOW BACK PAIN WITHOUT SCIATICA: ICD-10-CM

## 2018-05-24 LAB
BILIRUB BLD-MCNC: NEGATIVE MG/DL
CLARITY, POC: CLEAR
COLOR UR: YELLOW
GLUCOSE UR STRIP-MCNC: NEGATIVE MG/DL
KETONES UR QL: NEGATIVE
LEUKOCYTE EST, POC: NEGATIVE
NITRITE UR-MCNC: NEGATIVE MG/ML
PH UR: 5.5 [PH] (ref 5–8)
PROT UR STRIP-MCNC: NEGATIVE MG/DL
RBC # UR STRIP: NEGATIVE /UL
SP GR UR: 1.03 (ref 1–1.03)
UROBILINOGEN UR QL: NORMAL

## 2018-05-24 PROCEDURE — 81003 URINALYSIS AUTO W/O SCOPE: CPT | Performed by: FAMILY MEDICINE

## 2018-05-24 PROCEDURE — 99213 OFFICE O/P EST LOW 20 MIN: CPT | Performed by: FAMILY MEDICINE

## 2018-05-24 RX ORDER — ASPIRIN 325 MG
325 TABLET ORAL DAILY
COMMUNITY
End: 2021-01-12

## 2018-05-24 NOTE — PROGRESS NOTES
"Sera Mccord is a 73 y.o. female.      Assessment/Plan   Problem List Items Addressed This Visit        Nervous and Auditory    Musculoskeletal pain - Primary           Trial of Aleve 1 every 12 hours decrease frequency of mowing and duration of mowing follow-up if no improvement or if development of other symptoms of fever and diarrhea worsening abdominal pain      Chief Complaint   Patient presents with   • Abdominal Pain     BLOATING, BURNING, RIGHT LOWER BACK AREA    • Leg Pain     ACHING BILATERAL     Social History   Substance Use Topics   • Smoking status: Never Smoker   • Smokeless tobacco: Never Used   • Alcohol use No       History of Present Illness   Patient appointment for acute right low back pain for the last week or so seems to be related to when she is mowing with  0 turn mower 8 acres.  Not she developed some pain in the right flank with some abdominal swelling no vomiting no diarrhea or constipation no urine symptoms no fever no over-the-counter medications taken to help.    The following portions of the patient's history were reviewed and updated as appropriate:PMHroutine: Social history , Past Medical History, Allergies, Current Medications, Active Problem List and Health Maintenance    Review of Systems   Constitutional: Negative.    Respiratory: Negative.    Cardiovascular: Negative.    Gastrointestinal: Positive for abdominal distention and abdominal pain. Negative for constipation, diarrhea, nausea, rectal pain and vomiting.   Genitourinary: Negative.    Musculoskeletal: Positive for back pain.   Neurological: Negative.    Psychiatric/Behavioral: Negative.        Objective   Vitals:    05/24/18 1023   BP: 143/72   BP Location: Left arm   Patient Position: Sitting   Cuff Size: Adult   Pulse: 50   Resp: 16   Temp: 97.9 °F (36.6 °C)   TempSrc: Oral   SpO2: 98%   Weight: 73.4 kg (161 lb 14.4 oz)   Height: 154.9 cm (61\")     Body mass index is 30.59 kg/m².  Physical Exam   Constitutional: She " appears well-developed and well-nourished.   HENT:   Head: Normocephalic and atraumatic.   Eyes: Conjunctivae are normal. No scleral icterus.   Cardiovascular: Normal rate and regular rhythm.    Abdominal: Soft. Bowel sounds are normal. She exhibits no distension and no mass. There is tenderness. There is no rebound and no guarding. No hernia.   Midaxillary line at the level of L3 some abdominal tenderness no CVA tenderness or kidney tenderness minimal abdominal tenderness in this area no right lower quadrant tenderness or left lower quadrant tenderness no hepatosplenomegaly no epigastric tenderness   Psychiatric: She has a normal mood and affect. Her behavior is normal. Judgment and thought content normal.   Nursing note and vitals reviewed.    Reviewed Data:  No visits with results within 1 Month(s) from this visit.   Latest known visit with results is:   Office Visit on 03/15/2018   Component Date Value Ref Range Status   • Total Cholesterol 03/15/2018 179  0 - 200 mg/dL Final   • Triglycerides 03/15/2018 97  0 - 150 mg/dL Final   • HDL Cholesterol 03/15/2018 68* 40 - 60 mg/dL Final   • VLDL Cholesterol 03/15/2018 19.4  5 - 40 mg/dL Final   • LDL Cholesterol  03/15/2018 92  0 - 100 mg/dL Final   • Hemoglobin A1C 03/15/2018 5.60  4.80 - 5.60 % Final    Comment: Hemoglobin A1C Ranges:  Increased Risk for Diabetes  5.7% to 6.4%  Diabetes                     >= 6.5%  Diabetic Goal                < 7.0%     • 25 Hydroxy, Vitamin D 03/15/2018 27.6* 30.0 - 100.0 ng/ml Final    Comment: Reference Range for Total Vitamin D 25(OH)  Deficiency    <20.0 ng/mL  Insufficiency 21-29 ng/mL  Sufficiency    ng/mL  Toxicity      >100 ng/ml

## 2018-06-15 ENCOUNTER — HOSPITAL ENCOUNTER (OUTPATIENT)
Dept: CT IMAGING | Facility: HOSPITAL | Age: 73
Discharge: HOME | End: 2018-06-15
Admitting: NURSE PRACTITIONER

## 2018-06-15 DIAGNOSIS — R91.1 SOLITARY PULMONARY NODULE: ICD-10-CM

## 2018-06-15 PROCEDURE — 71250 CT THORAX DX C-: CPT

## 2018-06-18 LAB
25(OH)D3+25(OH)D2 SERPL-MCNC: 31.3 NG/ML (ref 30–100)
BASOPHILS # BLD AUTO: 0.01 10*3/MM3 (ref 0–0.2)
BASOPHILS NFR BLD AUTO: 0.2 % (ref 0–1.5)
EOSINOPHIL # BLD AUTO: 0.26 10*3/MM3 (ref 0–0.7)
EOSINOPHIL NFR BLD AUTO: 5.5 % (ref 0.3–6.2)
ERYTHROCYTE [DISTWIDTH] IN BLOOD BY AUTOMATED COUNT: 14.9 % (ref 11.7–13)
HCT VFR BLD AUTO: 44.1 % (ref 35.6–45.5)
HGB BLD-MCNC: 14.1 G/DL (ref 11.9–15.5)
IMM GRANULOCYTES # BLD: 0.01 10*3/MM3 (ref 0–0.03)
IMM GRANULOCYTES NFR BLD: 0.2 % (ref 0–0.5)
LYMPHOCYTES # BLD AUTO: 2.04 10*3/MM3 (ref 0.9–4.8)
LYMPHOCYTES NFR BLD AUTO: 43.3 % (ref 19.6–45.3)
MCH RBC QN AUTO: 30.4 PG (ref 26.9–32)
MCHC RBC AUTO-ENTMCNC: 32 G/DL (ref 32.4–36.3)
MCV RBC AUTO: 95 FL (ref 80.5–98.2)
MONOCYTES # BLD AUTO: 0.33 10*3/MM3 (ref 0.2–1.2)
MONOCYTES NFR BLD AUTO: 7 % (ref 5–12)
NEUTROPHILS # BLD AUTO: 2.07 10*3/MM3 (ref 1.9–8.1)
NEUTROPHILS NFR BLD AUTO: 44 % (ref 42.7–76)
PLATELET # BLD AUTO: 280 10*3/MM3 (ref 140–500)
RBC # BLD AUTO: 4.64 10*6/MM3 (ref 3.9–5.2)
WBC # BLD AUTO: 4.71 10*3/MM3 (ref 4.5–10.7)

## 2018-06-20 ENCOUNTER — TELEPHONE (OUTPATIENT)
Dept: INTERNAL MEDICINE | Facility: CLINIC | Age: 73
End: 2018-06-20

## 2018-07-07 ENCOUNTER — HOSPITAL ENCOUNTER (EMERGENCY)
Facility: HOSPITAL | Age: 73
Discharge: HOME | End: 2018-07-07
Attending: EMERGENCY MEDICINE | Admitting: EMERGENCY MEDICINE

## 2018-07-07 VITALS
SYSTOLIC BLOOD PRESSURE: 122 MMHG | WEIGHT: 155 LBS | OXYGEN SATURATION: 97 % | RESPIRATION RATE: 16 BRPM | DIASTOLIC BLOOD PRESSURE: 59 MMHG | TEMPERATURE: 97.8 F | BODY MASS INDEX: 29.27 KG/M2 | HEART RATE: 54 BPM | HEIGHT: 61 IN

## 2018-07-07 DIAGNOSIS — R00.2 HEART PALPITATIONS: Primary | ICD-10-CM

## 2018-07-07 DIAGNOSIS — I48.0 PAROXYSMAL ATRIAL FIBRILLATION (HCC): ICD-10-CM

## 2018-07-07 LAB
ALBUMIN SERPL-MCNC: 4.6 G/DL (ref 3.5–5.2)
ALBUMIN/GLOB SERPL: 1.8 G/DL
ALP SERPL-CCNC: 78 U/L (ref 39–117)
ALT SERPL W P-5'-P-CCNC: 16 U/L (ref 1–33)
ANION GAP SERPL CALCULATED.3IONS-SCNC: 15.1 MMOL/L
APTT PPP: 24.8 SECONDS (ref 22.7–35.4)
AST SERPL-CCNC: 18 U/L (ref 1–32)
BASOPHILS # BLD AUTO: 0.01 10*3/MM3 (ref 0–0.2)
BASOPHILS NFR BLD AUTO: 0.2 % (ref 0–1.5)
BILIRUB SERPL-MCNC: 0.3 MG/DL (ref 0.1–1.2)
BILIRUB UR QL STRIP: NEGATIVE
BUN BLD-MCNC: 16 MG/DL (ref 8–23)
BUN/CREAT SERPL: 21.3 (ref 7–25)
CALCIUM SPEC-SCNC: 9.2 MG/DL (ref 8.6–10.5)
CHLORIDE SERPL-SCNC: 106 MMOL/L (ref 98–107)
CLARITY UR: CLEAR
CO2 SERPL-SCNC: 23.9 MMOL/L (ref 22–29)
COLOR UR: YELLOW
CREAT BLD-MCNC: 0.75 MG/DL (ref 0.57–1)
DEPRECATED RDW RBC AUTO: 47.8 FL (ref 37–54)
EOSINOPHIL # BLD AUTO: 0.17 10*3/MM3 (ref 0–0.7)
EOSINOPHIL NFR BLD AUTO: 3.2 % (ref 0.3–6.2)
ERYTHROCYTE [DISTWIDTH] IN BLOOD BY AUTOMATED COUNT: 14 % (ref 11.7–13)
GFR SERPL CREATININE-BSD FRML MDRD: 76 ML/MIN/1.73
GLOBULIN UR ELPH-MCNC: 2.6 GM/DL
GLUCOSE BLD-MCNC: 121 MG/DL (ref 65–99)
GLUCOSE UR STRIP-MCNC: NEGATIVE MG/DL
HCT VFR BLD AUTO: 47.3 % (ref 35.6–45.5)
HGB BLD-MCNC: 15.2 G/DL (ref 11.9–15.5)
HGB UR QL STRIP.AUTO: NEGATIVE
IMM GRANULOCYTES # BLD: 0 10*3/MM3 (ref 0–0.03)
IMM GRANULOCYTES NFR BLD: 0 % (ref 0–0.5)
INR PPP: 0.91 (ref 0.9–1.1)
KETONES UR QL STRIP: NEGATIVE
LEUKOCYTE ESTERASE UR QL STRIP.AUTO: NEGATIVE
LYMPHOCYTES # BLD AUTO: 1.45 10*3/MM3 (ref 0.9–4.8)
LYMPHOCYTES NFR BLD AUTO: 27.3 % (ref 19.6–45.3)
MCH RBC QN AUTO: 30.2 PG (ref 26.9–32)
MCHC RBC AUTO-ENTMCNC: 32.1 G/DL (ref 32.4–36.3)
MCV RBC AUTO: 93.8 FL (ref 80.5–98.2)
MONOCYTES # BLD AUTO: 0.33 10*3/MM3 (ref 0.2–1.2)
MONOCYTES NFR BLD AUTO: 6.2 % (ref 5–12)
NEUTROPHILS # BLD AUTO: 3.35 10*3/MM3 (ref 1.9–8.1)
NEUTROPHILS NFR BLD AUTO: 63.1 % (ref 42.7–76)
NITRITE UR QL STRIP: NEGATIVE
PH UR STRIP.AUTO: <=5 [PH] (ref 5–8)
PLATELET # BLD AUTO: 229 10*3/MM3 (ref 140–500)
PMV BLD AUTO: 10.7 FL (ref 6–12)
POTASSIUM BLD-SCNC: 4.3 MMOL/L (ref 3.5–5.2)
PROT SERPL-MCNC: 7.2 G/DL (ref 6–8.5)
PROT UR QL STRIP: NEGATIVE
PROTHROMBIN TIME: 12.1 SECONDS (ref 11.7–14.2)
RBC # BLD AUTO: 5.04 10*6/MM3 (ref 3.9–5.2)
SODIUM BLD-SCNC: 145 MMOL/L (ref 136–145)
SP GR UR STRIP: 1.01 (ref 1–1.03)
TROPONIN T SERPL-MCNC: <0.01 NG/ML (ref 0–0.03)
UROBILINOGEN UR QL STRIP: NORMAL
WBC NRBC COR # BLD: 5.31 10*3/MM3 (ref 4.5–10.7)

## 2018-07-07 PROCEDURE — 85025 COMPLETE CBC W/AUTO DIFF WBC: CPT | Performed by: EMERGENCY MEDICINE

## 2018-07-07 PROCEDURE — 84484 ASSAY OF TROPONIN QUANT: CPT | Performed by: EMERGENCY MEDICINE

## 2018-07-07 PROCEDURE — 85730 THROMBOPLASTIN TIME PARTIAL: CPT | Performed by: EMERGENCY MEDICINE

## 2018-07-07 PROCEDURE — 81003 URINALYSIS AUTO W/O SCOPE: CPT | Performed by: EMERGENCY MEDICINE

## 2018-07-07 PROCEDURE — 85610 PROTHROMBIN TIME: CPT | Performed by: EMERGENCY MEDICINE

## 2018-07-07 PROCEDURE — 93010 ELECTROCARDIOGRAM REPORT: CPT | Performed by: INTERNAL MEDICINE

## 2018-07-07 PROCEDURE — 80053 COMPREHEN METABOLIC PANEL: CPT | Performed by: EMERGENCY MEDICINE

## 2018-07-07 PROCEDURE — 93005 ELECTROCARDIOGRAM TRACING: CPT | Performed by: EMERGENCY MEDICINE

## 2018-07-07 PROCEDURE — 99284 EMERGENCY DEPT VISIT MOD MDM: CPT

## 2018-07-07 RX ORDER — SODIUM CHLORIDE 0.9 % (FLUSH) 0.9 %
10 SYRINGE (ML) INJECTION AS NEEDED
Status: DISCONTINUED | OUTPATIENT
Start: 2018-07-07 | End: 2018-07-07 | Stop reason: HOSPADM

## 2018-07-07 NOTE — ED TRIAGE NOTES
"Pt states \"im in a fib and that started about 10pm.\" pt also reports urinary frequency and feeling \"like im going to pass out\".     Pt reports normal heart rate in the 40s. Pt currently tachycardic 116.     Pt hx a fib.   "

## 2018-07-07 NOTE — ED PROVIDER NOTES
EMERGENCY DEPARTMENT ENCOUNTER    CHIEF COMPLAINT  Chief Complaint: Palpitations   History given by: patient   History limited by: n/a  Room Number: 15/15  PMD: Parmjit Tidwell MD      HPI:  Pt is a 73 y.o. female who presents complaining of an episode of palpitations that occurred tonight at 22:00. Pt describes the palpitations as a rapid HR with skipping beats. Pt states that after the onset of the palpitations, she took an extra half dose of Flecainide as she was instructed by her cardiologist. Pt states that the palpations resolved after about 3 hours. Pt also complains of associated lightheadedness and near syncope. Currently, pt complains of generalized fatigue. Hx of a-fib    Duration:  3 hours   Onset: gradual  Timing: constant   Location: cardiac   Radiation: none  Quality: rapid, skipping beats   Intensity/Severity: moderate  Progression: resolved   Associated Symptoms: lightheadedness, near syncope, fatigue   Aggravating Factors: none  Alleviating Factors: none  Previous Episodes: hx of a-fib  Treatment before arrival: Flecainide     PAST MEDICAL HISTORY  Active Ambulatory Problems     Diagnosis Date Noted   • Arteriosclerosis of coronary artery 03/04/2016   • Arthritis 06/14/2016   • Foot pain 06/14/2016   • Histoplasmosis 06/14/2016   • Mixed hyperlipidemia 06/14/2016   • Cannot sleep 06/14/2016   • Lung mass 06/14/2016   • LAD (lymphadenopathy), mediastinal 06/14/2016   • Plantar fasciitis 06/14/2016   • Restless leg 06/14/2016   • Fibromyalgia 12/13/2016   • Hyperglycemia 12/13/2016   • Allergic rhinitis due to allergen 01/10/2017   • Anxiety 02/13/2017   • Bradycardia 02/24/2017   • Essential hypertension 03/24/2017   • Medicare annual wellness visit, subsequent 05/15/2017   • Paroxysmal atrial fibrillation (CMS/HCC) 10/27/2017   • Erythrocytosis 02/14/2018   • Vitamin D deficiency 03/15/2018   • Musculoskeletal pain 05/24/2018     Resolved Ambulatory Problems     Diagnosis Date Noted   •  Hypokalemia 01/22/2018   • Sinusitis 01/22/2018     Past Medical History:   Diagnosis Date   • Abnormal finding on lung imaging    • Arthritis    • Coronary artery disease    • Fibromyalgia    • Foot pain    • Hemorrhoids    • Histoplasmosis    • Hyperlipidemia    • Hypertension    • IBS (irritable bowel syndrome)    • Insomnia    • Lung mass    • Mediastinal lymphadenopathy    • Night sweat    • PAF (paroxysmal atrial fibrillation) (CMS/HCC)    • Plantar fasciitis    • Restless leg syndrome    • Sore throat        PAST SURGICAL HISTORY  Past Surgical History:   Procedure Laterality Date   • CARDIAC CATHETERIZATION  2015   • COLONOSCOPY N/A 10/24/2016    Procedure: COLONOSCOPY;  Surgeon: Lauren Reich MD;  Location: [facility] ENDOSCOPY;  Service:    • COLONOSCOPY W/ POLYPECTOMY  2013    Dr. Lauren Reich   • HYSTERECTOMY     • UPPER GASTROINTESTINAL ENDOSCOPY  11/2015    Dr. Lauren Reich       FAMILY HISTORY  Family History   Problem Relation Age of Onset   • Hypertension Mother    • Heart disease Mother    • Heart disease Father    • Heart attack Father    • Skin cancer Maternal Grandmother    • No Known Problems Maternal Grandfather    • Arthritis Paternal Grandmother    • Heart disease Paternal Grandfather    • Ovarian cancer Maternal Aunt        SOCIAL HISTORY  Social History     Social History   • Marital status:      Spouse name: N/A   • Number of children: N/A   • Years of education: N/A     Occupational History   • Not on file.     Social History Main Topics   • Smoking status: Never Smoker   • Smokeless tobacco: Never Used   • Alcohol use No   • Drug use: No   • Sexual activity: Defer     Other Topics Concern   • Not on file     Social History Narrative   • No narrative on file       ALLERGIES  Duloxetine; Lisinopril; Losartan; Mobic [meloxicam]; Codeine; Levofloxacin; and Sulfa antibiotics    REVIEW OF SYSTEMS  Review of Systems   Constitutional: Positive for fatigue. Negative for fever.   HENT:  Negative for sore throat.    Eyes: Negative.    Respiratory: Negative for cough and shortness of breath.    Cardiovascular: Positive for palpitations. Negative for chest pain.   Gastrointestinal: Negative for abdominal pain, diarrhea and vomiting.   Genitourinary: Negative for dysuria.   Musculoskeletal: Negative for neck pain.   Skin: Negative for rash.   Allergic/Immunologic: Negative.    Neurological: Positive for syncope (near) and light-headedness. Negative for weakness, numbness and headaches.   Hematological: Negative.    Psychiatric/Behavioral: Negative.    All other systems reviewed and are negative.      PHYSICAL EXAM  ED Triage Vitals [07/07/18 0126]   Temp Heart Rate Resp BP SpO2   97.8 °F (36.6 °C) 116 18 -- 98 %      Temp src Heart Rate Source Patient Position BP Location FiO2 (%)   Tympanic -- -- -- --       Physical Exam   Constitutional: She is oriented to person, place, and time. No distress.   HENT:   Head: Normocephalic and atraumatic.   Eyes: EOM are normal. Pupils are equal, round, and reactive to light.   Neck: Normal range of motion. Neck supple.   Cardiovascular: Normal rate, regular rhythm and normal heart sounds.    Pulmonary/Chest: Effort normal and breath sounds normal. No respiratory distress.   Abdominal: Soft. There is no tenderness. There is no rebound and no guarding.   Musculoskeletal: Normal range of motion. She exhibits no edema.   Neurological: She is alert and oriented to person, place, and time. She has normal sensation and normal strength.   Skin: Skin is warm and dry. No rash noted.   Psychiatric: Mood and affect normal.   Nursing note and vitals reviewed.      LAB RESULTS  Lab Results (last 24 hours)     Procedure Component Value Units Date/Time    CBC & Differential [[id number]] Collected:  07/07/18 0232    Specimen:  Blood Updated:  07/07/18 0245    Narrative:       The following orders were created for panel order CBC & Differential.  Procedure                                Abnormality         Status                     ---------                               -----------         ------                     CBC Auto Differential[[id number]]        Abnormal            Final result                 Please view results for these tests on the individual orders.    Comprehensive Metabolic Panel [[id number]]  (Abnormal) Collected:  07/07/18 0232    Specimen:  Blood Updated:  07/07/18 0308     Glucose 121 (H) mg/dL      BUN 16 mg/dL      Creatinine 0.75 mg/dL      Sodium 145 mmol/L      Potassium 4.3 mmol/L      Chloride 106 mmol/L      CO2 23.9 mmol/L      Calcium 9.2 mg/dL      Total Protein 7.2 g/dL      Albumin 4.60 g/dL      ALT (SGPT) 16 U/L      AST (SGOT) 18 U/L      Alkaline Phosphatase 78 U/L      Total Bilirubin 0.3 mg/dL      eGFR Non African Amer 76 mL/min/1.73      Globulin 2.6 gm/dL      A/G Ratio 1.8 g/dL      BUN/Creatinine Ratio 21.3     Anion Gap 15.1 mmol/L     Narrative:       The MDRD GFR formula is only valid for adults with stable renal function between ages 18 and 70.    Troponin [[id number]]  (Normal) Collected:  07/07/18 0232    Specimen:  Blood Updated:  07/07/18 0308     Troponin T <0.010 ng/mL     Narrative:       Troponin T Reference Ranges:  Less than 0.03 ng/mL:    Negative for AMI  0.03 to 0.09 ng/mL:      Indeterminant for AMI  Greater than 0.09 ng/mL: Positive for AMI    aPTT [[id number]]  (Normal) Collected:  07/07/18 0232    Specimen:  Blood Updated:  07/07/18 0255     PTT 24.8 seconds     Protime-INR [[id number]]  (Normal) Collected:  07/07/18 0232    Specimen:  Blood Updated:  07/07/18 0255     Protime 12.1 Seconds      INR 0.91    CBC Auto Differential [[id number]]  (Abnormal) Collected:  07/07/18 0232    Specimen:  Blood Updated:  07/07/18 0245     WBC 5.31 10*3/mm3      RBC 5.04 10*6/mm3      Hemoglobin 15.2 g/dL      Hematocrit 47.3 (H) %      MCV 93.8 fL      MCH 30.2 pg      MCHC 32.1 (L) g/dL      RDW 14.0 (H) %      RDW-SD 47.8 fl      MPV 10.7 fL       Platelets 229 10*3/mm3      Neutrophil % 63.1 %      Lymphocyte % 27.3 %      Monocyte % 6.2 %      Eosinophil % 3.2 %      Basophil % 0.2 %      Immature Grans % 0.0 %      Neutrophils, Absolute 3.35 10*3/mm3      Lymphocytes, Absolute 1.45 10*3/mm3      Monocytes, Absolute 0.33 10*3/mm3      Eosinophils, Absolute 0.17 10*3/mm3      Basophils, Absolute 0.01 10*3/mm3      Immature Grans, Absolute 0.00 10*3/mm3     Urinalysis With Microscopic If Indicated (No Culture) - Urine, Clean Catch [[id number]]  (Normal) Collected:  07/07/18 0300    Specimen:  Urine from Urine, Clean Catch Updated:  07/07/18 0312     Color, UA Yellow     Appearance, UA Clear     pH, UA <=5.0     Specific Gravity, UA 1.012     Glucose, UA Negative     Ketones, UA Negative     Bilirubin, UA Negative     Blood, UA Negative     Protein, UA Negative     Leuk Esterase, UA Negative     Nitrite, UA Negative     Urobilinogen, UA 0.2 E.U./dL    Narrative:       Urine microscopic not indicated.          I ordered the above labs and reviewed the results    PROCEDURES  Procedures    EKG           EKG time: 01:32  Rhythm/Rate: NSR 94  P waves and MD: nml  QRS, axis: nml   ST and T waves: nml     Interpreted Contemporaneously by me, independently viewed  unchanged compared to prior 2/2018    PROGRESS AND CONSULTS      01;44  CMP, CBC, troponin, PT/INR, UA, and EKG ordered.    01:51  HR- 116 Temp- 97.8 °F (36.6 °C) (Tympanic) O2 sat- 98%  Advised pt of the plan for labs and EKG. Pt understands and agrees with the plan, all questions answered.    04:24  BP- 128/62 HR- (!) 49 Temp- 97.8 °F (36.6 °C) (Tympanic) O2 sat- 98%  Rechecked the patient who is in NAD and is resting comfortably. Advised pt that the workup in the ED shows NAD. HR has remained stable in the ED, pt states that her baseline HR is in the 40's-50's. Informed pt of the plan for discharge. Pt understands and agrees with the plan, all questions answered.    MEDICAL DECISION MAKING  Results were  reviewed/discussed with the patient and they were also made aware of online access. Pt also made aware that some labs, such as cultures, will not be resulted during ER visit and follow up with PMD is necessary.     MDM  Number of Diagnoses or Management Options  Heart palpitations:   Paroxysmal atrial fibrillation (CMS/HCC):      Amount and/or Complexity of Data Reviewed  Clinical lab tests: ordered and reviewed (Troponin is <0.010)  Tests in the medicine section of CPT®: ordered and reviewed (See EKG procedure note. )  Decide to obtain previous medical records or to obtain history from someone other than the patient: yes  Review and summarize past medical records: yes (Pt was last seen in the ED on 2/28/18 s/t a-fib. Pt was d/c home to f/u with her cardiologist at that time. )    Patient Progress  Patient progress: stable          DIAGNOSIS  Final diagnoses:   Heart palpitations   Paroxysmal atrial fibrillation (CMS/HCC)       DISPOSITION  DISCHARGE    Patient discharged in stable condition.    Reviewed implications of results, diagnosis, meds, responsibility to follow up, warning signs and symptoms of possible worsening, potential complications and reasons to return to ER.    Patient/Family voiced understanding of above instructions.    Discussed plan for discharge, as there is no emergent indication for admission. Patient referred to primary care provider for BP management due to today's BP. Pt/family is agreeable and understands need for follow up and repeat testing.  Pt is aware that discharge does not mean that nothing is wrong but it indicates no emergency is present that requires admission and they must continue care with follow-up as given below or physician of their choice.     FOLLOW-UP  Mango Linda MD  [address] [id number] [phone]    Schedule an appointment as soon as possible for a visit            Medication List      Changed    cyclobenzaprine 5 MG tablet  Commonly  known as:  FLEXERIL  Take 1 tablet by mouth Every Night.  What changed:  when to take this  reasons to take this          Latest Documented Vital Signs:  As of 6:38 AM  BP- 122/59 HR- 54 Temp- 97.8 °F (36.6 °C) (Tympanic) O2 sat- 97%    --  Documentation assistance provided by lars Mccord for Dr James.  Information recorded by the lars was done at my direction and has been verified and validated by me.        Racquel Mccord  07/07/18 [id number]       George James MD  07/07/18 [id number]

## 2018-07-09 ENCOUNTER — TELEPHONE (OUTPATIENT)
Dept: CARDIOLOGY | Facility: CLINIC | Age: 73
End: 2018-07-09

## 2018-07-09 ENCOUNTER — OFFICE VISIT (OUTPATIENT)
Dept: CARDIOLOGY | Facility: CLINIC | Age: 73
End: 2018-07-09

## 2018-07-09 ENCOUNTER — TELEPHONE (OUTPATIENT)
Dept: INTERNAL MEDICINE | Facility: CLINIC | Age: 73
End: 2018-07-09

## 2018-07-09 VITALS
HEART RATE: 47 BPM | HEIGHT: 60 IN | BODY MASS INDEX: 30.59 KG/M2 | DIASTOLIC BLOOD PRESSURE: 86 MMHG | SYSTOLIC BLOOD PRESSURE: 182 MMHG | WEIGHT: 155.8 LBS

## 2018-07-09 DIAGNOSIS — I48.0 PAROXYSMAL ATRIAL FIBRILLATION (HCC): ICD-10-CM

## 2018-07-09 DIAGNOSIS — I10 ESSENTIAL HYPERTENSION: Primary | ICD-10-CM

## 2018-07-09 PROCEDURE — 93000 ELECTROCARDIOGRAM COMPLETE: CPT | Performed by: INTERNAL MEDICINE

## 2018-07-09 PROCEDURE — 99214 OFFICE O/P EST MOD 30 MIN: CPT | Performed by: INTERNAL MEDICINE

## 2018-07-09 RX ORDER — AMLODIPINE BESYLATE 2.5 MG/1
2.5 TABLET ORAL DAILY
COMMUNITY
End: 2018-07-09

## 2018-07-09 RX ORDER — LOSARTAN POTASSIUM 25 MG/1
50 TABLET ORAL DAILY
Qty: 90 TABLET | Refills: 3 | Status: SHIPPED | OUTPATIENT
Start: 2018-07-09 | End: 2018-10-31

## 2018-07-09 NOTE — TELEPHONE ENCOUNTER
[phone]    Pt called stating she went to the ED on 7/7/18 for Afib. She states yesterday she was fine and today she is not well.    She states she woke up feeling like her head was being squeezed and cloudy w ears feeling full.  She does have some dizziness and she feels like she has a flush through her arms into her chest.  She denies SOA.    /84  HR 39    She is taking all meds on her list as prescribed with the exception of flexeril and atorvastatin-she is no longer taking these.     Can you advise?    Thanks, C GIOVANA

## 2018-07-09 NOTE — TELEPHONE ENCOUNTER
Patient called stating that she went to the ER on Saturday for palpations.  Her blood pressure is 180/? And heart rate was 39 this morning - her heart rate now is up to 43.  Patient stated that she feels weird in the head.  Patient wanted to know if she should call her cardiologist.  She was notified that she needed to go to the ER and call her cardiologist per Dr. Tidwell.  Patient said that she would do this.

## 2018-07-09 NOTE — TELEPHONE ENCOUNTER
Pt notified and voiced understanding.  She states she will be here in about a half hour.  [facility] GIOVANA

## 2018-07-09 NOTE — PROGRESS NOTES
Date of Office Visit: 2018  Encounter Provider: Mango Linda MD  Place of Service: [facility] CARDIOLOGY  Patient Name: Sera Mccord  :1945  [id number]    Chief Complaint   Patient presents with   • Dizziness   :     HPI: Sera Mccord is a 73 y.o. female  She was just in the hospital in the emergency room yesterday with atrial fibrillation and hypertension.  She has had paroxysmal atrial fibrillation.  We have managed her with flecainide.  She has a relative bradycardia that has been asymptomatic and she has only been on 25 mg of flecainide.  We had increased it to 50 mg but she was not clear about that and was worried that it might slow her heart rate down so she never increased it to 50 mg twice a day.  She can always tell when she goes into atrial fibrillation because she has to micturate and defecate.  She was in atrial fibrillation for a brief period of time and then it went away.  She had been having some anxiety and it was felt that it might be due to her Norvasc so the Norvasc medicine was cut in half.  In the emergency room, she was noted to be pretty hypertensive and she continues to have a fullness in her head and some headache.  She has no visual change, no chest pain, and no shortness of breath, paroxysmal nocturnal dyspnea, orthopnea, or edema.          Past Medical History:   Diagnosis Date   • Abnormal finding on lung imaging    • Arthritis     DJD Multiple joints spine, shoulders, knees, left hip. 2013 normal joint examination. Patient has 6 positive fibromyalgia tender points.   • Coronary artery disease      markedly abnl stress and nl cath; poss syndrome x  felt terrible with metoprolol   • Fibromyalgia    • Foot pain    • Hemorrhoids     internal and external   • Histoplasmosis    • Hyperlipidemia    • Hypertension    • IBS (irritable bowel syndrome)    • Insomnia     affected by burning in legs and also pain in right ear and  occipit   • Lung mass    • Mediastinal lymphadenopathy    • Night sweat    • PAF (paroxysmal atrial fibrillation) (CMS/HCC)    • Plantar fasciitis     12/13 right side   • Restless leg syndrome    • Sore throat        Past Surgical History:   Procedure Laterality Date   • CARDIAC CATHETERIZATION  2015   • COLONOSCOPY N/A 10/24/2016    Procedure: COLONOSCOPY;  Surgeon: Lauren Reich MD;  Location: [facility] ENDOSCOPY;  Service:    • COLONOSCOPY W/ POLYPECTOMY  2013    Dr. Lauren Reich   • HYSTERECTOMY     • UPPER GASTROINTESTINAL ENDOSCOPY  11/2015    Dr. Lauren Reich       Social History     Social History   • Marital status:      Spouse name: N/A   • Number of children: N/A   • Years of education: N/A     Occupational History   • Not on file.     Social History Main Topics   • Smoking status: Never Smoker   • Smokeless tobacco: Never Used   • Alcohol use No   • Drug use: No   • Sexual activity: Defer     Other Topics Concern   • Not on file     Social History Narrative   • No narrative on file       Family History   Problem Relation Age of Onset   • Hypertension Mother    • Heart disease Mother    • Heart disease Father    • Heart attack Father    • Skin cancer Maternal Grandmother    • No Known Problems Maternal Grandfather    • Arthritis Paternal Grandmother    • Heart disease Paternal Grandfather    • Ovarian cancer Maternal Aunt        ROS    Allergies   Allergen Reactions   • Duloxetine      Leg swelling   • Lisinopril      Leg swelling   • Losartan Myalgia   • Mobic [Meloxicam] Other (See Comments) and GI Intolerance     increased heart rate and elevated blood pressure   • Codeine Nausea And Vomiting   • Levofloxacin Swelling   • Sulfa Antibiotics Nausea And Vomiting         Current Outpatient Prescriptions:   •  amLODIPine (NORVASC) 2.5 MG tablet, Take 2.5 mg by mouth Daily., Disp: , Rfl:   •  aspirin 325 MG tablet, Take 325 mg by mouth Daily., Disp: , Rfl:   •  cyclobenzaprine (FLEXERIL) 5 MG tablet,  "Take 1 tablet by mouth Every Night. (Patient taking differently: Take 5 mg by mouth Daily As Needed.), Disp: 30 tablet, Rfl: 0  •  flecainide (TAMBOCOR) 50 MG tablet, Take 0.5 tablets by mouth Every 12 (Twelve) Hours., Disp: 30 tablet, Rfl: 6      Objective:     Vitals:    07/09/18 1141   BP: (!) 182/86   Pulse: (!) 47   Weight: 70.7 kg (155 lb 12.8 oz)   Height: 152.4 cm (60\")     Body mass index is 30.43 kg/m².    Physical Exam   Constitutional: She is oriented to person, place, and time. She appears well-developed and well-nourished.   HENT:   Head: Normocephalic.   Eyes: No scleral icterus.   Neck: No JVD present. No thyromegaly present.   Cardiovascular: Normal rate, regular rhythm and normal heart sounds.  Exam reveals no gallop and no friction rub.    No murmur heard.  Pulmonary/Chest: Effort normal and breath sounds normal. She has no wheezes. She has no rales.   Abdominal: Soft. There is no hepatosplenomegaly. There is no tenderness.   Musculoskeletal: Normal range of motion. She exhibits no edema.   Lymphadenopathy:     She has no cervical adenopathy.   Neurological: She is alert and oriented to person, place, and time.   Skin: Skin is warm and dry. No rash noted.   Psychiatric: She has a normal mood and affect.         ECG 12 Lead  Date/Time: 7/9/2018 12:26 PM  Performed by: MULUGETA JOHNSON  Authorized by: MULUGETA JOHNSON   Comparison: compared with previous ECG   Similar to previous ECG  Rhythm: sinus bradycardia  Conduction: non-specific intraventricular conduction delay  Clinical impression: abnormal ECG             Assessment:       Diagnosis Plan   1. Essential hypertension     2. Paroxysmal atrial fibrillation (CMS/HCC)            Plan:       I think we have two things going on here.  One is hypertension that is not well controlled.  She may be having a side effect from the amlodipine.  I am just going to stop it altogether, and I would like to put her on losartan.  She had some edema with " lisinopril but not angioedema.  I am going to have her come back and get her blood pressure checked on Friday.  I am going to put her on 50 mg a day.  If that is not adequate, we will go to 100 mg.  I am going to have her increase her flecainide to 50 mg twice a day and see how she tolerates that.  If we keep having paroxysmal atrial fibrillation then we probably are going to have to have her see Dr. Griffith.         As always, it has been a pleasure to participate in your patient's care.      Sincerely,       Mango Linda MD

## 2018-07-13 ENCOUNTER — CLINICAL SUPPORT (OUTPATIENT)
Dept: CARDIOLOGY | Facility: CLINIC | Age: 73
End: 2018-07-13

## 2018-07-13 VITALS — DIASTOLIC BLOOD PRESSURE: 82 MMHG | HEART RATE: 38 BPM | SYSTOLIC BLOOD PRESSURE: 164 MMHG

## 2018-07-13 NOTE — PROGRESS NOTES
07/13/18   10:46 AM  Sera Mccord  1945      Sera Mccord is here today for BP check only. Pt was seen by Dr Linda last 7/9/18 and Dr Linda start her on Losartan 50 mg a day and increase her flecainide to 50 mg twice a day. BP reading today 164/82 HR:38.    Per Dr Linda pt need to remain on her current regimen and come back in a week for BP check and bring her BP machine with her for comparison. Informed pt and verbally understood. Pt is scheduled to come on 7/20/18 for BP check......MT

## 2018-07-20 ENCOUNTER — CLINICAL SUPPORT (OUTPATIENT)
Dept: CARDIOLOGY | Facility: CLINIC | Age: 73
End: 2018-07-20

## 2018-07-20 ENCOUNTER — TELEPHONE (OUTPATIENT)
Dept: SURGERY | Facility: CLINIC | Age: 73
End: 2018-07-20

## 2018-07-20 DIAGNOSIS — R91.1 SOLITARY PULMONARY NODULE: Primary | ICD-10-CM

## 2018-07-20 NOTE — TELEPHONE ENCOUNTER
----- Message from Santi Sánchez III, MD sent at 7/18/2018  5:02 PM EDT -----  CT scan of the chest remain stable.  Have her return to the office in one year with CT of the chest without contrast to see me  ----- Message -----  From: Apple Kenyon MA  Sent: 7/18/2018   2:28 PM  To: Santi Sánchez III, MD    Ct patient. Please advise. Thank you!

## 2018-07-23 ENCOUNTER — OFFICE VISIT (OUTPATIENT)
Dept: INTERNAL MEDICINE | Facility: CLINIC | Age: 73
End: 2018-07-23

## 2018-07-23 VITALS
BODY MASS INDEX: 31.06 KG/M2 | WEIGHT: 158.2 LBS | SYSTOLIC BLOOD PRESSURE: 132 MMHG | HEART RATE: 44 BPM | OXYGEN SATURATION: 98 % | DIASTOLIC BLOOD PRESSURE: 80 MMHG | TEMPERATURE: 98.2 F | HEIGHT: 60 IN

## 2018-07-23 DIAGNOSIS — M79.7 FIBROMYALGIA: ICD-10-CM

## 2018-07-23 DIAGNOSIS — Z00.00 MEDICARE ANNUAL WELLNESS VISIT, SUBSEQUENT: Primary | ICD-10-CM

## 2018-07-23 DIAGNOSIS — M79.18 MUSCULOSKELETAL PAIN: ICD-10-CM

## 2018-07-23 DIAGNOSIS — E78.2 MIXED HYPERLIPIDEMIA: ICD-10-CM

## 2018-07-23 DIAGNOSIS — J30.89 CHRONIC ALLERGIC RHINITIS DUE TO OTHER ALLERGIC TRIGGER, UNSPECIFIED SEASONALITY: ICD-10-CM

## 2018-07-23 DIAGNOSIS — I10 ESSENTIAL HYPERTENSION: ICD-10-CM

## 2018-07-23 PROBLEM — M85.88 MASS OF HARD PALATE: Status: ACTIVE | Noted: 2018-07-23

## 2018-07-23 LAB
CHOLEST SERPL-MCNC: 272 MG/DL (ref 0–200)
HDLC SERPL-MCNC: 72 MG/DL (ref 40–60)
LDLC SERPL CALC-MCNC: 161 MG/DL (ref 0–100)
TRIGL SERPL-MCNC: 196 MG/DL (ref 0–150)
VLDLC SERPL CALC-MCNC: 39.2 MG/DL (ref 5–40)

## 2018-07-23 PROCEDURE — 99214 OFFICE O/P EST MOD 30 MIN: CPT | Performed by: FAMILY MEDICINE

## 2018-07-23 PROCEDURE — G0439 PPPS, SUBSEQ VISIT: HCPCS | Performed by: FAMILY MEDICINE

## 2018-07-23 NOTE — PROGRESS NOTES
QUICK REFERENCE INFORMATION:  The ABCs of the Annual Wellness Visit    Subsequent Medicare Wellness Visit    HEALTH RISK ASSESSMENT    1945    Recent Hospitalizations:  No hospitalization(s) within the last year..        Current Medical Providers:  Patient Care Team:  Parmjit Tidwell MD as PCP - General (Family Medicine)  Parmjit Tidwell MD as PCP - Claims Attributed  Cherrie Myles RN as Care Coordinator (Population Health)        Smoking Status:  History   Smoking Status   • Never Smoker   Smokeless Tobacco   • Never Used       Alcohol Consumption:  History   Alcohol Use No       Depression Screen:   PHQ-2/PHQ-9 Depression Screening 7/23/2018   Little interest or pleasure in doing things 0   Feeling down, depressed, or hopeless 0   Trouble falling or staying asleep, or sleeping too much -   Feeling tired or having little energy -   Poor appetite or overeating -   Feeling bad about yourself - or that you are a failure or have let yourself or your family down -   Trouble concentrating on things, such as reading the newspaper or watching television -   Moving or speaking so slowly that other people could have noticed. Or the opposite - being so fidgety or restless that you have been moving around a lot more than usual -   Thoughts that you would be better off dead, or of hurting yourself in some way -   Total Score 0   If you checked off any problems, how difficult have these problems made it for you to do your work, take care of things at home, or get along with other people? -       Health Habits and Functional and Cognitive Screening:  Functional & Cognitive Status 7/23/2018   Do you have difficulty preparing food and eating? No   Do you have difficulty bathing yourself, getting dressed or grooming yourself? No   Do you have difficulty using the toilet? No   Do you have difficulty moving around from place to place? No   Do you have trouble with steps or getting out of a bed or a chair? No   In the past year  have you fallen or experienced a near fall? No   Current Diet Well Balanced Diet   Dental Exam Up to date   Eye Exam Up to date   Exercise (times per week) 7 times per week   Current Exercise Activities Include Yard Work   Do you need help using the phone?  No   Are you deaf or do you have serious difficulty hearing?  No   Do you need help with transportation? No   Do you need help shopping? No   Do you need help preparing meals?  No   Do you need help with housework?  No   Do you need help with laundry? No   Do you need help taking your medications? No   Do you need help managing money? No   Do you ever drive or ride in a car without wearing a seat belt? No   Have you felt unusual stress, anger or loneliness in the last month? No   Who do you live with? Spouse   If you need help, do you have trouble finding someone available to you? No   Have you been bothered in the last four weeks by sexual problems? No   Do you have difficulty concentrating, remembering or making decisions? No           Does the patient have evidence of cognitive impairment? No    Aspirin use counseling: Taking ASA appropriately as indicated      Recent Lab Results:  CMP:  Lab Results   Component Value Date    GLU 86 01/22/2018    BUN 16 07/07/2018    CREATININE 0.75 07/07/2018    EGFRIFNONA 76 07/07/2018    EGFRIFAFRI 87 01/22/2018    BCR 21.3 07/07/2018     07/07/2018    K 4.3 07/07/2018    CO2 23.9 07/07/2018    CALCIUM 9.2 07/07/2018    ALBUMIN 4.60 07/07/2018    BILITOT 0.3 07/07/2018    ALKPHOS 78 07/07/2018    AST 18 07/07/2018    ALT 16 07/07/2018     Lipid Panel:  Lab Results   Component Value Date    TRIG 97 03/15/2018    HDL 68 (H) 03/15/2018    VLDL 19.4 03/15/2018     HbA1c:  Lab Results   Component Value Date    HGBA1C 5.60 03/15/2018       Visual Acuity:  No exam data present    Age-appropriate Screening Schedule:  Refer to the list below for future screening recommendations based on patient's age, sex and/or medical  conditions. Orders for these recommended tests are listed in the plan section. The patient has been provided with a written plan.    Health Maintenance   Topic Date Due   • INFLUENZA VACCINE  08/01/2018   • LIPID PANEL  03/15/2019   • MAMMOGRAM  09/07/2019   • COLONOSCOPY  10/24/2026   • PNEUMOCOCCAL VACCINES (65+ LOW/MEDIUM RISK)  Excluded   • TDAP/TD VACCINES  Excluded   • ZOSTER VACCINE  Excluded        Subjective   History of Present Illness    Sera Mccord is a 73 y.o. female who presents for an Subsequent Wellness Visit.    The following portions of the patient's history were reviewed and updated as appropriate: allergies, current medications, past family history, past medical history, past social history, past surgical history and problem list.    Outpatient Medications Prior to Visit   Medication Sig Dispense Refill   • aspirin 325 MG tablet Take 325 mg by mouth Daily.     • cyclobenzaprine (FLEXERIL) 5 MG tablet Take 1 tablet by mouth Every Night. (Patient taking differently: Take 5 mg by mouth Daily As Needed.) 30 tablet 0   • flecainide (TAMBOCOR) 50 MG tablet Take 0.5 tablets by mouth Every 12 (Twelve) Hours. (Patient taking differently: Take 50 mg by mouth Every 12 (Twelve) Hours.) 30 tablet 6   • losartan (COZAAR) 25 MG tablet Take 2 tablets by mouth Daily. 90 tablet 3     No facility-administered medications prior to visit.        Patient Active Problem List   Diagnosis   • Arteriosclerosis of coronary artery   • Arthritis   • Foot pain   • Histoplasmosis   • Mixed hyperlipidemia   • Cannot sleep   • Lung mass   • LAD (lymphadenopathy), mediastinal   • Plantar fasciitis   • Restless leg   • Fibromyalgia   • Hyperglycemia   • Allergic rhinitis due to allergen   • Anxiety   • Bradycardia   • Essential hypertension   • Medicare annual wellness visit, subsequent   • Paroxysmal atrial fibrillation (CMS/HCC)   • Erythrocytosis   • Vitamin D deficiency       Advance Care Planning:  has NO advance directive  "- information provided to the patient today    Identification of Risk Factors:  Risk factors include: weight  and cardiovascular risk.    Review of Systems    Compared to one year ago, the patient feels her physical health is the same.  Compared to one year ago, the patient feels her mental health is the same.    Objective     Physical Exam    Vitals:    07/23/18 0922   BP: 132/80   BP Location: Right arm   Patient Position: Sitting   Cuff Size: Large Adult   Pulse: (!) 44   Temp: 98.2 °F (36.8 °C)   TempSrc: Oral   SpO2: 98%   Weight: 71.8 kg (158 lb 3.2 oz)   Height: 152.4 cm (60\")   PainSc:   4       Patient's Body mass index is 30.9 kg/m². BMI is above normal parameters. Recommendations include: nutrition counseling.      Assessment/Plan   Patient Self-Management and Personalized Health Advice  The patient has been provided with information about: diet, exercise, weight management and prevention of cardiac or vascular disease and preventive services including:   · Advance directive.    Visit Diagnoses:    ICD-10-CM ICD-9-CM   1. Medicare annual wellness visit, subsequent Z00.00 V70.0   2. Musculoskeletal pain M79.1 729.1   3. Essential hypertension I10 401.9       No orders of the defined types were placed in this encounter.      Outpatient Encounter Prescriptions as of 7/23/2018   Medication Sig Dispense Refill   • aspirin 325 MG tablet Take 325 mg by mouth Daily.     • cyclobenzaprine (FLEXERIL) 5 MG tablet Take 1 tablet by mouth Every Night. (Patient taking differently: Take 5 mg by mouth Daily As Needed.) 30 tablet 0   • flecainide (TAMBOCOR) 50 MG tablet Take 0.5 tablets by mouth Every 12 (Twelve) Hours. (Patient taking differently: Take 50 mg by mouth Every 12 (Twelve) Hours.) 30 tablet 6   • losartan (COZAAR) 25 MG tablet Take 2 tablets by mouth Daily. 90 tablet 3     No facility-administered encounter medications on file as of 7/23/2018.        Reviewed use of high risk medication in the elderly: " yes  Reviewed for potential of harmful drug interactions in the elderly: yes    Follow Up:  Chronic problems     An After Visit Summary and PPPS with all of these plans were given to the patient.

## 2018-07-23 NOTE — PROGRESS NOTES
Sera Mccord is a 73 y.o. female.      Assessment/Plan   Problem List Items Addressed This Visit        Cardiovascular and Mediastinum    Mixed hyperlipidemia    Overview     Description: Most recent blood work August 2013         Relevant Orders    Lipid Panel    Essential hypertension       Respiratory    Allergic rhinitis due to allergen       Nervous and Auditory    RESOLVED: Musculoskeletal pain       Other    Fibromyalgia    Medicare annual wellness visit, subsequent - Primary           Monitor blood pressure goals less than 135/85 continue Flonase for allergic rhinitis recommend against using flexeril inn   light of arrhythmias follow-up results of lipid panel since she stopped statin therapy about 6 months ago  And a lot of her muscle aches and pains have improved off statin.  Follow-up otherwise as needed or in 6 months    Chief Complaint   Patient presents with   • follow up to musculosketetal pain   • Subsequent Medicare Wellness     Social History   Substance Use Topics   • Smoking status: Never Smoker   • Smokeless tobacco: Never Used   • Alcohol use No       History of Present Illness   Patient follow-up chronic problems of hypertension low back pain chronic allergies fibromyalgia hyperlipidemia things have been doing fairly well she has decreased low back pain is using intermittent antihistamine for allergies does not taken any type of anti-inflammatory or SSRI for the [place] she feels fairly stable she does want to add anything at this time  The following portions of the patient's history were reviewed and updated as appropriate:PMHroutine: Social history , Past Medical History, Allergies, Current Medications, Active Problem List and Health Maintenance    Review of Systems   Constitutional: Negative.    HENT: Negative.    Eyes: Negative.    Respiratory: Negative.    Cardiovascular: Negative.    Gastrointestinal: Negative.    Genitourinary: Negative.    Musculoskeletal: Negative for myalgias.  "  Neurological: Negative.    Hematological: Negative.    Psychiatric/Behavioral: Negative.        Objective   Vitals:    07/23/18 0922   BP: 132/80   BP Location: Right arm   Patient Position: Sitting   Cuff Size: Large Adult   Pulse: (!) 44   Temp: 98.2 °F (36.8 °C)   TempSrc: Oral   SpO2: 98%   Weight: 71.8 kg (158 lb 3.2 oz)   Height: 152.4 cm (60\")     Body mass index is 30.9 kg/m².  Physical Exam   Constitutional: She is oriented to person, place, and time. She appears well-developed and well-nourished. No distress.   HENT:   Head: Normocephalic and atraumatic.   Eyes: Pupils are equal, round, and reactive to light. Conjunctivae and EOM are normal. Right eye exhibits no discharge. Left eye exhibits no discharge. No scleral icterus.   Neck: Normal range of motion. Neck supple. No tracheal deviation present. No thyromegaly present.   Cardiovascular: Normal rate, regular rhythm and normal pulses.  Exam reveals no gallop.    No murmur heard.  Pulmonary/Chest: Effort normal and breath sounds normal. No respiratory distress. She has no wheezes. She has no rales.   Musculoskeletal: Normal range of motion.   Neurological: She is alert and oriented to person, place, and time. She exhibits normal muscle tone. Coordination normal.   Skin: Skin is warm. No rash noted. No erythema. No pallor.   Psychiatric: She has a normal mood and affect. Her behavior is normal. Judgment and thought content normal.   Nursing note and vitals reviewed.    Reviewed Data:  Admission on 07/07/2018, Discharged on 07/07/2018   Component Date Value Ref Range Status   • Glucose 07/07/2018 121* 65 - 99 mg/dL Final   • BUN 07/07/2018 16  8 - 23 mg/dL Final   • Creatinine 07/07/2018 0.75  0.57 - 1.00 mg/dL Final   • Sodium 07/07/2018 145  136 - 145 mmol/L Final   • Potassium 07/07/2018 4.3  3.5 - 5.2 mmol/L Final   • Chloride 07/07/2018 106  98 - 107 mmol/L Final   • CO2 07/07/2018 23.9  22.0 - 29.0 mmol/L Final   • Calcium 07/07/2018 9.2  8.6 - 10.5 " mg/dL Final   • Total Protein 07/07/2018 7.2  6.0 - 8.5 g/dL Final   • Albumin 07/07/2018 4.60  3.50 - 5.20 g/dL Final   • ALT (SGPT) 07/07/2018 16  1 - 33 U/L Final   • AST (SGOT) 07/07/2018 18  1 - 32 U/L Final   • Alkaline Phosphatase 07/07/2018 78  39 - 117 U/L Final   • Total Bilirubin 07/07/2018 0.3  0.1 - 1.2 mg/dL Final   • eGFR Non African Amer 07/07/2018 76  >60 mL/min/1.73 Final   • Globulin 07/07/2018 2.6  gm/dL Final   • A/G Ratio 07/07/2018 1.8  g/dL Final   • BUN/Creatinine Ratio 07/07/2018 21.3  7.0 - 25.0 Final   • Anion Gap 07/07/2018 15.1  mmol/L Final   • Troponin T 07/07/2018 <0.010  0.000 - 0.030 ng/mL Final   • PTT 07/07/2018 24.8  22.7 - 35.4 seconds Final   • Protime 07/07/2018 12.1  11.7 - 14.2 Seconds Final   • INR 07/07/2018 0.91  0.90 - 1.10 Final   • Color, UA 07/07/2018 Yellow  Yellow, Straw Final   • Appearance, UA 07/07/2018 Clear  Clear Final   • pH, UA 07/07/2018 <=5.0  5.0 - 8.0 Final   • Specific Gravity, UA 07/07/2018 1.012  1.005 - 1.030 Final   • Glucose, UA 07/07/2018 Negative  Negative Final   • Ketones, UA 07/07/2018 Negative  Negative Final   • Bilirubin, UA 07/07/2018 Negative  Negative Final   • Blood, UA 07/07/2018 Negative  Negative Final   • Protein, UA 07/07/2018 Negative  Negative Final   • Leuk Esterase, UA 07/07/2018 Negative  Negative Final   • Nitrite, UA 07/07/2018 Negative  Negative Final   • Urobilinogen, UA 07/07/2018 0.2 E.U./dL  0.2 - 1.0 E.U./dL Final   • WBC 07/07/2018 5.31  4.50 - 10.70 10*3/mm3 Final   • RBC 07/07/2018 5.04  3.90 - 5.20 10*6/mm3 Final   • Hemoglobin 07/07/2018 15.2  11.9 - 15.5 g/dL Final   • Hematocrit 07/07/2018 47.3* 35.6 - 45.5 % Final   • MCV 07/07/2018 93.8  80.5 - 98.2 fL Final   • MCH 07/07/2018 30.2  26.9 - 32.0 pg Final   • MCHC 07/07/2018 32.1* 32.4 - 36.3 g/dL Final   • RDW 07/07/2018 14.0* 11.7 - 13.0 % Final   • RDW-SD 07/07/2018 47.8  37.0 - 54.0 fl Final   • MPV 07/07/2018 10.7  6.0 - 12.0 fL Final   • Platelets  07/07/2018 229  140 - 500 10*3/mm3 Final   • Neutrophil % 07/07/2018 63.1  42.7 - 76.0 % Final   • Lymphocyte % 07/07/2018 27.3  19.6 - 45.3 % Final   • Monocyte % 07/07/2018 6.2  5.0 - 12.0 % Final   • Eosinophil % 07/07/2018 3.2  0.3 - 6.2 % Final   • Basophil % 07/07/2018 0.2  0.0 - 1.5 % Final   • Immature Grans % 07/07/2018 0.0  0.0 - 0.5 % Final   • Neutrophils, Absolute 07/07/2018 3.35  1.90 - 8.10 10*3/mm3 Final   • Lymphocytes, Absolute 07/07/2018 1.45  0.90 - 4.80 10*3/mm3 Final   • Monocytes, Absolute 07/07/2018 0.33  0.20 - 1.20 10*3/mm3 Final   • Eosinophils, Absolute 07/07/2018 0.17  0.00 - 0.70 10*3/mm3 Final   • Basophils, Absolute 07/07/2018 0.01  0.00 - 0.20 10*3/mm3 Final   • Immature Grans, Absolute 07/07/2018 0.00  0.00 - 0.03 10*3/mm3 Final

## 2018-07-24 ENCOUNTER — TELEPHONE (OUTPATIENT)
Dept: INTERNAL MEDICINE | Facility: CLINIC | Age: 73
End: 2018-07-24

## 2018-07-24 NOTE — TELEPHONE ENCOUNTER
----- Message from Parmjit Tidwell MD sent at 7/24/2018  2:08 PM EDT -----  Her cholesterol has gone back up off the statin therapy she has had both Lipitor and simvastatin both given her unwanted side effects

## 2018-07-25 ENCOUNTER — TELEPHONE (OUTPATIENT)
Dept: CARDIOLOGY | Facility: CLINIC | Age: 73
End: 2018-07-25

## 2018-07-25 RX ORDER — SIMVASTATIN 20 MG
20 TABLET ORAL NIGHTLY
Qty: 90 TABLET | Refills: 3 | Status: SHIPPED | OUTPATIENT
Start: 2018-07-25 | End: 2021-12-08 | Stop reason: ALTCHOICE

## 2018-07-25 NOTE — TELEPHONE ENCOUNTER
Pt called. She would like to talk with you about her cholesterol medication options.   Dr. Tidwell saw her on 7/23/18 and did labs, they are both in [facility].  She said she has been allergic to statins in the past but was able to take simvastatin. She was taken off of the simvastatin by Dr. Tidwell in the past due to the interaction with amlodipine. However she said she is no longer on this.  She would like to speak with ou about this. She can be reached at #[phone].    Thanks,  Eliz

## 2018-09-11 ENCOUNTER — HOSPITAL ENCOUNTER (OUTPATIENT)
Dept: MAMMOGRAPHY | Facility: HOSPITAL | Age: 73
Discharge: HOME | End: 2018-09-11
Attending: OBSTETRICS & GYNECOLOGY | Admitting: OBSTETRICS & GYNECOLOGY

## 2018-09-11 ENCOUNTER — OFFICE VISIT (OUTPATIENT)
Dept: OBSTETRICS AND GYNECOLOGY | Facility: CLINIC | Age: 73
End: 2018-09-11

## 2018-09-11 VITALS
BODY MASS INDEX: 30.43 KG/M2 | WEIGHT: 155 LBS | DIASTOLIC BLOOD PRESSURE: 82 MMHG | HEIGHT: 60 IN | SYSTOLIC BLOOD PRESSURE: 130 MMHG

## 2018-09-11 DIAGNOSIS — Z01.419 WELL WOMAN EXAM WITH ROUTINE GYNECOLOGICAL EXAM: Primary | ICD-10-CM

## 2018-09-11 DIAGNOSIS — Z12.31 VISIT FOR SCREENING MAMMOGRAM: ICD-10-CM

## 2018-09-11 PROCEDURE — G0101 CA SCREEN;PELVIC/BREAST EXAM: HCPCS | Performed by: OBSTETRICS & GYNECOLOGY

## 2018-09-11 PROCEDURE — 77063 BREAST TOMOSYNTHESIS BI: CPT

## 2018-09-11 PROCEDURE — 77067 SCR MAMMO BI INCL CAD: CPT

## 2018-09-11 NOTE — PROGRESS NOTES
Subjective   Sera Mccord is a 73 y.o. female is here today as a self referral for annual.    History of Present Illness-here today for annual exam and checkup.    The following portions of the patient's history were reviewed and updated as appropriate: allergies, current medications, past family history, past medical history, past social history, past surgical history and problem list.    Review of Systems   Constitutional: Negative.    HENT: Negative.    Eyes: Negative.    Respiratory: Negative.    Cardiovascular: Negative.    Gastrointestinal: Negative.    Endocrine: Negative.    Genitourinary: Negative.    Musculoskeletal: Negative.    Skin: Negative.    Allergic/Immunologic: Negative.    Neurological: Negative.    Hematological: Negative.    Psychiatric/Behavioral: Negative.        Objective   Physical Exam   Constitutional: She is oriented to person, place, and time. She appears well-developed and well-nourished.   HENT:   Head: Normocephalic and atraumatic.   Nose: Nose normal.   Eyes: Pupils are equal, round, and reactive to light. Conjunctivae and EOM are normal.   Neck: Normal range of motion. Neck supple.   Cardiovascular: Normal rate, regular rhythm and normal heart sounds.    Pulmonary/Chest: Effort normal and breath sounds normal. Right breast exhibits no inverted nipple, no mass, no nipple discharge, no skin change and no tenderness. Left breast exhibits no inverted nipple, no mass, no nipple discharge and no skin change. Breasts are symmetrical. There is no breast swelling.   Abdominal: Soft. Hernia confirmed negative in the right inguinal area and confirmed negative in the left inguinal area.   Genitourinary: Rectum normal. No breast tenderness, discharge or bleeding. Pelvic exam was performed with patient supine. No labial fusion. There is no rash, tenderness, lesion or injury on the right labia. There is no rash, tenderness, lesion or injury on the left labia. Right adnexum displays no mass, no  tenderness and no fullness. Left adnexum displays no mass, no tenderness and no fullness. No erythema or bleeding in the vagina. No foreign body in the vagina. No signs of injury around the vagina. No vaginal discharge found.   Neurological: She is alert and oriented to person, place, and time. She has normal reflexes.   Skin: Skin is warm and dry.   Psychiatric: She has a normal mood and affect. Her behavior is normal. Judgment and thought content normal.         Assessment/Plan   Problems Addressed this Visit     None      Visit Diagnoses     Well woman exam with routine gynecological exam    -  Primary        Mammogram done today.  Current with colonoscopy.

## 2018-09-11 NOTE — PROGRESS NOTES
"QUICK REFERENCE INFORMATION:  The ABCs of the Annual Wellness Visit    Subsequent Medicare Wellness Visit    HEALTH RISK ASSESSMENT    1945    Recent Hospitalizations:  {Hospitalization history:3089920739::\"No hospitalization(s) within the last year.\"}.        Current Medical Providers:  Patient Care Team:  Parmjit Tidwell MD as PCP - General (Family Medicine)  Parmjit Tidwell MD as PCP - Claims Attributed  Cherrie Myles RN as Care Coordinator (Population Health)        Smoking Status:  History   Smoking Status   • Never Smoker   Smokeless Tobacco   • Never Used       Alcohol Consumption:  History   Alcohol Use No       Depression Screen:   PHQ-2/PHQ-9 Depression Screening 7/23/2018   Little interest or pleasure in doing things 0   Feeling down, depressed, or hopeless 0   Trouble falling or staying asleep, or sleeping too much -   Feeling tired or having little energy -   Poor appetite or overeating -   Feeling bad about yourself - or that you are a failure or have let yourself or your family down -   Trouble concentrating on things, such as reading the newspaper or watching television -   Moving or speaking so slowly that other people could have noticed. Or the opposite - being so fidgety or restless that you have been moving around a lot more than usual -   Thoughts that you would be better off dead, or of hurting yourself in some way -   Total Score 0   If you checked off any problems, how difficult have these problems made it for you to do your work, take care of things at home, or get along with other people? -       Health Habits and Functional and Cognitive Screening:  Functional & Cognitive Status 7/23/2018   Do you have difficulty preparing food and eating? No   Do you have difficulty bathing yourself, getting dressed or grooming yourself? No   Do you have difficulty using the toilet? No   Do you have difficulty moving around from place to place? No   Do you have trouble with steps or getting out " "of a bed or a chair? No   In the past year have you fallen or experienced a near fall? No   Current Diet Well Balanced Diet   Dental Exam Up to date   Eye Exam Up to date   Exercise (times per week) 7 times per week   Current Exercise Activities Include Yard Work   Do you need help using the phone?  No   Are you deaf or do you have serious difficulty hearing?  No   Do you need help with transportation? No   Do you need help shopping? No   Do you need help preparing meals?  No   Do you need help with housework?  No   Do you need help with laundry? No   Do you need help taking your medications? No   Do you need help managing money? No   Do you ever drive or ride in a car without wearing a seat belt? No   Have you felt unusual stress, anger or loneliness in the last month? No   Who do you live with? Spouse   If you need help, do you have trouble finding someone available to you? No   Have you been bothered in the last four weeks by sexual problems? No   Do you have difficulty concentrating, remembering or making decisions? No           Does the patient have evidence of cognitive impairment? {Yes/No w/ pre-defaulted No:30360::\"No\"}    Aspirin use counseling: {Aspirin :94709}      Recent Lab Results:  CMP:  Lab Results   Component Value Date    GLU 86 01/22/2018    BUN 16 07/07/2018    CREATININE 0.75 07/07/2018    EGFRIFNONA 76 07/07/2018    EGFRIFAFRI 87 01/22/2018    BCR 21.3 07/07/2018     07/07/2018    K 4.3 07/07/2018    CO2 23.9 07/07/2018    CALCIUM 9.2 07/07/2018    ALBUMIN 4.60 07/07/2018    BILITOT 0.3 07/07/2018    ALKPHOS 78 07/07/2018    AST 18 07/07/2018    ALT 16 07/07/2018     Lipid Panel:  Lab Results   Component Value Date    TRIG 196 (H) 07/23/2018    HDL 72 (H) 07/23/2018    VLDL 39.2 07/23/2018     HbA1c:  Lab Results   Component Value Date    HGBA1C 5.60 03/15/2018       Visual Acuity:  No exam data present    Age-appropriate Screening Schedule:  Refer to the list below for future " "screening recommendations based on patient's age, sex and/or medical conditions. Orders for these recommended tests are listed in the plan section. The patient has been provided with a written plan.    Health Maintenance   Topic Date Due   • INFLUENZA VACCINE  08/01/2018   • LIPID PANEL  07/23/2019   • MAMMOGRAM  09/07/2019   • COLONOSCOPY  10/24/2026   • PNEUMOCOCCAL VACCINES (65+ LOW/MEDIUM RISK)  Excluded   • TDAP/TD VACCINES  Excluded   • ZOSTER VACCINE  Excluded        Subjective   History of Present Illness    Sera Mccord is a 73 y.o. female who presents for an Subsequent Wellness Visit.    The following portions of the patient's history were reviewed and updated as appropriate: {history reviewed:20406::\"allergies\",\"current medications\",\"past family history\",\"past medical history\",\"past social history\",\"past surgical history\",\"problem list\"}.    Outpatient Medications Prior to Visit   Medication Sig Dispense Refill   • aspirin 325 MG tablet Take 325 mg by mouth Daily.     • flecainide (TAMBOCOR) 50 MG tablet Take 0.5 tablets by mouth Every 12 (Twelve) Hours. (Patient taking differently: Take 50 mg by mouth Every 12 (Twelve) Hours.) 30 tablet 6   • losartan (COZAAR) 25 MG tablet Take 2 tablets by mouth Daily. 90 tablet 3   • simvastatin (ZOCOR) 20 MG tablet Take 1 tablet by mouth Every Night. 90 tablet 3     No facility-administered medications prior to visit.        Patient Active Problem List   Diagnosis   • Arteriosclerosis of coronary artery   • Arthritis   • Foot pain   • Histoplasmosis   • Mixed hyperlipidemia   • Cannot sleep   • Lung mass   • LAD (lymphadenopathy), mediastinal   • Plantar fasciitis   • Restless leg   • Fibromyalgia   • Hyperglycemia   • Allergic rhinitis due to allergen   • Anxiety   • Bradycardia   • Essential hypertension   • Medicare annual wellness visit, subsequent   • Paroxysmal atrial fibrillation (CMS/HCC)   • Erythrocytosis   • Vitamin D deficiency   • Mass of hard palate " "      Advance Care Planning:  {Advance Directive Status:43732}    Identification of Risk Factors:  Risk factors include: {MC; WELLNESS RISK FACTORS:43576}.    Review of Systems    Compared to one year ago, the patient feels her physical health is {better worse same:15049}.  Compared to one year ago, the patient feels her mental health is {better worse same:21308}.    Objective     Physical Exam    Vitals:    09/11/18 0900   BP: 130/82   Weight: 70.3 kg (155 lb)   Height: 152.4 cm (60\")       Patient's Body mass index is 30.27 kg/m². BMI is {BMI range:33339}.      Assessment/Plan   Patient Self-Management and Personalized Health Advice  The patient has been provided with information about: {MC; PERSONALIZED HEALTH ADVICE:41421} and preventive services including:   · {plan:45348}.    Visit Diagnoses:    ICD-10-CM ICD-9-CM   1. Well woman exam with routine gynecological exam Z01.419 V72.31       No orders of the defined types were placed in this encounter.      Outpatient Encounter Prescriptions as of 9/11/2018   Medication Sig Dispense Refill   • aspirin 325 MG tablet Take 325 mg by mouth Daily.     • Cholecalciferol (VITAMIN D3) 3000 units tablet Take  by mouth.     • flecainide (TAMBOCOR) 50 MG tablet Take 0.5 tablets by mouth Every 12 (Twelve) Hours. (Patient taking differently: Take 50 mg by mouth Every 12 (Twelve) Hours.) 30 tablet 6   • losartan (COZAAR) 25 MG tablet Take 2 tablets by mouth Daily. 90 tablet 3   • simvastatin (ZOCOR) 20 MG tablet Take 1 tablet by mouth Every Night. 90 tablet 3     No facility-administered encounter medications on file as of 9/11/2018.        Reviewed use of high risk medication in the elderly: {Response; yes/no/na:63}  Reviewed for potential of harmful drug interactions in the elderly: {Response; yes/no/na:63}    Follow Up:  No Follow-up on file.     An After Visit Summary and PPPS with all of these plans were given to the patient.         "

## 2018-10-31 ENCOUNTER — OFFICE VISIT (OUTPATIENT)
Dept: CARDIOLOGY | Facility: CLINIC | Age: 73
End: 2018-10-31

## 2018-10-31 VITALS
HEART RATE: 42 BPM | SYSTOLIC BLOOD PRESSURE: 162 MMHG | HEIGHT: 60 IN | WEIGHT: 159.6 LBS | DIASTOLIC BLOOD PRESSURE: 92 MMHG | BODY MASS INDEX: 31.33 KG/M2

## 2018-10-31 DIAGNOSIS — I10 ESSENTIAL HYPERTENSION: ICD-10-CM

## 2018-10-31 DIAGNOSIS — I48.0 PAROXYSMAL ATRIAL FIBRILLATION (HCC): Primary | ICD-10-CM

## 2018-10-31 PROCEDURE — 93000 ELECTROCARDIOGRAM COMPLETE: CPT | Performed by: INTERNAL MEDICINE

## 2018-10-31 PROCEDURE — 99213 OFFICE O/P EST LOW 20 MIN: CPT | Performed by: INTERNAL MEDICINE

## 2018-10-31 RX ORDER — LOSARTAN POTASSIUM 50 MG/1
50 TABLET ORAL DAILY
Qty: 90 TABLET | Refills: 3 | Status: SHIPPED | OUTPATIENT
Start: 2018-10-31 | End: 2021-12-08

## 2018-10-31 RX ORDER — FLECAINIDE ACETATE 50 MG/1
50 TABLET ORAL 2 TIMES DAILY
COMMUNITY
End: 2020-02-13 | Stop reason: SDUPTHER

## 2018-10-31 NOTE — PROGRESS NOTES
Date of Office Visit: 2018  Encounter Provider: Mango Linda MD  Place of Service: [facility]  Patient Name: Sera Mccord  :1945  [id number]    Chief Complaint   Patient presents with   • Coronary Artery Disease   :     HPI: Sera Mccord is a 73 y.o. female  She is here for follow up of her atrial fibrillation and hypertension.  Her hypertension has been better at home since we switched her to losartan.  It is running between the 110s to 130s.  It is a little bit high here today.  She has not had any atrial fibrillation since we increased flecainide to 50 mg twice a day.  No chest pain.  No shortness of breath.  No PND, orthopnea or edema.  Otherwise, she has been doing well.      Past Medical History:   Diagnosis Date   • Abnormal finding on lung imaging    • Arthritis     DJD Multiple joints spine, shoulders, knees, left hip. 2013 normal joint examination. Patient has 6 positive fibromyalgia tender points.   • Coronary artery disease      markedly abnl stress and nl cath; poss syndrome x  felt terrible with metoprolol   • Fibromyalgia    • Foot pain    • Hemorrhoids     internal and external   • Histoplasmosis    • Hyperlipidemia    • Hypertension    • IBS (irritable bowel syndrome)    • Insomnia     affected by burning in legs and also pain in right ear and occipit   • Lung mass    • Mediastinal lymphadenopathy    • Night sweat    • PAF (paroxysmal atrial fibrillation) (CMS/HCC)    • Plantar fasciitis      right side   • Restless leg syndrome    • Sore throat        Past Surgical History:   Procedure Laterality Date   • CARDIAC CATHETERIZATION     • COLONOSCOPY N/A 10/24/2016    Procedure: COLONOSCOPY;  Surgeon: Lauren Riech MD;  Location: [facility];  Service:    • COLONOSCOPY W/ POLYPECTOMY      Dr. Lauren Reich   • HYSTERECTOMY     • UPPER GASTROINTESTINAL ENDOSCOPY  2015    Dr. Lauren Reich       Social  History     Social History   • Marital status:      Spouse name: N/A   • Number of children: N/A   • Years of education: N/A     Occupational History   • Not on file.     Social History Main Topics   • Smoking status: Never Smoker   • Smokeless tobacco: Never Used   • Alcohol use No   • Drug use: No   • Sexual activity: Defer     Other Topics Concern   • Not on file     Social History Narrative   • No narrative on file       Family History   Problem Relation Age of Onset   • Hypertension Mother    • Heart disease Mother    • Heart disease Father    • Heart attack Father    • Skin cancer Maternal Grandmother    • No Known Problems Maternal Grandfather    • Arthritis Paternal Grandmother    • Heart disease Paternal Grandfather    • Ovarian cancer Maternal Aunt        Review of Systems   Constitution: Negative for decreased appetite, fever, malaise/fatigue and weight loss.   HENT: Negative for nosebleeds.    Eyes: Negative for double vision.   Cardiovascular: Negative for chest pain, claudication, cyanosis, dyspnea on exertion, irregular heartbeat, leg swelling, near-syncope, orthopnea, palpitations, paroxysmal nocturnal dyspnea and syncope.   Respiratory: Negative for cough, hemoptysis and shortness of breath.    Hematologic/Lymphatic: Negative for bleeding problem.   Skin: Negative for rash.   Musculoskeletal: Negative for falls and myalgias.   Gastrointestinal: Negative for hematochezia, jaundice, melena, nausea and vomiting.   Genitourinary: Negative for hematuria.   Neurological: Negative for dizziness and seizures.   Psychiatric/Behavioral: Negative for altered mental status and memory loss.       Allergies   Allergen Reactions   • Duloxetine      Leg swelling   • Lisinopril      Leg swelling   • Losartan Myalgia   • Mobic [Meloxicam] Other (See Comments) and GI Intolerance     increased heart rate and elevated blood pressure   • Codeine Nausea And Vomiting   • Levofloxacin Swelling   • Sulfa Antibiotics  "Nausea And Vomiting         Current Outpatient Prescriptions:   •  aspirin 325 MG tablet, Take 325 mg by mouth Daily., Disp: , Rfl:   •  Cholecalciferol (VITAMIN D3) 3000 units tablet, Take  by mouth., Disp: , Rfl:   •  flecainide (TAMBOCOR) 50 MG tablet, Take 50 mg by mouth 2 (Two) Times a Day., Disp: , Rfl:   •  losartan (COZAAR) 50 MG tablet, Take 1 tablet by mouth Daily., Disp: 90 tablet, Rfl: 3  •  simvastatin (ZOCOR) 20 MG tablet, Take 1 tablet by mouth Every Night., Disp: 90 tablet, Rfl: 3      Objective:     Vitals:    10/31/18 0957   BP: 162/92   Pulse: (!) 42   Weight: 72.4 kg (159 lb 9.6 oz)   Height: 152.4 cm (60\")     Body mass index is 31.17 kg/m².    Physical Exam   Constitutional: She is oriented to person, place, and time. She appears well-developed and well-nourished.   HENT:   Head: Normocephalic.   Eyes: No scleral icterus.   Neck: No JVD present. No thyromegaly present.   Cardiovascular: Normal rate, regular rhythm and normal heart sounds.  Exam reveals no gallop and no friction rub.    No murmur heard.  Pulmonary/Chest: Effort normal and breath sounds normal. She has no wheezes. She has no rales.   Abdominal: Soft. There is no hepatosplenomegaly. There is no tenderness.   Musculoskeletal: Normal range of motion. She exhibits no edema.   Lymphadenopathy:     She has no cervical adenopathy.   Neurological: She is alert and oriented to person, place, and time.   Skin: Skin is warm and dry. No rash noted.   Psychiatric: She has a normal mood and affect.         ECG 12 Lead  Date/Time: 10/31/2018 10:49 AM  Performed by: MULUGETA JOHNSON  Authorized by: MULUGETA JOHNSON   Comparison: compared with previous ECG   Similar to previous ECG  Clinical impression: abnormal ECG             Assessment:       Diagnosis Plan   1. Paroxysmal atrial fibrillation (CMS/HCC)     2. Essential hypertension            Plan:       I think we have two things going on here.  One is hypertension that is not well controlled.  " She may be having a side effect from the amlodipine.  I am just going to stop it altogether, and I would like to put her on losartan.  She had some edema with lisinopril but not angioedema.  I am going to have her come back and get her blood pressure checked on Friday.  I am going to put her on 50 mg a day.  If that is not adequate, we will go to 100 mg.  I am going to have her increase her flecainide to 50 mg twice a day and see how she tolerates that.  If we keep having paroxysmal atrial fibrillation then we probably are going to have to have her see Dr. Griffith.     Atrial Fibrillation and Atrial Flutter  Assessment  • The patient has paroxysmal atrial fibrillation  • This is non-valvular in etiology  • The patient's CHADS2-VASc score is 3  • A [id number] score of 2 or more is considered a high risk for a thromboembolic event    Plan  • Attempt to maintain sinus rhythm  • Continue flecainide for rhythm control    As always, it has been a pleasure to participate in your patient's care.      Sincerely,       Mango Linda MD

## 2019-06-20 ENCOUNTER — APPOINTMENT (OUTPATIENT)
Dept: CT IMAGING | Facility: HOSPITAL | Age: 74
End: 2019-06-20
Attending: THORACIC SURGERY (CARDIOTHORACIC VASCULAR SURGERY)

## 2019-07-03 ENCOUNTER — HOSPITAL ENCOUNTER (OUTPATIENT)
Dept: CT IMAGING | Facility: HOSPITAL | Age: 74
Discharge: HOME | End: 2019-07-03
Attending: THORACIC SURGERY (CARDIOTHORACIC VASCULAR SURGERY) | Admitting: THORACIC SURGERY (CARDIOTHORACIC VASCULAR SURGERY)

## 2019-07-03 DIAGNOSIS — R91.1 SOLITARY PULMONARY NODULE: ICD-10-CM

## 2019-07-03 PROCEDURE — 71250 CT THORAX DX C-: CPT

## 2019-07-10 ENCOUNTER — OFFICE VISIT (OUTPATIENT)
Dept: SURGERY | Facility: CLINIC | Age: 74
End: 2019-07-10

## 2019-07-10 VITALS
BODY MASS INDEX: 31.41 KG/M2 | HEART RATE: 45 BPM | HEIGHT: 60 IN | OXYGEN SATURATION: 98 % | SYSTOLIC BLOOD PRESSURE: 159 MMHG | WEIGHT: 160 LBS | DIASTOLIC BLOOD PRESSURE: 64 MMHG

## 2019-07-10 DIAGNOSIS — R91.8 LUNG MASS: Primary | ICD-10-CM

## 2019-07-10 PROCEDURE — 99212 OFFICE O/P EST SF 10 MIN: CPT | Performed by: THORACIC SURGERY (CARDIOTHORACIC VASCULAR SURGERY)

## 2019-07-10 NOTE — PROGRESS NOTES
Subjective   Patient ID: Sera Mccord is a 74 y.o. female is here today for follow-up.    History of Present Illness  Dear Colleague,  Sera Mccord was seen in our office today for further follow-up of a right lung nodule first identified in August 2015.  Patient had a CT directed biopsy which showed granulomatous disease.  We have been following her with serial CT scans.  She has no specific pulmonary complaints.  She has no cough or hemoptysis.  She has no hoarseness or change in her voice.  She has no pleuritic pain.  She has no unexplained weight loss.  She is active without significant shortness of breath or wheezing.    The following portions of the patient's history were reviewed and updated as appropriate: allergies, current medications, past family history, past medical history, past social history, past surgical history and problem list.  Review of Systems   Constitution: Negative.   HENT: Negative.    Eyes: Negative.    Cardiovascular: Negative.    Respiratory: Negative.    Endocrine: Negative.    Hematologic/Lymphatic: Negative.    Skin: Negative.    Musculoskeletal: Negative.    Gastrointestinal: Negative.    Genitourinary: Negative.    Neurological: Negative.    Psychiatric/Behavioral: Negative.    Allergic/Immunologic: Negative.      Patient Active Problem List   Diagnosis   • Arteriosclerosis of coronary artery   • Arthritis   • Foot pain   • Histoplasmosis   • Mixed hyperlipidemia   • Cannot sleep   • Lung mass   • LAD (lymphadenopathy), mediastinal   • Plantar fasciitis   • Restless leg   • Fibromyalgia   • Hyperglycemia   • Allergic rhinitis due to allergen   • Anxiety   • Bradycardia   • Essential hypertension   • Medicare annual wellness visit, subsequent   • Paroxysmal atrial fibrillation (CMS/HCC)   • Erythrocytosis   • Vitamin D deficiency   • Mass of hard palate     Past Medical History:   Diagnosis Date   • Abnormal finding on lung imaging    • Arthritis     DJD Multiple joints spine,  shoulders, knees, left hip. December 2013 normal joint examination. Patient has 6 positive fibromyalgia tender points.   • Coronary artery disease     12/14 markedly abnl stress and nl cath; poss syndrome x  felt terrible with metoprolol   • Fibromyalgia    • Foot pain    • Hemorrhoids     internal and external   • Histoplasmosis    • Hyperlipidemia    • Hypertension    • IBS (irritable bowel syndrome)    • Insomnia     affected by burning in legs and also pain in right ear and occipit   • Lung mass    • Mediastinal lymphadenopathy    • Night sweat    • PAF (paroxysmal atrial fibrillation) (CMS/HCC)    • Plantar fasciitis     12/13 right side   • Restless leg syndrome    • Sore throat      Past Surgical History:   Procedure Laterality Date   • CARDIAC CATHETERIZATION  2015   • COLONOSCOPY N/A 10/24/2016    Procedure: COLONOSCOPY;  Surgeon: Lauren Reich MD;  Location: [facility];  Service:    • COLONOSCOPY W/ POLYPECTOMY  2013    Dr. Lauren Reich   • HYSTERECTOMY     • UPPER GASTROINTESTINAL ENDOSCOPY  11/2015    Dr. Lauren Reich     Family History   Problem Relation Age of Onset   • Hypertension Mother    • Heart disease Mother    • Heart disease Father    • Heart attack Father    • Skin cancer Maternal Grandmother    • No Known Problems Maternal Grandfather    • Arthritis Paternal Grandmother    • Heart disease Paternal Grandfather    • Ovarian cancer Maternal Aunt      Social History     Socioeconomic History   • Marital status:      Spouse name: Not on file   • Number of children: Not on file   • Years of education: Not on file   • Highest education level: Not on file   Tobacco Use   • Smoking status: Never Smoker   • Smokeless tobacco: Never Used   Substance and Sexual Activity   • Alcohol use: No   • Drug use: No   • Sexual activity: Defer       Current Outpatient Medications:   •  aspirin 325 MG tablet, Take 325 mg by mouth Daily., Disp: , Rfl:   •  Cholecalciferol (VITAMIN D3) 3000 units  tablet, Take  by mouth., Disp: , Rfl:   •  flecainide (TAMBOCOR) 50 MG tablet, Take 50 mg by mouth 2 (Two) Times a Day., Disp: , Rfl:   •  losartan (COZAAR) 50 MG tablet, Take 1 tablet by mouth Daily., Disp: 90 tablet, Rfl: 3  •  simvastatin (ZOCOR) 20 MG tablet, Take 1 tablet by mouth Every Night., Disp: 90 tablet, Rfl: 3  Allergies   Allergen Reactions   • Duloxetine      Leg swelling   • Lisinopril      Leg swelling   • Losartan Myalgia   • Mobic [Meloxicam] Other (See Comments) and GI Intolerance     increased heart rate and elevated blood pressure   • Codeine Nausea And Vomiting   • Levofloxacin Swelling   • Sulfa Antibiotics Nausea And Vomiting        Objective   Vitals:    07/10/19 0915   BP: 159/64   Pulse: (!) 45   SpO2: 98%     Physical Exam   Constitutional: She is oriented to person, place, and time. She appears well-developed and well-nourished.   HENT:   Head: Normocephalic.   Eyes: Conjunctivae, EOM and lids are normal. Pupils are equal, round, and reactive to light.   Neck: Trachea normal and normal range of motion. Neck supple. No hepatojugular reflux and no JVD present. Carotid bruit is not present. No thyroid mass and no thyromegaly present.   Cardiovascular: Normal rate, regular rhythm, S1 normal, S2 normal, normal heart sounds and normal pulses.  No extrasystoles are present. PMI is not displaced.   Pulmonary/Chest: Effort normal and breath sounds normal.   Abdominal: Soft. Normal appearance and bowel sounds are normal. She exhibits no mass. There is no hepatosplenomegaly. There is no tenderness. No hernia.   Musculoskeletal: Normal range of motion.   Neurological: She is alert and oriented to person, place, and time. She has normal strength and normal reflexes. No cranial nerve deficit or sensory deficit. She displays a negative Romberg sign.   Skin: Skin is warm, dry and intact.   Psychiatric: She has a normal mood and affect. Her speech is normal and behavior is normal. Judgment and thought  content normal. Cognition and memory are normal.     Independent Review of Radiographic Studies:    CT of the chest performed July 3, 2019 was independently reviewed and compared to previous x-rays.  The 9 mm nodular density in the right upper lobe has adjacent linear scarring.  This is unchanged from previous scans and in fact the nodule may actually be smaller.  There is a small groundglass opacity in the medial basilar aspect of the left lower lobe that appears to be inflammatory.  There are no other infiltrates nodules or masses.  No pleural effusions.  There is no hilar or mediastinal adenopathy.    Assessment/Plan   Assessment:  Patient is a never smoker with a CT scan that is been stable since 2015.  I do not believe that further follow-up with CT scans is warranted.  I recommended that she continue follow-up through her primary care physician's office.  She will return here on an as-needed basis.    Plan: Return to the office as needed.  Will be glad to see the patient anytime she feels it is necessary.    Diagnoses and all orders for this visit:    Lung mass

## 2019-11-01 ENCOUNTER — OFFICE VISIT (OUTPATIENT)
Dept: CARDIOLOGY | Facility: CLINIC | Age: 74
End: 2019-11-01

## 2019-11-01 VITALS
DIASTOLIC BLOOD PRESSURE: 74 MMHG | WEIGHT: 163.4 LBS | BODY MASS INDEX: 32.08 KG/M2 | OXYGEN SATURATION: 97 % | RESPIRATION RATE: 18 BRPM | HEIGHT: 60 IN | SYSTOLIC BLOOD PRESSURE: 156 MMHG | HEART RATE: 61 BPM

## 2019-11-01 DIAGNOSIS — I48.0 PAROXYSMAL ATRIAL FIBRILLATION (HCC): Primary | ICD-10-CM

## 2019-11-01 DIAGNOSIS — I10 ESSENTIAL HYPERTENSION: ICD-10-CM

## 2019-11-01 PROCEDURE — 99214 OFFICE O/P EST MOD 30 MIN: CPT | Performed by: NURSE PRACTITIONER

## 2019-11-01 PROCEDURE — 93000 ELECTROCARDIOGRAM COMPLETE: CPT | Performed by: NURSE PRACTITIONER

## 2020-02-13 RX ORDER — FLECAINIDE ACETATE 50 MG/1
50 TABLET ORAL 2 TIMES DAILY
Qty: 180 TABLET | Refills: 2 | Status: SHIPPED | OUTPATIENT
Start: 2020-02-13

## 2020-02-18 ENCOUNTER — TRANSCRIBE ORDERS (OUTPATIENT)
Dept: ADMINISTRATIVE | Facility: HOSPITAL | Age: 75
End: 2020-02-18

## 2020-02-18 DIAGNOSIS — Z12.39 SCREENING BREAST EXAMINATION: Primary | ICD-10-CM

## 2020-04-01 ENCOUNTER — APPOINTMENT (OUTPATIENT)
Dept: MAMMOGRAPHY | Facility: HOSPITAL | Age: 75
End: 2020-04-01

## 2020-05-07 ENCOUNTER — APPOINTMENT (OUTPATIENT)
Dept: MAMMOGRAPHY | Facility: HOSPITAL | Age: 75
End: 2020-05-07

## 2020-05-11 ENCOUNTER — HOSPITAL ENCOUNTER (OUTPATIENT)
Dept: MAMMOGRAPHY | Facility: HOSPITAL | Age: 75
Discharge: HOME | End: 2020-05-11
Admitting: FAMILY MEDICINE

## 2020-05-11 DIAGNOSIS — Z12.39 SCREENING BREAST EXAMINATION: ICD-10-CM

## 2020-05-11 PROCEDURE — 77063 BREAST TOMOSYNTHESIS BI: CPT

## 2020-05-11 PROCEDURE — 77067 SCR MAMMO BI INCL CAD: CPT

## 2020-11-11 ENCOUNTER — HOSPITAL ENCOUNTER (OUTPATIENT)
Dept: GENERAL RADIOLOGY | Facility: HOSPITAL | Age: 75
Discharge: HOME | End: 2020-11-11
Admitting: FAMILY MEDICINE

## 2020-11-11 DIAGNOSIS — M54.2 NECK PAIN: ICD-10-CM

## 2020-11-11 DIAGNOSIS — M50.30 DEGENERATION OF CERVICAL INTERVERTEBRAL DISC: ICD-10-CM

## 2020-11-11 DIAGNOSIS — M62.838 MUSCLE SPASM: ICD-10-CM

## 2020-11-11 PROCEDURE — 72040 X-RAY EXAM NECK SPINE 2-3 VW: CPT

## 2021-01-12 ENCOUNTER — OFFICE VISIT (OUTPATIENT)
Dept: CARDIOLOGY | Facility: CLINIC | Age: 76
End: 2021-01-12

## 2021-01-12 VITALS
DIASTOLIC BLOOD PRESSURE: 78 MMHG | BODY MASS INDEX: 34.24 KG/M2 | HEIGHT: 60 IN | SYSTOLIC BLOOD PRESSURE: 170 MMHG | WEIGHT: 174.4 LBS | HEART RATE: 51 BPM

## 2021-01-12 DIAGNOSIS — I10 ESSENTIAL HYPERTENSION: ICD-10-CM

## 2021-01-12 DIAGNOSIS — I48.0 PAROXYSMAL ATRIAL FIBRILLATION (HCC): Primary | ICD-10-CM

## 2021-01-12 PROCEDURE — 93000 ELECTROCARDIOGRAM COMPLETE: CPT | Performed by: INTERNAL MEDICINE

## 2021-01-12 PROCEDURE — 99213 OFFICE O/P EST LOW 20 MIN: CPT | Performed by: INTERNAL MEDICINE

## 2021-01-12 RX ORDER — ASPIRIN 81 MG/1
81 TABLET ORAL DAILY
Start: 2021-01-12

## 2021-01-12 NOTE — PROGRESS NOTES
Date of Office Visit: 21  Encounter Provider: Mango Linda MD  Place of Service: [facility] CARDIOLOGY  Patient Name: Sera Mccord  :1945  [id number]    Chief Complaint   Patient presents with   • Atrial Fibrillation   • Hypertension   :     HPI: Sera Mccord is a 75 y.o. female she has had a history of paroxysmal A. fib that we have managed with flecainide she has had normal LV function normal stress test in the past.  She is has a history of hypertension.  We had her on Xarelto but she could not afford it and she does not want to take warfarin so she has been on a full aspirin a day.  She thinks maybe she had one episode of A. fib in the last year.  She has not had any bleeding difficulty no other real complaints.    Past Medical History:   Diagnosis Date   • Abnormal finding on lung imaging    • Arthritis     DJD Multiple joints spine, shoulders, knees, left hip. 2013 normal joint examination. Patient has 6 positive fibromyalgia tender points.   • Coronary artery disease      markedly abnl stress and nl cath; poss syndrome x  felt terrible with metoprolol   • Fibromyalgia    • Foot pain    • Hemorrhoids     internal and external   • Histoplasmosis    • Hyperlipidemia    • Hypertension    • IBS (irritable bowel syndrome)    • Insomnia     affected by burning in legs and also pain in right ear and occipit   • Lung mass    • Mediastinal lymphadenopathy    • Night sweat    • PAF (paroxysmal atrial fibrillation) (CMS/HCC)    • Plantar fasciitis      right side   • Restless leg syndrome    • Sore throat        Past Surgical History:   Procedure Laterality Date   • CARDIAC CATHETERIZATION     • COLONOSCOPY N/A 10/24/2016    Procedure: COLONOSCOPY;  Surgeon: Lauren Reich MD;  Location: [facility] ENDOSCOPY;  Service:    • COLONOSCOPY W/ POLYPECTOMY      Dr. Lauren Reich   • HYSTERECTOMY     • UPPER GASTROINTESTINAL ENDOSCOPY  2015    Dr. Aguilar  [facility]       Social History     Socioeconomic History   • Marital status:      Spouse name: Not on file   • Number of children: Not on file   • Years of education: Not on file   • Highest education level: Not on file   Tobacco Use   • Smoking status: Never Smoker   • Smokeless tobacco: Never Used   • Tobacco comment: OCCASSIONAL CAFFEINE USE: CHOCOLATE ONLY   Substance and Sexual Activity   • Alcohol use: No   • Drug use: No   • Sexual activity: Defer       Family History   Problem Relation Age of Onset   • Hypertension Mother    • Heart disease Mother    • Heart disease Father    • Heart attack Father    • Skin cancer Maternal Grandmother    • No Known Problems Maternal Grandfather    • Arthritis Paternal Grandmother    • Heart disease Paternal Grandfather    • Ovarian cancer Maternal Aunt        Review of Systems   Constitution: Negative for decreased appetite, fever, malaise/fatigue and weight loss.   HENT: Negative for nosebleeds.    Eyes: Negative for double vision.   Cardiovascular: Negative for chest pain, claudication, cyanosis, dyspnea on exertion, irregular heartbeat, leg swelling, near-syncope, orthopnea, palpitations, paroxysmal nocturnal dyspnea and syncope.   Respiratory: Negative for cough, hemoptysis and shortness of breath.    Hematologic/Lymphatic: Negative for bleeding problem.   Skin: Negative for rash.   Musculoskeletal: Negative for falls and myalgias.   Gastrointestinal: Negative for hematochezia, jaundice, melena, nausea and vomiting.   Genitourinary: Negative for hematuria.   Neurological: Negative for dizziness and seizures.   Psychiatric/Behavioral: Negative for altered mental status and memory loss.       Allergies   Allergen Reactions   • Duloxetine      Leg swelling   • Lisinopril      Leg swelling   • Losartan Myalgia   • Mobic [Meloxicam] Other (See Comments) and GI Intolerance     increased heart rate and elevated blood pressure   • Codeine Nausea And Vomiting   •  "Levofloxacin Swelling   • Sulfa Antibiotics Nausea And Vomiting         Current Outpatient Medications:   •  Cholecalciferol (VITAMIN D3) 3000 units tablet, Take  by mouth., Disp: , Rfl:   •  flecainide (TAMBOCOR) 50 MG tablet, Take 1 tablet by mouth 2 (Two) Times a Day., Disp: 180 tablet, Rfl: 2  •  losartan (COZAAR) 50 MG tablet, Take 1 tablet by mouth Daily., Disp: 90 tablet, Rfl: 3  •  simvastatin (ZOCOR) 20 MG tablet, Take 1 tablet by mouth Every Night., Disp: 90 tablet, Rfl: 3  •  aspirin (aspirin) 81 MG EC tablet, Take 1 tablet by mouth Daily., Disp:  , Rfl:       Objective:     Vitals:    01/12/21 1355   BP: 170/78   Pulse: 51   Weight: 79.1 kg (174 lb 6.4 oz)   Height: 152.4 cm (60\")     Body mass index is 34.06 kg/m².    Constitutional:       Appearance: Well-developed.   Eyes:      General: No scleral icterus.  HENT:      Head: Normocephalic.   Neck:      Thyroid: No thyromegaly.      Vascular: No JVD.      Lymphadenopathy: No cervical adenopathy.   Pulmonary:      Effort: Pulmonary effort is normal.      Breath sounds: Normal breath sounds. No wheezing. No rales.   Cardiovascular:      Normal rate. Regular rhythm.      No gallop.   Edema:     Peripheral edema absent.   Abdominal:      Palpations: Abdomen is soft.      Tenderness: There is no abdominal tenderness.   Musculoskeletal: Normal range of motion.   Skin:     General: Skin is warm and dry.      Findings: No rash.   Neurological:      Mental Status: Alert and oriented to person, place, and time.           ECG 12 Lead    Date/Time: 1/12/2021 2:33 PM  Performed by: Mango Linda MD  Authorized by: Mango Linda MD   Comparison: compared with previous ECG   Similar to previous ECG  Rhythm: sinus bradycardia    Clinical impression: abnormal EKG  Comments: Positive U waves             Assessment:       Diagnosis Plan   1. Paroxysmal atrial fibrillation (CMS/HCC)     2. Essential hypertension            Plan:       I spoke with her about " anticoagulation she does not want to take warfarin she cannot afford the other meds she does not have much A. fib but she does have it and she has a [id number] score of 2 so she is going to go to an 81 mg aspirin a day it may offer little benefit probably not much.  Blood pressures are better at home than they are here so not can change anything with that, have her come back and see Selena or Sadia in a year and see me in 2 years    As always, it has been a pleasure to participate in your patient's care.      Sincerely,       Mango Linda MD

## 2021-04-30 ENCOUNTER — TRANSCRIBE ORDERS (OUTPATIENT)
Dept: ADMINISTRATIVE | Facility: HOSPITAL | Age: 76
End: 2021-04-30

## 2021-04-30 DIAGNOSIS — Z12.31 SCREENING MAMMOGRAM, ENCOUNTER FOR: Primary | ICD-10-CM

## 2021-04-30 DIAGNOSIS — Z78.0 MENOPAUSE: ICD-10-CM

## 2021-07-07 ENCOUNTER — HOSPITAL ENCOUNTER (OUTPATIENT)
Dept: MAMMOGRAPHY | Facility: HOSPITAL | Age: 76
Discharge: HOME | End: 2021-07-07

## 2021-07-07 ENCOUNTER — HOSPITAL ENCOUNTER (OUTPATIENT)
Dept: BONE DENSITY | Facility: HOSPITAL | Age: 76
Discharge: HOME | End: 2021-07-07

## 2021-07-07 DIAGNOSIS — Z78.0 MENOPAUSE: ICD-10-CM

## 2021-07-07 DIAGNOSIS — Z12.31 SCREENING MAMMOGRAM, ENCOUNTER FOR: ICD-10-CM

## 2021-07-07 PROCEDURE — 77067 SCR MAMMO BI INCL CAD: CPT

## 2021-07-07 PROCEDURE — 77063 BREAST TOMOSYNTHESIS BI: CPT

## 2021-07-07 PROCEDURE — 77080 DXA BONE DENSITY AXIAL: CPT

## 2021-09-07 ENCOUNTER — TRANSCRIBE ORDERS (OUTPATIENT)
Dept: CARDIOLOGY | Facility: CLINIC | Age: 76
End: 2021-09-07

## 2021-09-07 DIAGNOSIS — I48.91 ATRIAL FIBRILLATION, UNSPECIFIED TYPE (HCC): ICD-10-CM

## 2021-09-07 DIAGNOSIS — R06.09 DYSPNEA ON EXERTION: Primary | ICD-10-CM

## 2021-09-09 ENCOUNTER — TELEPHONE (OUTPATIENT)
Dept: CARDIOLOGY | Facility: CLINIC | Age: 76
End: 2021-09-09

## 2021-09-09 NOTE — TELEPHONE ENCOUNTER
Patient calling stating her PCP has ordered a stress test  She states she had 3 night were she woke up with her BP being high, heart rate is fine  Wants to know if she really needs the stress test    [phone]

## 2021-09-14 ENCOUNTER — HOSPITAL ENCOUNTER (OUTPATIENT)
Dept: CARDIOLOGY | Facility: HOSPITAL | Age: 76
Discharge: HOME | End: 2021-09-14

## 2021-09-14 DIAGNOSIS — R06.09 DYSPNEA ON EXERTION: ICD-10-CM

## 2021-09-14 DIAGNOSIS — I48.91 ATRIAL FIBRILLATION, UNSPECIFIED TYPE (HCC): ICD-10-CM

## 2021-11-10 ENCOUNTER — TRANSCRIBE ORDERS (OUTPATIENT)
Dept: ADMINISTRATIVE | Facility: HOSPITAL | Age: 76
End: 2021-11-10

## 2021-11-10 DIAGNOSIS — R10.11 RIGHT UPPER QUADRANT PAIN: Primary | ICD-10-CM

## 2021-12-04 NOTE — PROGRESS NOTES
SURGERY  Serapetrona Mccord   1945 12/08/21    Chief Complaint:  gallbladder    HPI    Ms. Mccord is a very nice 76 y.o. female who presents with complaints of a fullness and pressure pulling feeling in the right upper quadrant that radiates around to her back.  It is particularly worse when she lays down at night.  It does seem to get worse with activity.  However she has noticed that with a decrease in eating and weight loss it has improved.  Complicating diagnosis is the fact that she has fibromyalgia.  She denies any sense of burning or reflux or any significant colon changes and says for the most part she feels like she can eat without any difficulty and is having bowel movements normally.      Her US GB was negative for stones, did show hepatic steatosis, and a lesion in the kidney which is indeterminate despite both US and CT chest, thus contrast enhanced CT renal recommended.  She had an UGI in 2015 showing a small hiatal hernia with GE reflux noted, moderate (Nicho).    She had a colonoscopy in 2016, but her daughter has colon cancer and knows she is due for 1 now.  She had colonic hemangiomas and internal hemorrhoids that were thought to account for her rectal bleeding at that time.   It seems highly unlikely, but I want to make sure that there is not something in the liver that could relate to some sort of syndrome that it is manifest in part by her colonic medialmost.    Past Medical History:   Diagnosis Date   • Abnormal finding on lung imaging    • Acid reflux    • Arthritis     DJD Multiple joints spine, shoulders, knees, left hip. December 2013 normal joint examination. Patient has 6 positive fibromyalgia tender points.   • Colon polyps    • Coronary artery disease     12/14 markedly abnl stress and nl cath; poss syndrome x  felt terrible with metoprolol   • Fibromyalgia    • Foot pain    • Hemorrhoids     internal and external   • Histoplasmosis    • Hyperlipidemia    • Hypertension    • IBS  (irritable bowel syndrome)    • Insomnia     affected by burning in legs and also pain in right ear and occipit   • Lung mass    • Mediastinal lymphadenopathy    • Night sweat    • PAF (paroxysmal atrial fibrillation) (HCC)    • Plantar fasciitis     12/13 right side   • Restless leg syndrome    • Skin cancer    • Sore throat      Past Surgical History:   Procedure Laterality Date   • CARDIAC CATHETERIZATION  2015   • COLONOSCOPY N/A 10/24/2016    Procedure: COLONOSCOPY;  Surgeon: Lauren Reich MD;  Location: [facility] ENDOSCOPY;  Service:    • COLONOSCOPY W/ POLYPECTOMY  2013    Dr. Lauren Reich   • HYSTERECTOMY  1986   • SKIN CANCER EXCISION      nose   • UPPER GASTROINTESTINAL ENDOSCOPY  11/2015    Dr. Lauren Reich     Family History   Problem Relation Age of Onset   • Hypertension Mother    • Heart disease Mother    • Skin cancer Mother    • Heart disease Father    • Heart attack Father    • Skin cancer Maternal Grandmother    • No Known Problems Maternal Grandfather    • Arthritis Paternal Grandmother    • Heart disease Paternal Grandfather    • Ovarian cancer Maternal Aunt    • Breast cancer Neg Hx      Social History     Socioeconomic History   • Marital status:    Tobacco Use   • Smoking status: Never Smoker   • Smokeless tobacco: Never Used   • Tobacco comment: OCCASSIONAL CAFFEINE USE: CHOCOLATE ONLY   Vaping Use   • Vaping Use: Never used   Substance and Sexual Activity   • Alcohol use: No   • Drug use: No   • Sexual activity: Defer         Current Outpatient Medications:   •  aspirin (aspirin) 81 MG EC tablet, Take 1 tablet by mouth Daily., Disp:  , Rfl:   •  Cholecalciferol (VITAMIN D3 PO), Take 1,000 Units by mouth Daily., Disp: , Rfl:   •  flecainide (TAMBOCOR) 50 MG tablet, Take 1 tablet by mouth 2 (Two) Times a Day., Disp: 180 tablet, Rfl: 2  •  pravastatin (PRAVACHOL) 20 MG tablet, Take 20 mg by mouth Daily., Disp: , Rfl:   •  valsartan (DIOVAN) 160 MG tablet, Take 160 mg by mouth Daily.,  "Disp: , Rfl:     Allergies   Allergen Reactions   • Duloxetine      Leg swelling   • Lisinopril      Leg swelling   • Losartan Myalgia   • Mobic [Meloxicam] Other (See Comments) and GI Intolerance     increased heart rate and elevated blood pressure   • Pseudoephedrine Other (See Comments)     Patient states either caused leg swelling or GI intolerance   • Codeine Nausea And Vomiting   • Levofloxacin Swelling   • Sulfa Antibiotics Nausea And Vomiting     Vitals:    12/08/21 1323   Weight: 73.9 kg (163 lb)   Height: 152.4 cm (60\")       PHYSICAL EXAM:    Ht 152.4 cm (60\")   Wt 73.9 kg (163 lb)   BMI 31.83 kg/m²   Body mass index is 31.83 kg/m².    Constitutional: well developed, well nourished, appears  healthy, stated age  ENMT: Hearing intact, neck without masses  CVS: RRR, no murmur  Respiratory: CTA, normal respiratory effort   Gastrointestinal: abdomen soft, distended, tender in the right upper quadrant, abdominal hernia not detected  Genitourinary: inguinal hernia not detected  Musculoskeletal: gait normal, muscle mass normal, insignificant degree of tenderness along the vertebral spine  Neurological: awake and alert, seems to have reasonable capacity for understanding for medical decision making  Psychiatric: appears to have reasonable judgement, pleasant    Radiographic/Lab Findings: As above    IMPRESSION:  · Right upper quadrant fullness, discomfort.  · Daughter with colon cancer  · History of colonic hemangiomas  · Paroxysmal atrial fibrillation on aspirin  · Right renal mass requiring additional evaluation  · Fibromyalgia    PLAN:  · CT scan of abdomen and pelvis with oral and IV contrast with thin cuts through the kidney to evaluate for mass that was seen on ultrasound.  This will allow us to have a better look at the liver as well.  It will also help rule out diverticulitis no that is quite unlikely since she did not have diverticuli that were visualized on her last colonoscopy  · CCK HIDA scan.  I am " basing this on the fact that she felt better when she began to eat less and lost weight  · Colonoscopy both to evaluate the hepatic flexure as well and the need for screening with the daughter with colon cancer.    Lauren Reich MD  14:35 EST      In order to provide a more personal and interactive patient experience as well as improve efficiency, this note was started prior to the office visit.

## 2021-12-06 ENCOUNTER — HOSPITAL ENCOUNTER (OUTPATIENT)
Dept: ULTRASOUND IMAGING | Facility: HOSPITAL | Age: 76
Discharge: HOME | End: 2021-12-06
Admitting: FAMILY MEDICINE

## 2021-12-06 DIAGNOSIS — R10.11 RIGHT UPPER QUADRANT PAIN: ICD-10-CM

## 2021-12-06 PROCEDURE — 76705 ECHO EXAM OF ABDOMEN: CPT

## 2021-12-08 ENCOUNTER — OFFICE VISIT (OUTPATIENT)
Dept: SURGERY | Facility: CLINIC | Age: 76
End: 2021-12-08

## 2021-12-08 ENCOUNTER — PREP FOR SURGERY (OUTPATIENT)
Dept: OTHER | Facility: HOSPITAL | Age: 76
End: 2021-12-08

## 2021-12-08 VITALS — WEIGHT: 163 LBS | BODY MASS INDEX: 32 KG/M2 | HEIGHT: 60 IN

## 2021-12-08 DIAGNOSIS — Z12.11 ENCOUNTER FOR SCREENING COLONOSCOPY: Primary | ICD-10-CM

## 2021-12-08 DIAGNOSIS — N28.89 RIGHT RENAL MASS: ICD-10-CM

## 2021-12-08 DIAGNOSIS — R10.11 RIGHT UPPER QUADRANT PAIN: Primary | ICD-10-CM

## 2021-12-08 PROCEDURE — 99204 OFFICE O/P NEW MOD 45 MIN: CPT | Performed by: SURGERY

## 2021-12-08 RX ORDER — HYDROCHLOROTHIAZIDE 25 MG/1
25 TABLET ORAL DAILY
COMMUNITY
Start: 2021-09-07 | End: 2021-12-08

## 2021-12-08 RX ORDER — SODIUM CHLORIDE 0.9 % (FLUSH) 0.9 %
1-10 SYRINGE (ML) INJECTION AS NEEDED
Status: CANCELLED | OUTPATIENT
Start: 2022-01-31

## 2021-12-08 RX ORDER — SODIUM CHLORIDE 0.9 % (FLUSH) 0.9 %
3 SYRINGE (ML) INJECTION EVERY 12 HOURS SCHEDULED
Status: CANCELLED | OUTPATIENT
Start: 2022-01-31

## 2021-12-08 RX ORDER — VALSARTAN 160 MG/1
160 TABLET ORAL DAILY
COMMUNITY
Start: 2021-11-24

## 2021-12-08 RX ORDER — PRAVASTATIN SODIUM 20 MG
20 TABLET ORAL NIGHTLY
COMMUNITY
Start: 2021-11-10

## 2021-12-08 RX ORDER — DICYCLOMINE HCL 20 MG
20 TABLET ORAL EVERY 6 HOURS
COMMUNITY
Start: 2021-11-10 | End: 2021-12-08

## 2021-12-10 ENCOUNTER — TRANSCRIBE ORDERS (OUTPATIENT)
Dept: SURGERY | Facility: CLINIC | Age: 76
End: 2021-12-10

## 2021-12-10 DIAGNOSIS — R10.11 RIGHT UPPER QUADRANT PAIN: ICD-10-CM

## 2021-12-10 DIAGNOSIS — N28.89 RENAL MASS: Primary | ICD-10-CM

## 2022-01-13 ENCOUNTER — OFFICE VISIT (OUTPATIENT)
Dept: CARDIOLOGY | Facility: CLINIC | Age: 77
End: 2022-01-13

## 2022-01-13 VITALS
HEART RATE: 55 BPM | BODY MASS INDEX: 31.83 KG/M2 | SYSTOLIC BLOOD PRESSURE: 177 MMHG | DIASTOLIC BLOOD PRESSURE: 64 MMHG | HEIGHT: 60 IN

## 2022-01-13 DIAGNOSIS — I10 ESSENTIAL HYPERTENSION: ICD-10-CM

## 2022-01-13 DIAGNOSIS — I48.0 PAROXYSMAL ATRIAL FIBRILLATION: Primary | ICD-10-CM

## 2022-01-13 PROCEDURE — 99214 OFFICE O/P EST MOD 30 MIN: CPT | Performed by: NURSE PRACTITIONER

## 2022-01-13 PROCEDURE — 93000 ELECTROCARDIOGRAM COMPLETE: CPT | Performed by: NURSE PRACTITIONER

## 2022-01-13 NOTE — PROGRESS NOTES
Date of Office Visit: 2022  Encounter Provider: LESLIE Trimble  Place of Service: [facility] CARDIOLOGY  Patient Name: Sera Mccord  :1945    Chief Complaint   Patient presents with   • Atrial Fibrillation   :     HPI: Sera Mccord is a 76 y.o. female.  She is a patient of Dr. Linda's whom we follow for the management of hypertension and paroxysmal atrial fibrillation.  She was on Xarelto but could not afford it and did not want to take warfarin.       In , she underwent cardiac catheterization for exertional chest pain and an abnormal stress test.  She was diagnosed with Syndrome X.  She has been treated with Imdur, aspirin, and statin therapy.  She has had no recurrence of angina.  Her last echocardiogram was in  which revealed normal LV function and an estimated EF of 66%, mild mitral valve regurgitation, and no other significant valvular abnormalities.     She was last seen in the office by Dr. Linda in 2021 at which time she was doing well.  She reported 1 episode of atrial fibrillation in the last year.  Dr. Linda had a discussion with her regarding anticoagulation, but ultimately the decision was made to continue the aspirin.  She was advised to follow-up in 1 year.   From a cardiac standpoint, she has been doing well.  She denies any chest pain, shortness of breath, palpitations, edema, dizziness, or syncope.  Sadly, her mother passed away late last night.  Evidently she fell and broke her hip about 2 weeks ago and just never really bounced back.  Her blood pressure is high this morning.  However, it is normally very well controlled at home in the 120s and 130s systolic.    Past Medical History:   Diagnosis Date   • Abnormal finding on lung imaging    • Acid reflux    • Arthritis     DJD Multiple joints spine, shoulders, knees, left hip. 2013 normal joint examination. Patient has 6 positive fibromyalgia tender points.   •  Colon polyps    • Coronary artery disease     12/14 markedly abnl stress and nl cath; poss syndrome x  felt terrible with metoprolol   • Fibromyalgia    • Foot pain    • Hemorrhoids     internal and external   • Histoplasmosis    • Hyperlipidemia    • Hypertension    • IBS (irritable bowel syndrome)    • Insomnia     affected by burning in legs and also pain in right ear and occipit   • Lung mass    • Mediastinal lymphadenopathy    • Night sweat    • PAF (paroxysmal atrial fibrillation) (HCC)    • Plantar fasciitis     12/13 right side   • Restless leg syndrome    • Skin cancer    • Sore throat        Past Surgical History:   Procedure Laterality Date   • CARDIAC CATHETERIZATION  2015   • COLONOSCOPY N/A 10/24/2016    Procedure: COLONOSCOPY;  Surgeon: Lauren Reich MD;  Location: [facility] ENDOSCOPY;  Service:    • COLONOSCOPY W/ POLYPECTOMY  2013    Dr. Lauren Reich   • HYSTERECTOMY  1986   • SKIN CANCER EXCISION      nose   • UPPER GASTROINTESTINAL ENDOSCOPY  11/2015    Dr. Lauren Reich       Social History     Socioeconomic History   • Marital status:    Tobacco Use   • Smoking status: Never Smoker   • Smokeless tobacco: Never Used   • Tobacco comment: OCCASSIONAL CAFFEINE USE: CHOCOLATE ONLY   Vaping Use   • Vaping Use: Never used   Substance and Sexual Activity   • Alcohol use: No   • Drug use: No   • Sexual activity: Defer       Family History   Problem Relation Age of Onset   • Hypertension Mother    • Heart disease Mother    • Skin cancer Mother    • Heart disease Father    • Heart attack Father    • Skin cancer Maternal Grandmother    • No Known Problems Maternal Grandfather    • Arthritis Paternal Grandmother    • Heart disease Paternal Grandfather    • Ovarian cancer Maternal Aunt    • Breast cancer Neg Hx        Review of Systems   Constitutional: Negative.   Cardiovascular: Negative.  Negative for chest pain, dyspnea on exertion, leg swelling, orthopnea, paroxysmal nocturnal dyspnea and syncope.  "  Respiratory: Negative.    Hematologic/Lymphatic: Negative for bleeding problem.   Musculoskeletal: Negative for falls.   Gastrointestinal: Negative for melena.   Neurological: Negative for dizziness and light-headedness.       Allergies   Allergen Reactions   • Duloxetine      Leg swelling   • Lisinopril      Leg swelling   • Losartan Myalgia   • Mobic [Meloxicam] Other (See Comments) and GI Intolerance     increased heart rate and elevated blood pressure   • Pseudoephedrine Other (See Comments)     Patient states either caused leg swelling or GI intolerance   • Codeine Nausea And Vomiting   • Levofloxacin Swelling   • Sulfa Antibiotics Nausea And Vomiting         Current Outpatient Medications:   •  aspirin (aspirin) 81 MG EC tablet, Take 1 tablet by mouth Daily., Disp:  , Rfl:   •  Cholecalciferol (VITAMIN D3 PO), Take 1,000 Units by mouth Daily., Disp: , Rfl:   •  flecainide (TAMBOCOR) 50 MG tablet, Take 1 tablet by mouth 2 (Two) Times a Day., Disp: 180 tablet, Rfl: 2  •  pravastatin (PRAVACHOL) 20 MG tablet, Take 20 mg by mouth Daily., Disp: , Rfl:   •  valsartan (DIOVAN) 160 MG tablet, Take 160 mg by mouth Daily., Disp: , Rfl:       Objective:     Vitals:    01/13/22 0825   BP: 177/64   Pulse: 55   Height: 152.4 cm (60\")     Body mass index is 31.83 kg/m².    PHYSICAL EXAM:    Neck:      Vascular: No JVD.   Pulmonary:      Effort: Pulmonary effort is normal.      Breath sounds: Normal breath sounds.   Cardiovascular:      Normal rate. Regular rhythm.      Murmurs: There is no murmur.      No gallop. No click. No rub.   Pulses:     Intact distal pulses.           ECG 12 Lead    Date/Time: 1/13/2022 8:39 AM  Performed by: Selena Johnson APRN  Authorized by: Selena Johnson APRN   Comparison: compared with previous ECG from 1/12/2021  Similar to previous ECG  Rhythm: sinus rhythm  Rate: normal  BPM: 55  Conduction: incomplete right bundle branch block  Other findings: U wave    Clinical impression: " abnormal EKG  Comments: Indication: Paroxysmal atrial fibrillation              Assessment:       Diagnosis Plan   1. Paroxysmal atrial fibrillation (HCC)  ECG 12 Lead   2. Essential hypertension       Orders Placed This Encounter   Procedures   • ECG 12 Lead     This order was created via procedure documentation     Order Specific Question:   Release to patient     Answer:   Immediate          Plan:       1. Paroxysmal atrial fibrillation.  She is maintaining sinus rhythm with flecainide.  She is not on anticoagulation.  She was unable to afford Xarelto and she does want to take warfarin.  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  However, she is not interested.      2.   Hypertension.  Blood pressure this morning is likely related to the stress of her mother's passing.  Continue current regimen including the valsartan.      I think she is doing well.  She denies any angina or heart failure.  I am not making any changes today, and she will follow-up with Dr. Linda in 1 year.      As always, it has been a pleasure to participate in your patient's care.      Sincerely,         LESLIE Rivas

## 2022-01-28 ENCOUNTER — HOSPITAL ENCOUNTER (OUTPATIENT)
Dept: CT IMAGING | Facility: HOSPITAL | Age: 77
Discharge: HOME | End: 2022-01-28
Admitting: SURGERY

## 2022-01-28 ENCOUNTER — HOSPITAL ENCOUNTER (OUTPATIENT)
Dept: NUCLEAR MEDICINE | Facility: HOSPITAL | Age: 77
Discharge: HOME | End: 2022-01-28

## 2022-01-28 ENCOUNTER — APPOINTMENT (OUTPATIENT)
Dept: NUCLEAR MEDICINE | Facility: HOSPITAL | Age: 77
End: 2022-01-28

## 2022-01-28 DIAGNOSIS — R10.11 RIGHT UPPER QUADRANT PAIN: ICD-10-CM

## 2022-01-28 DIAGNOSIS — N28.89 RENAL MASS: ICD-10-CM

## 2022-01-28 DIAGNOSIS — N28.89 RIGHT RENAL MASS: ICD-10-CM

## 2022-01-28 LAB — CREAT BLDA-MCNC: 0.8 MG/DL (ref 0.6–1.3)

## 2022-01-28 PROCEDURE — 0 DIATRIZOATE MEGLUMINE & SODIUM PER 1 ML: Performed by: SURGERY

## 2022-01-28 PROCEDURE — 25010000002 SINCALIDE PER 5 MCG: Performed by: SURGERY

## 2022-01-28 PROCEDURE — 74178 CT ABD&PLV WO CNTR FLWD CNTR: CPT

## 2022-01-28 PROCEDURE — 82565 ASSAY OF CREATININE: CPT

## 2022-01-28 PROCEDURE — 0 TECHNETIUM TC 99M MEBROFENIN KIT: Performed by: SURGERY

## 2022-01-28 PROCEDURE — 78227 HEPATOBIL SYST IMAGE W/DRUG: CPT

## 2022-01-28 PROCEDURE — 25010000002 IOPAMIDOL 61 % SOLUTION: Performed by: SURGERY

## 2022-01-28 PROCEDURE — A9537 TC99M MEBROFENIN: HCPCS | Performed by: SURGERY

## 2022-01-28 RX ORDER — KIT FOR THE PREPARATION OF TECHNETIUM TC 99M MEBROFENIN 45 MG/10ML
1 INJECTION, POWDER, LYOPHILIZED, FOR SOLUTION INTRAVENOUS
Status: COMPLETED | OUTPATIENT
Start: 2022-01-28 | End: 2022-01-28

## 2022-01-28 RX ADMIN — SINCALIDE 1.5 MCG: 5 INJECTION, POWDER, LYOPHILIZED, FOR SOLUTION INTRAVENOUS at 09:09

## 2022-01-28 RX ADMIN — DIATRIZOATE MEGLUMINE AND DIATRIZOATE SODIUM 30 ML: 600; 100 SOLUTION ORAL; RECTAL at 10:35

## 2022-01-28 RX ADMIN — MEBROFENIN 1 DOSE: 45 INJECTION, POWDER, LYOPHILIZED, FOR SOLUTION INTRAVENOUS at 07:32

## 2022-01-28 RX ADMIN — IOPAMIDOL 85 ML: 612 INJECTION, SOLUTION INTRAVENOUS at 10:35

## 2022-01-31 ENCOUNTER — ANESTHESIA (OUTPATIENT)
Dept: GASTROENTEROLOGY | Facility: HOSPITAL | Age: 77
End: 2022-01-31

## 2022-01-31 ENCOUNTER — HOSPITAL ENCOUNTER (OUTPATIENT)
Facility: HOSPITAL | Age: 77
Setting detail: HOSPITAL OUTPATIENT SURGERY
Discharge: HOME | End: 2022-01-31
Attending: SURGERY | Admitting: SURGERY

## 2022-01-31 ENCOUNTER — ANESTHESIA EVENT (OUTPATIENT)
Dept: GASTROENTEROLOGY | Facility: HOSPITAL | Age: 77
End: 2022-01-31

## 2022-01-31 VITALS
DIASTOLIC BLOOD PRESSURE: 68 MMHG | WEIGHT: 157 LBS | SYSTOLIC BLOOD PRESSURE: 127 MMHG | BODY MASS INDEX: 25.23 KG/M2 | HEART RATE: 49 BPM | OXYGEN SATURATION: 97 % | HEIGHT: 66 IN | RESPIRATION RATE: 16 BRPM

## 2022-01-31 DIAGNOSIS — Z12.11 ENCOUNTER FOR SCREENING COLONOSCOPY: ICD-10-CM

## 2022-01-31 PROCEDURE — 45378 DIAGNOSTIC COLONOSCOPY: CPT | Performed by: SURGERY

## 2022-01-31 PROCEDURE — 25010000002 PROPOFOL 10 MG/ML EMULSION: Performed by: NURSE ANESTHETIST, CERTIFIED REGISTERED

## 2022-01-31 PROCEDURE — 25010000002 GLUCAGON (RDNA) PER 1 MG: Performed by: SURGERY

## 2022-01-31 RX ORDER — SODIUM CHLORIDE 0.9 % (FLUSH) 0.9 %
3 SYRINGE (ML) INJECTION EVERY 12 HOURS SCHEDULED
Status: DISCONTINUED | OUTPATIENT
Start: 2022-01-31 | End: 2022-01-31 | Stop reason: HOSPADM

## 2022-01-31 RX ORDER — PROPOFOL 10 MG/ML
VIAL (ML) INTRAVENOUS AS NEEDED
Status: DISCONTINUED | OUTPATIENT
Start: 2022-01-31 | End: 2022-01-31 | Stop reason: SURG

## 2022-01-31 RX ORDER — LIDOCAINE HYDROCHLORIDE 20 MG/ML
INJECTION, SOLUTION INFILTRATION; PERINEURAL AS NEEDED
Status: DISCONTINUED | OUTPATIENT
Start: 2022-01-31 | End: 2022-01-31 | Stop reason: SURG

## 2022-01-31 RX ORDER — SODIUM CHLORIDE 0.9 % (FLUSH) 0.9 %
1-10 SYRINGE (ML) INJECTION AS NEEDED
Status: DISCONTINUED | OUTPATIENT
Start: 2022-01-31 | End: 2022-01-31 | Stop reason: HOSPADM

## 2022-01-31 RX ORDER — PROPOFOL 10 MG/ML
VIAL (ML) INTRAVENOUS CONTINUOUS PRN
Status: DISCONTINUED | OUTPATIENT
Start: 2022-01-31 | End: 2022-01-31 | Stop reason: SURG

## 2022-01-31 RX ORDER — SODIUM CHLORIDE, SODIUM LACTATE, POTASSIUM CHLORIDE, CALCIUM CHLORIDE 600; 310; 30; 20 MG/100ML; MG/100ML; MG/100ML; MG/100ML
30 INJECTION, SOLUTION INTRAVENOUS CONTINUOUS PRN
Status: DISCONTINUED | OUTPATIENT
Start: 2022-01-31 | End: 2022-01-31 | Stop reason: HOSPADM

## 2022-01-31 RX ORDER — GLYCOPYRROLATE 0.2 MG/ML
INJECTION INTRAMUSCULAR; INTRAVENOUS AS NEEDED
Status: DISCONTINUED | OUTPATIENT
Start: 2022-01-31 | End: 2022-01-31 | Stop reason: SURG

## 2022-01-31 RX ADMIN — GLYCOPYRROLATE 0.2 MG: 0.2 INJECTION INTRAMUSCULAR; INTRAVENOUS at 11:33

## 2022-01-31 RX ADMIN — Medication 140 MCG/KG/MIN: at 11:33

## 2022-01-31 RX ADMIN — SODIUM CHLORIDE, POTASSIUM CHLORIDE, SODIUM LACTATE AND CALCIUM CHLORIDE 30 ML/HR: 600; 310; 30; 20 INJECTION, SOLUTION INTRAVENOUS at 10:36

## 2022-01-31 RX ADMIN — PROPOFOL 120 MG: 10 INJECTION, EMULSION INTRAVENOUS at 11:33

## 2022-01-31 RX ADMIN — LIDOCAINE HYDROCHLORIDE 60 MG: 20 INJECTION, SOLUTION INFILTRATION; PERINEURAL at 11:33

## 2022-01-31 NOTE — ANESTHESIA POSTPROCEDURE EVALUATION
"Patient: Sera Mccord    Procedure Summary     Date: 01/31/22 Room / Location:  [facility] /  [facility]    Anesthesia Start: 1124 Anesthesia Stop: 1149    Procedure: COLONOSCOPY to CECUM (N/A ) Diagnosis:       Encounter for screening colonoscopy      (Encounter for screening colonoscopy [Z12.11])    Surgeons: Lauren Reich MD Provider: Jonah Rhoades MD    Anesthesia Type: MAC ASA Status: 3          Anesthesia Type: MAC    Vitals  Vitals Value Taken Time   /68 01/31/22 1207   Temp     Pulse 49 01/31/22 1207   Resp 16 01/31/22 1207   SpO2 97 % 01/31/22 1207           Post Anesthesia Care and Evaluation    Patient location during evaluation: PACU  Patient participation: complete - patient participated  Level of consciousness: awake  Pain score: 0  Pain management: adequate  Airway patency: patent  Anesthetic complications: No anesthetic complications  PONV Status: none  Cardiovascular status: acceptable  Respiratory status: acceptable  Hydration status: acceptable    Comments: /68 (BP Location: Right arm, Patient Position: Sitting)   Pulse (!) 49   Resp 16   Ht 167.6 cm (66\")   Wt 71.2 kg (157 lb)   SpO2 97%   BMI 25.34 kg/m²       "

## 2022-01-31 NOTE — ANESTHESIA PREPROCEDURE EVALUATION
Anesthesia Evaluation     Patient summary reviewed and Nursing notes reviewed                Airway   Mallampati: I  TM distance: >3 FB  Neck ROM: full  No difficulty expected  Dental - normal exam     Pulmonary - negative pulmonary ROS and normal exam   Cardiovascular - normal exam    (+) hypertension, CAD, dysrhythmias Paroxysmal Atrial Fib, hyperlipidemia,       Neuro/Psych  (+) psychiatric history Anxiety,     GI/Hepatic/Renal/Endo    (+)  GERD,      Musculoskeletal     (+) neck pain,   Abdominal  - normal exam    Bowel sounds: normal.   Substance History - negative use     OB/GYN negative ob/gyn ROS         Other   arthritis,    history of cancer                    Anesthesia Plan    ASA 3     MAC       Anesthetic plan, all risks, benefits, and alternatives have been provided, discussed and informed consent has been obtained with: patient.        CODE STATUS:

## 2022-09-20 NOTE — DISCHARGE INSTRUCTIONS
Call Dr. Linda's office tomorrow.  If symptoms recur, take an extra half pill of flecainide as previously instructed by Dr. Linda.  Return to emergency department for difficult breathing, palpitations, dizziness, chest pain, elevated heart rate, or other concern.   Enbrel Counseling:  I discussed with the patient the risks of etanercept including but not limited to myelosuppression, immunosuppression, autoimmune hepatitis, demyelinating diseases, lymphoma, and infections.  The patient understands that monitoring is required including a PPD at baseline and must alert us or the primary physician if symptoms of infection or other concerning signs are noted.

## 2022-10-27 NOTE — PROGRESS NOTES
SURGERY  Sera RONNIE Mccord   1945  10/27/22    Chief Complaint:  Gallbladder and new flare of right upper quadrant pain    HPI    Ms. Mccord is a very nice 77 y.o. female who presents with another flare of her right upper quadrant pain.  Most recently, it could have been perpetrated by using the bars prior for multiple hours, but she continues to go back to those symptoms which is times are associated with eating.  Complaints of a fullness and pressure pulling feeling in the right upper quadrant that radiates around to her back.  It is particularly worse when she lays down at night.  It does seem to get worse with activity such as reaching but also again with eating in addition, she has noticed that with a decrease in eating and weight loss it has improved.  Complicating diagnosis is the fact that she has fibromyalgia.  She denies any sense of burning or reflux or any significant colon changes and says for the most part she feels like she can eat without any difficulty and is having bowel movements normally.  If she eats a banana, she will have right upper quadrant pain and get bloated and she has pain between her shoulder blades that could be musculoskeletal or gallbladder.     Her US GB was negative for stones, did show hepatic steatosis, and a lesion in the kidney which is indeterminate despite both US and CT chest, thus contrast enhanced CT renal recommended.  She had an UGI in 2015 showing a small hiatal hernia with GE reflux noted, moderate (Nicho).  After being seen in 2021 with similar problems, we got a repeat US with no stones, thickening or pericholecystic fluids, no biliary dilatation, and presence of hepatic steatosis.   CT 2021 showed no findings in the right upper quadrant, showing only a angiomyolipoma of the kidney, mild sigmoid diverticulosis with muscular hypertrophy of the sigmoid.  [facility] HIDA 2021 86% EF.  C scope 1/31/22 with few diverticula, transverse, few hemangiogmas (not AVM) in ascending  with follow up recommended 2027 based on daughter with colon cancer.  At the time of that c scope, her symptoms had improved fairly significantly, so we opted not to pursue lap chol, tho we felt her symptoms were suggestive enough that we could go ahead.      Dr. Singh has told her that she believes it is her gallbladder and she would benefit from removal of it.  In addition she does have pain between her shoulder blades at times.      Past Medical History:   Diagnosis Date   • Abnormal finding on lung imaging    • Acid reflux    • Arthritis     DJD Multiple joints spine, shoulders, knees, left hip. December 2013 normal joint examination. Patient has 6 positive fibromyalgia tender points.   • Colon polyps    • Coronary artery disease     12/14 markedly abnl stress and nl cath; poss syndrome x  felt terrible with metoprolol   • Fibromyalgia    • Foot pain    • Hemorrhoids     internal and external   • Histoplasmosis    • Hyperlipidemia    • Hypertension    • IBS (irritable bowel syndrome)    • Insomnia     affected by burning in legs and also pain in right ear and occipit   • Lung mass    • Mediastinal lymphadenopathy    • Night sweat    • PAF (paroxysmal atrial fibrillation) (HCC)    • Plantar fasciitis     12/13 right side   • Restless leg syndrome    • Skin cancer    • Sore throat      Past Surgical History:   Procedure Laterality Date   • CARDIAC CATHETERIZATION  2015   • COLONOSCOPY N/A 10/24/2016    Procedure: COLONOSCOPY;  Surgeon: Lauren Reich MD;  Location: [facility] ENDOSCOPY;  Service:    • COLONOSCOPY N/A 1/31/2022    Procedure: COLONOSCOPY to CECUM;  Surgeon: Lauren Reich MD;  Location: [facility] ENDOSCOPY;  Service: General;  Laterality: N/A;  SCREENING, ABD PAIN  --DIVERTICULOSIS   • COLONOSCOPY W/ POLYPECTOMY  2013    Dr. Lauren Reich   • HYSTERECTOMY  1986   • SKIN CANCER EXCISION      nose   • UPPER GASTROINTESTINAL ENDOSCOPY  11/2015    Dr. Lauren Reich     Family History   Problem Relation Age of  Onset   • Hypertension Mother    • Heart disease Mother    • Skin cancer Mother    • Heart disease Father    • Heart attack Father    • Skin cancer Maternal Grandmother    • No Known Problems Maternal Grandfather    • Arthritis Paternal Grandmother    • Heart disease Paternal Grandfather    • Ovarian cancer Maternal Aunt    • Breast cancer Neg Hx    • Malig Hyperthermia Neg Hx      Social History     Socioeconomic History   • Marital status:    Tobacco Use   • Smoking status: Never   • Smokeless tobacco: Never   • Tobacco comments:     OCCASSIONAL CAFFEINE USE: CHOCOLATE ONLY   Vaping Use   • Vaping Use: Never used   Substance and Sexual Activity   • Alcohol use: No   • Drug use: No   • Sexual activity: Defer         Current Outpatient Medications:   •  aspirin (aspirin) 81 MG EC tablet, Take 1 tablet by mouth Daily., Disp:  , Rfl:   •  Cholecalciferol (VITAMIN D3 PO), Take 1,000 Units by mouth Daily., Disp: , Rfl:   •  flecainide (TAMBOCOR) 50 MG tablet, Take 1 tablet by mouth 2 (Two) Times a Day., Disp: 180 tablet, Rfl: 2  •  pravastatin (PRAVACHOL) 20 MG tablet, Take 20 mg by mouth Daily., Disp: , Rfl:   •  valsartan (DIOVAN) 160 MG tablet, Take 160 mg by mouth Daily., Disp: , Rfl:     Allergies   Allergen Reactions   • Duloxetine      Leg swelling   • Lisinopril      Leg swelling   • Losartan Myalgia   • Mobic [Meloxicam] Other (See Comments) and GI Intolerance     increased heart rate and elevated blood pressure   • Morphine Nausea And Vomiting   • Pseudoephedrine Other (See Comments)     Patient states either caused leg swelling or GI intolerance   • Codeine Nausea And Vomiting   • Levofloxacin Swelling   • Sulfa Antibiotics Nausea And Vomiting     There were no vitals filed for this visit.    PHYSICAL EXAM:    There were no vitals taken for this visit.  There is no height or weight on file to calculate BMI.    Constitutional: well developed, well nourished, appears  healthy, stated age  ENMT: Hearing  intact, neck without masses  CVS: RRR, no murmur  Respiratory: CTA, normal respiratory effort   Gastrointestinal: abdomen soft, distended, tender in the right upper quadrant, abdominal hernia not detected  Genitourinary: inguinal hernia not detected  Musculoskeletal: gait normal, muscle mass normal, insignificant degree of tenderness along the vertebral spine  Neurological: awake and alert, seems to have reasonable capacity for understanding for medical decision making  Psychiatric: appears to have reasonable judgement, pleasant    Radiographic/Lab Findings: As above  CT NORMAL GALLBLADDER        US GB      IMPRESSION:  · Right upper quadrant fullness, discomfort, recurring, long-term.  · Daughter with colon cancer  · History of colonic hemangiomas  · Paroxysmal atrial fibrillation on aspirin  · Right renal mass, 7 mm angiomyolipoma.   · Fibromyalgia  · Irritable bowel with constipation    PLAN:  · Lap chol.  i've told her that i can't say that she definitively that taking her gallbladder out will stop her problems, but that her symptoms are recurrent and so it is reasonable to proceed, considering all aspects.  · Likely will be helped with constipation with the lap chol.  Discussed potential of post op diarrhea.  · We discussed fibromylagia and how activity helps it.  We discussed how no narcotics will be effective.  · A fib, paroxysmal, on tambocor      Lauren Reich MD  14:35 EST      In order to provide a more personal and interactive patient experience as well as improve efficiency, this note was started prior to the office visit.

## 2022-10-31 ENCOUNTER — OFFICE VISIT (OUTPATIENT)
Dept: SURGERY | Facility: CLINIC | Age: 77
End: 2022-10-31

## 2022-10-31 ENCOUNTER — PREP FOR SURGERY (OUTPATIENT)
Dept: OTHER | Facility: HOSPITAL | Age: 77
End: 2022-10-31

## 2022-10-31 VITALS — HEIGHT: 66 IN | WEIGHT: 168 LBS | BODY MASS INDEX: 27 KG/M2

## 2022-10-31 DIAGNOSIS — R10.11 RIGHT UPPER QUADRANT PAIN: Primary | ICD-10-CM

## 2022-10-31 PROCEDURE — 99214 OFFICE O/P EST MOD 30 MIN: CPT | Performed by: SURGERY

## 2022-10-31 RX ORDER — SODIUM CHLORIDE 0.9 % (FLUSH) 0.9 %
10 SYRINGE (ML) INJECTION EVERY 12 HOURS SCHEDULED
Status: CANCELLED | OUTPATIENT
Start: 2022-12-02

## 2022-10-31 RX ORDER — CEFAZOLIN SODIUM 2 G/100ML
2 INJECTION, SOLUTION INTRAVENOUS ONCE
Status: CANCELLED | OUTPATIENT
Start: 2022-12-02 | End: 2022-10-31

## 2022-10-31 RX ORDER — ACETAMINOPHEN 500 MG
1000 TABLET ORAL ONCE
Status: CANCELLED | OUTPATIENT
Start: 2022-12-02 | End: 2022-10-31

## 2022-10-31 RX ORDER — ONDANSETRON 2 MG/ML
4 INJECTION INTRAMUSCULAR; INTRAVENOUS EVERY 6 HOURS PRN
Status: CANCELLED | OUTPATIENT
Start: 2022-10-31

## 2022-10-31 RX ORDER — SODIUM CHLORIDE 0.9 % (FLUSH) 0.9 %
10 SYRINGE (ML) INJECTION AS NEEDED
Status: CANCELLED | OUTPATIENT
Start: 2022-12-02

## 2022-10-31 RX ORDER — OXYCODONE HCL 10 MG/1
10 TABLET, FILM COATED, EXTENDED RELEASE ORAL ONCE
Status: CANCELLED | OUTPATIENT
Start: 2022-12-02 | End: 2022-10-31

## 2022-11-22 ENCOUNTER — PRE-ADMISSION TESTING (OUTPATIENT)
Dept: PREADMISSION TESTING | Facility: HOSPITAL | Age: 77
End: 2022-11-22

## 2022-11-22 ENCOUNTER — TELEPHONE (OUTPATIENT)
Dept: CARDIOLOGY | Facility: CLINIC | Age: 77
End: 2022-11-22

## 2022-11-22 VITALS
SYSTOLIC BLOOD PRESSURE: 168 MMHG | TEMPERATURE: 98.7 F | HEART RATE: 53 BPM | HEIGHT: 60 IN | RESPIRATION RATE: 16 BRPM | OXYGEN SATURATION: 99 % | WEIGHT: 166.8 LBS | BODY MASS INDEX: 32.75 KG/M2 | DIASTOLIC BLOOD PRESSURE: 75 MMHG

## 2022-11-22 DIAGNOSIS — R10.11 RIGHT UPPER QUADRANT PAIN: ICD-10-CM

## 2022-11-22 LAB
ALBUMIN SERPL-MCNC: 4.2 G/DL (ref 3.5–5.2)
ALBUMIN/GLOB SERPL: 1.8 G/DL
ALP SERPL-CCNC: 62 U/L (ref 39–117)
ALT SERPL W P-5'-P-CCNC: 11 U/L (ref 1–33)
ANION GAP SERPL CALCULATED.3IONS-SCNC: 5.8 MMOL/L (ref 5–15)
AST SERPL-CCNC: 18 U/L (ref 1–32)
BILIRUB SERPL-MCNC: 0.4 MG/DL (ref 0–1.2)
BUN SERPL-MCNC: 12 MG/DL (ref 8–23)
BUN/CREAT SERPL: 14 (ref 7–25)
CALCIUM SPEC-SCNC: 9.4 MG/DL (ref 8.6–10.5)
CHLORIDE SERPL-SCNC: 106 MMOL/L (ref 98–107)
CO2 SERPL-SCNC: 30.2 MMOL/L (ref 22–29)
CREAT SERPL-MCNC: 0.86 MG/DL (ref 0.57–1)
DEPRECATED RDW RBC AUTO: 46 FL (ref 37–54)
EGFRCR SERPLBLD CKD-EPI 2021: 70.6 ML/MIN/1.73
ERYTHROCYTE [DISTWIDTH] IN BLOOD BY AUTOMATED COUNT: 12.9 % (ref 12.3–15.4)
GLOBULIN UR ELPH-MCNC: 2.4 GM/DL
GLUCOSE SERPL-MCNC: 103 MG/DL (ref 65–99)
HCT VFR BLD AUTO: 42.1 % (ref 34–46.6)
HGB BLD-MCNC: 13.9 G/DL (ref 12–15.9)
MCH RBC QN AUTO: 31.7 PG (ref 26.6–33)
MCHC RBC AUTO-ENTMCNC: 33 G/DL (ref 31.5–35.7)
MCV RBC AUTO: 95.9 FL (ref 79–97)
PLATELET # BLD AUTO: 222 10*3/MM3 (ref 140–450)
PMV BLD AUTO: 10.6 FL (ref 6–12)
POTASSIUM SERPL-SCNC: 4.7 MMOL/L (ref 3.5–5.2)
PROT SERPL-MCNC: 6.6 G/DL (ref 6–8.5)
QT INTERVAL: 473 MS
RBC # BLD AUTO: 4.39 10*6/MM3 (ref 3.77–5.28)
SODIUM SERPL-SCNC: 142 MMOL/L (ref 136–145)
WBC NRBC COR # BLD: 4.72 10*3/MM3 (ref 3.4–10.8)

## 2022-11-22 PROCEDURE — 80053 COMPREHEN METABOLIC PANEL: CPT

## 2022-11-22 PROCEDURE — 93010 ELECTROCARDIOGRAM REPORT: CPT | Performed by: INTERNAL MEDICINE

## 2022-11-22 PROCEDURE — 36415 COLL VENOUS BLD VENIPUNCTURE: CPT

## 2022-11-22 PROCEDURE — 93005 ELECTROCARDIOGRAM TRACING: CPT

## 2022-11-22 PROCEDURE — 85027 COMPLETE CBC AUTOMATED: CPT

## 2022-11-22 NOTE — DISCHARGE INSTRUCTIONS
Take the following medications the morning of surgery:  FLECAINIDE    (CALL DR JOHNSON'S OFFICE REGARDING ASPIRIN, IF OKAY TO STOP FOR 7 DAYS PRIOR TO SURGERY, AS DIRECTED BY SURGEON)      If you are on prescription narcotic pain medication to control your pain you may also take that medication the morning of surgery.    General Instructions:  Do not eat solid food after midnight the night before surgery.  You may drink clear liquids day of surgery but must stop at least one hour before your hospital arrival time.  It is beneficial for you to have a clear drink that contains carbohydrates the day of surgery.  We suggest a 12 to 20 ounce bottle of Gatorade or Powerade for non-diabetic patients or a 12 to 20 ounce bottle of G2 or Powerade Zero for diabetic patients. (Pediatric patients, are not advised to drink a 12 to 20 ounce carbohydrate drink)    Clear liquids are liquids you can see through.  Nothing red in color.     Plain water                               Sports drinks  Sodas                                   Gelatin (Jell-O)  Fruit juices without pulp such as white grape juice and apple juice  Popsicles that contain no fruit or yogurt  Tea or coffee (no cream or milk added)  Gatorade / Powerade  G2 / Powerade Zero    Infants may have breast milk up to four hours before surgery.  Infants drinking formula may drink formula up to six hours before surgery.   Patients who avoid smoking, chewing tobacco and alcohol for 4 weeks prior to surgery have a reduced risk of post-operative complications.  Quit smoking as many days before surgery as you can.  Do not smoke, use chewing tobacco or drink alcohol the day of surgery.   If applicable bring your C-PAP/ BI-PAP machine.  Bring any papers given to you in the doctor’s office.  Wear clean comfortable clothes.  Do not wear contact lenses, false eyelashes or make-up.  Bring a case for your glasses.   Bring crutches or walker if applicable.  Remove all piercings.  Leave  jewelry and any other valuables at home.  Hair extensions with metal clips must be removed prior to surgery.  The Pre-Admission Testing nurse will instruct you to bring medications if unable to obtain an accurate list in Pre-Admission Testing.          Preventing a Surgical Site Infection:  For 2 to 3 days before surgery, avoid shaving with a razor because the razor can irritate skin and make it easier to develop an infection.    Any areas of open skin can increase the risk of a post-operative wound infection by allowing bacteria to enter and travel throughout the body.  Notify your surgeon if you have any skin wounds / rashes even if it is not near the expected surgical site.  The area will need assessed to determine if surgery should be delayed until it is healed.  The night prior to surgery shower using a fresh bar of anti-bacterial soap (such as Dial) and clean washcloth.  Sleep in a clean bed with clean clothing.  Do not allow pets to sleep with you.  Shower on the morning of surgery using a fresh bar of anti-bacterial soap (such as Dial) and clean washcloth.  Dry with a clean towel and dress in clean clothing.  Ask your surgeon if you will be receiving antibiotics prior to surgery.  Make sure you, your family, and all healthcare providers clean their hands with soap and water or an alcohol based hand  before caring for you or your wound.    Day of surgery:  Your arrival time is approximately two hours before your scheduled surgery time.  Upon arrival, a Pre-op nurse and Anesthesiologist will review your health history, obtain vital signs, and answer questions you may have.  The only belongings needed at this time will be a list of your home medications and if applicable your C-PAP/BI-PAP machine.  A Pre-op nurse will start an IV and you may receive medication in preparation for surgery, including something to help you relax.     Please be aware that surgery does come with discomfort.  We want to make  every effort to control your discomfort so please discuss any uncontrolled symptoms with your nurse.   Your doctor will most likely have prescribed pain medications.      If you are going home after surgery you will receive individualized written care instructions before being discharged.  A responsible adult must drive you to and from the hospital on the day of your surgery and stay with you for 24 hours.  Discharge prescriptions can be filled by the hospital pharmacy during regular pharmacy hours.  If you are having surgery late in the day/evening your prescription may be e-prescribed to your pharmacy.  Please verify your pharmacy hours or chose a 24 hour pharmacy to avoid not having access to your prescription because your pharmacy has closed for the day.    If you are staying overnight following surgery, you will be transported to your hospital room following the recovery period.  [facility] has all private rooms.    If you have any questions please call Pre-Admission Testing at [phone].  Deductibles and co-payments are collected on the day of service. Please be prepared to pay the required co-pay, deductible or deposit on the day of service as defined by your plan.    Call your surgeon immediately if you experience any of the following symptoms:  Sore Throat  Shortness of Breath or difficulty breathing  Cough  Chills  Body soreness or muscle pain  Headache  Fever  New loss of taste or smell  Do not arrive for your surgery ill.  Your procedure will need to be rescheduled to another time.  You will need to call your physician before the day of surgery to avoid any unnecessary exposure to hospital staff as well as other patients.       CHLORHEXIDINE CLOTH INSTRUCTIONS  The morning of surgery follow these instructions using the Chlorhexidine cloths you've been given.  These steps reduce bacteria on the body.  Do not use the cloths near your eyes, ears mouth, genitalia or on open wounds.  Throw  the cloths away after use but do not try to flush them down a toilet.      Open and remove one cloth at a time from the package.    Leave the cloth unfolded and begin the bathing.  Massage the skin with the cloths using gentle pressure to remove bacteria.  Do not scrub harshly.   Follow the steps below with one 2% CHG cloth per area (6 total cloths).  One cloth for neck, shoulders and chest.  One cloth for both arms, hands, fingers and underarms (do underarms last).  One cloth for the abdomen followed by groin.  One cloth for right leg and foot including between the toes.  One cloth for left leg and foot including between the toes.  The last cloth is to be used for the back of the neck, back and buttocks.    Allow the CHG to air dry 3 minutes on the skin which will give it time to work and decrease the chance of irritation.  The skin may feel sticky until it is dry.  Do not rinse with water or any other liquid or you will lose the beneficial effects of the CHG.  If mild skin irritation occurs, do rinse the skin to remove the CHG.  Report this to the nurse at time of admission.  Do not apply lotions, creams, ointments, deodorants or perfumes after using the clothes. Dress in clean clothes before coming to the hospital.

## 2022-11-22 NOTE — TELEPHONE ENCOUNTER
Patient calling will be having her gallbladder removed on Dec 2, 2022. The doctor would like her to come off her Aspirin.    [phone]

## 2022-11-28 ENCOUNTER — TELEPHONE (OUTPATIENT)
Dept: SURGERY | Facility: CLINIC | Age: 77
End: 2022-11-28

## 2022-11-28 NOTE — TELEPHONE ENCOUNTER
The patient called and cancelled her surgery with Dr. Reich on 12/2/22. She did not want to reschedule at this time.

## 2023-01-20 ENCOUNTER — OFFICE VISIT (OUTPATIENT)
Dept: CARDIOLOGY | Facility: CLINIC | Age: 78
End: 2023-01-20
Payer: MEDICARE

## 2023-01-20 VITALS
HEART RATE: 43 BPM | BODY MASS INDEX: 32.86 KG/M2 | HEIGHT: 60 IN | WEIGHT: 167.4 LBS | DIASTOLIC BLOOD PRESSURE: 86 MMHG | SYSTOLIC BLOOD PRESSURE: 166 MMHG

## 2023-01-20 DIAGNOSIS — I48.0 PAROXYSMAL ATRIAL FIBRILLATION: Primary | ICD-10-CM

## 2023-01-20 PROCEDURE — 99214 OFFICE O/P EST MOD 30 MIN: CPT | Performed by: INTERNAL MEDICINE

## 2023-01-20 PROCEDURE — 93000 ELECTROCARDIOGRAM COMPLETE: CPT | Performed by: INTERNAL MEDICINE

## 2023-01-20 NOTE — PROGRESS NOTES
"Date of Office Visit: 23  Encounter Provider: Mango Linda MD  Place of Service: [facility] CARDIOLOGY  Patient Name: Sera Mccord  :1945  [id number]    Chief Complaint   Patient presents with   • Coronary Artery Disease   :     HPI: Sera Mccord is a 77 y.o. female   she has had a history of paroxysmal A. fib that we have managed with flecainide she has had normal LV function normal stress test in the past.  She is has a history of hypertension.  We had her on Xarelto but she could not afford it and she does not want to take warfarin so she has been on a full aspirin a day.  She is here for follow-up and has been doing well may have had 1 episode of A. fib but did get really faster heart rates in the 80s.  She has not had any other problems blood pressures are better at home than they are here she says they always go up when we are in the office    Past Medical History:   Diagnosis Date   • Abnormal finding on lung imaging    • Acid reflux    • Allergies    • Arthritis     DJD Multiple joints spine, shoulders, knees, left hip. 2013 normal joint examination. Patient has 6 positive fibromyalgia tender points.   • Atrial fibrillation (HCC)    • Bloated abdomen    • Bradycardia     \"LOW RESTING HEART RATE BETWEEN 45-50\"   • Colon polyps    • Coronary artery disease      markedly abnl stress and nl cath; poss syndrome x  felt terrible with metoprolol   • Fibromyalgia    • Foot pain    • Hemorrhoids     internal and external   • Histoplasmosis    • History of 2019 novel coronavirus disease (COVID-19) 2022   • Hyperlipidemia    • Hypertension    • IBS (irritable bowel syndrome)    • Insomnia    • Lung mass    • Mediastinal lymphadenopathy     HX   • Night sweat    • Other disorders of bone development and growth, unspecified site     \"TERE, BONE 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\"   • PAF (paroxysmal atrial fibrillation) (HCC)    • Plantar fasciitis      right side   • " PONV (postoperative nausea and vomiting)    • Restless leg syndrome    • RUQ abdominal pain    • Skin cancer     HX       Past Surgical History:   Procedure Laterality Date   • CARDIAC CATHETERIZATION  2015   • COLONOSCOPY N/A 10/24/2016    Procedure: COLONOSCOPY;  Surgeon: Lauren Reich MD;  Location:  [facility];  Service:    • COLONOSCOPY N/A 01/31/2022    Procedure: COLONOSCOPY to CECUM;  Surgeon: Lauren Reich MD;  Location:  [facility];  Service: General;  Laterality: N/A;  SCREENING, ABD PAIN  --DIVERTICULOSIS   • COLONOSCOPY W/ POLYPECTOMY  2013    Dr. Lauren Reich   • HYSTERECTOMY  1986   • SKIN CANCER EXCISION      nose   • TONSILLECTOMY     • UPPER GASTROINTESTINAL ENDOSCOPY  11/2015    Dr. Lauren Reich       Social History     Socioeconomic History   • Marital status:    Tobacco Use   • Smoking status: Never   • Smokeless tobacco: Never   • Tobacco comments:     OCCASSIONAL CAFFEINE USE: CHOCOLATE ONLY   Vaping Use   • Vaping Use: Never used   Substance and Sexual Activity   • Alcohol use: No   • Drug use: No   • Sexual activity: Defer       Family History   Problem Relation Age of Onset   • Hypertension Mother    • Heart disease Mother    • Skin cancer Mother    • Heart disease Father    • Heart attack Father    • Skin cancer Maternal Grandmother    • No Known Problems Maternal Grandfather    • Arthritis Paternal Grandmother    • Heart disease Paternal Grandfather    • Ovarian cancer Maternal Aunt    • Breast cancer Neg Hx    • Malig Hyperthermia Neg Hx        Review of Systems   Constitutional: Negative for decreased appetite, fever, malaise/fatigue and weight loss.   HENT: Negative for nosebleeds.    Eyes: Negative for double vision.   Cardiovascular: Negative for chest pain, claudication, cyanosis, dyspnea on exertion, irregular heartbeat, leg swelling, near-syncope, orthopnea, palpitations, paroxysmal nocturnal dyspnea and syncope.   Respiratory: Negative for cough, hemoptysis  "and shortness of breath.    Hematologic/Lymphatic: Negative for bleeding problem.   Skin: Negative for rash.   Musculoskeletal: Negative for falls and myalgias.   Gastrointestinal: Negative for hematochezia, jaundice, melena, nausea and vomiting.   Genitourinary: Negative for hematuria.   Neurological: Negative for dizziness and seizures.   Psychiatric/Behavioral: Negative for altered mental status and memory loss.       Allergies   Allergen Reactions   • Duloxetine      Leg swelling   • Lisinopril      Leg swelling   • Losartan Myalgia   • Mobic [Meloxicam] Other (See Comments) and GI Intolerance     increased heart rate and elevated blood pressure   • Morphine Nausea And Vomiting   • Pseudoephedrine Other (See Comments)     Patient states either caused leg swelling or GI intolerance   • Codeine Nausea And Vomiting   • Levofloxacin Swelling   • Sulfa Antibiotics Nausea And Vomiting         Current Outpatient Medications:   •  aspirin (aspirin) 81 MG EC tablet, Take 1 tablet by mouth Daily. (Patient taking differently: Take 81 mg by mouth Daily. TO HOLD 1 WEEK BEFORE SURGERY PER DR MONTIEL (ADVISED PT CONTACT CARDIOLOGY REGARDING HOLDING PRIOR TO SURGERY)), Disp:  , Rfl:   •  Cholecalciferol (VITAMIN D3 PO), Take 1,000 Units by mouth Daily., Disp: , Rfl:   •  flecainide (TAMBOCOR) 50 MG tablet, Take 1 tablet by mouth 2 (Two) Times a Day., Disp: 180 tablet, Rfl: 2  •  pravastatin (PRAVACHOL) 20 MG tablet, Take 20 mg by mouth Every Night., Disp: , Rfl:   •  valsartan (DIOVAN) 160 MG tablet, Take 160 mg by mouth Daily., Disp: , Rfl:   •  Chlorhexidine Gluconate 2 % pads, Apply  topically. As directed pre op, Disp: , Rfl:       Objective:     Vitals:    01/20/23 1131   BP: 166/86   Pulse: (!) 43   Weight: 75.9 kg (167 lb 6.4 oz)   Height: 152.4 cm (60\")     Body mass index is 32.69 kg/m².    Constitutional:       Appearance: Well-developed.   Eyes:      General: No scleral icterus.  HENT:      Head: Normocephalic. "   Neck:      Thyroid: No thyromegaly.      Vascular: No JVD.      Lymphadenopathy: No cervical adenopathy.   Pulmonary:      Effort: Pulmonary effort is normal.      Breath sounds: Normal breath sounds. No wheezing. No rales.   Cardiovascular:      Normal rate. Regular rhythm.      No gallop.   Edema:     Peripheral edema absent.   Abdominal:      Palpations: Abdomen is soft.      Tenderness: There is no abdominal tenderness.   Musculoskeletal: Normal range of motion. Skin:     General: Skin is warm and dry.      Findings: No rash.   Neurological:      Mental Status: Alert and oriented to person, place, and time.           ECG 12 Lead    Date/Time: 1/20/2023 12:20 PM  Performed by: Mango Linda MD  Authorized by: Mango Linda MD   Comparison: compared with previous ECG   Similar to previous ECG  Rhythm: sinus bradycardia    Clinical impression: normal ECG             Assessment:       Diagnosis Plan   1. Paroxysmal atrial fibrillation (HCC)               Plan:       I think she is doing okay we have had a conversation in the past about anticoagulation she does not want to take that we have not had her on any AV fabio blocking agents because resting heart rates pretty low I am going to not make any changes I will have her come back and see Selena in a year of note she seems to have whitecoat hypertension because her blood pressure is better when she is at home    No follow-ups on file.     As always, it has been a pleasure to participate in your patient's care.      Sincerely,       Mango Linda MD

## 2024-01-23 ENCOUNTER — OFFICE VISIT (OUTPATIENT)
Dept: CARDIOLOGY | Facility: CLINIC | Age: 79
End: 2024-01-23
Payer: MEDICARE

## 2024-01-23 ENCOUNTER — HOSPITAL ENCOUNTER (OUTPATIENT)
Dept: CARDIOLOGY | Facility: HOSPITAL | Age: 79
Discharge: HOME | End: 2024-01-23
Admitting: NURSE PRACTITIONER
Payer: MEDICARE

## 2024-01-23 VITALS
BODY MASS INDEX: 32 KG/M2 | DIASTOLIC BLOOD PRESSURE: 76 MMHG | WEIGHT: 163 LBS | SYSTOLIC BLOOD PRESSURE: 130 MMHG | HEIGHT: 60 IN | HEART RATE: 63 BPM

## 2024-01-23 DIAGNOSIS — I10 ESSENTIAL HYPERTENSION: ICD-10-CM

## 2024-01-23 DIAGNOSIS — I48.0 PAROXYSMAL ATRIAL FIBRILLATION: ICD-10-CM

## 2024-01-23 DIAGNOSIS — I48.0 PAROXYSMAL ATRIAL FIBRILLATION: Primary | ICD-10-CM

## 2024-01-23 LAB
ALBUMIN SERPL-MCNC: 4.5 G/DL (ref 3.5–5.2)
ALBUMIN/GLOB SERPL: 1.8 G/DL
ALP SERPL-CCNC: 73 U/L (ref 39–117)
ALT SERPL W P-5'-P-CCNC: 18 U/L (ref 1–33)
ANION GAP SERPL CALCULATED.3IONS-SCNC: 11.7 MMOL/L (ref 5–15)
AST SERPL-CCNC: 22 U/L (ref 1–32)
BILIRUB SERPL-MCNC: 0.5 MG/DL (ref 0–1.2)
BUN SERPL-MCNC: 15 MG/DL (ref 8–23)
BUN/CREAT SERPL: 16 (ref 7–25)
CALCIUM SPEC-SCNC: 9.6 MG/DL (ref 8.6–10.5)
CHLORIDE SERPL-SCNC: 103 MMOL/L (ref 98–107)
CO2 SERPL-SCNC: 26.3 MMOL/L (ref 22–29)
CREAT SERPL-MCNC: 0.94 MG/DL (ref 0.57–1)
EGFRCR SERPLBLD CKD-EPI 2021: 63 ML/MIN/1.73
GLOBULIN UR ELPH-MCNC: 2.5 GM/DL
GLUCOSE SERPL-MCNC: 101 MG/DL (ref 65–99)
MAGNESIUM SERPL-MCNC: 2.4 MG/DL (ref 1.6–2.4)
POTASSIUM SERPL-SCNC: 4.3 MMOL/L (ref 3.5–5.2)
PROT SERPL-MCNC: 7 G/DL (ref 6–8.5)
SODIUM SERPL-SCNC: 141 MMOL/L (ref 136–145)

## 2024-01-23 PROCEDURE — 1159F MED LIST DOCD IN RCRD: CPT | Performed by: NURSE PRACTITIONER

## 2024-01-23 PROCEDURE — 1160F RVW MEDS BY RX/DR IN RCRD: CPT | Performed by: NURSE PRACTITIONER

## 2024-01-23 PROCEDURE — 93000 ELECTROCARDIOGRAM COMPLETE: CPT | Performed by: NURSE PRACTITIONER

## 2024-01-23 PROCEDURE — 80053 COMPREHEN METABOLIC PANEL: CPT | Performed by: NURSE PRACTITIONER

## 2024-01-23 PROCEDURE — 3075F SYST BP GE 130 - 139MM HG: CPT | Performed by: NURSE PRACTITIONER

## 2024-01-23 PROCEDURE — 3078F DIAST BP <80 MM HG: CPT | Performed by: NURSE PRACTITIONER

## 2024-01-23 PROCEDURE — 83735 ASSAY OF MAGNESIUM: CPT | Performed by: NURSE PRACTITIONER

## 2024-01-23 PROCEDURE — 99214 OFFICE O/P EST MOD 30 MIN: CPT | Performed by: NURSE PRACTITIONER

## 2024-01-23 PROCEDURE — 36415 COLL VENOUS BLD VENIPUNCTURE: CPT

## 2024-01-23 NOTE — PROGRESS NOTES
"  Date of Office Visit: 2024  Encounter Provider: LESLIE Trimble  Place of Service: [facility] CARDIOLOGY  Patient Name: Sera Mccord  :1945    Chief Complaint   Patient presents with    Atrial Fibrillation   :     HPI: Sera Mccord is a 78 y.o. female patient of Dr. Lnida's with hypertension and paroxysmal atrial fibrillation.  At one point she was on Xarelto.  However, she could not afford it and declined warfarin.  As such, she has been on aspirin.  She has maintained sinus rhythm with flecainide.  Due to a low resting heart rate, she has not been on AV fabio blocking agents.    She was last seen in the office by Dr. Linda in 2023 at which time she was doing well.  No changes were made to her regimen, and she was advised to follow-up in 1 year.    Overall, she has been doing well.  She denies any chest pain, shortness of breath, palpitations, edema, dizziness, or syncope.  She has chronic pain from arthritis and fibromyalgia.  She went sledding during the snow the other day.  It sounds like she got a little overheated and ended up vomiting.  She denies any recurrence of this feeling.  .  Past Medical History:   Diagnosis Date    Abnormal finding on lung imaging     Acid reflux     Allergies     Arthritis     DJD Multiple joints spine, shoulders, knees, left hip. 2013 normal joint examination. Patient has 6 positive fibromyalgia tender points.    Atrial fibrillation     Bloated abdomen     Bradycardia     \"LOW RESTING HEART RATE BETWEEN 45-50\"    Colon polyps     Coronary artery disease      markedly abnl stress and nl cath; poss syndrome x  felt terrible with metoprolol    Fibromyalgia     Foot pain     Hemorrhoids     internal and external    Histoplasmosis     History of 2019 novel coronavirus disease (COVID-19) 2022    Hyperlipidemia     Hypertension     IBS (irritable bowel syndrome)     Insomnia     Lung mass     Mediastinal " "lymphadenopathy     HX    Medicare annual wellness visit, subsequent 05/15/2017    Night sweat     Other disorders of bone development and growth, unspecified site     \"TERE, BONE 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\"    PAF (paroxysmal atrial fibrillation)     Plantar fasciitis     12/13 right side    PONV (postoperative nausea and vomiting)     Restless leg syndrome     RUQ abdominal pain     Skin cancer     HX       Past Surgical History:   Procedure Laterality Date    CARDIAC CATHETERIZATION  2015    COLONOSCOPY N/A 10/24/2016    Procedure: COLONOSCOPY;  Surgeon: Lauren Reich MD;  Location:  [facility];  Service:     COLONOSCOPY N/A 01/31/2022    Procedure: COLONOSCOPY to CECUM;  Surgeon: Lauren Reich MD;  Location:  [facility];  Service: General;  Laterality: N/A;  SCREENING, ABD PAIN  --DIVERTICULOSIS    COLONOSCOPY W/ POLYPECTOMY  2013    Dr. Lauren Reich    HYSTERECTOMY  1986    SKIN CANCER EXCISION      nose    TONSILLECTOMY      UPPER GASTROINTESTINAL ENDOSCOPY  11/2015    Dr. Lauren Reich       Social History     Socioeconomic History    Marital status:    Tobacco Use    Smoking status: Never     Passive exposure: Never    Smokeless tobacco: Never    Tobacco comments:     OCCASSIONAL CAFFEINE USE: CHOCOLATE ONLY   Vaping Use    Vaping Use: Never used   Substance and Sexual Activity    Alcohol use: No    Drug use: No    Sexual activity: Defer       Family History   Problem Relation Age of Onset    Hypertension Mother     Heart disease Mother     Skin cancer Mother     Heart disease Father     Heart attack Father     Skin cancer Maternal Grandmother     No Known Problems Maternal Grandfather     Arthritis Paternal Grandmother     Heart disease Paternal Grandfather     Ovarian cancer Maternal Aunt     Breast cancer Neg Hx     Malig Hyperthermia Neg Hx        Review of Systems   Constitutional: Negative.   Cardiovascular: Negative.  Negative for chest pain, dyspnea on exertion, leg swelling, orthopnea, " "paroxysmal nocturnal dyspnea and syncope.   Respiratory: Negative.     Hematologic/Lymphatic: Negative for bleeding problem.   Musculoskeletal:  Positive for arthritis. Negative for falls.   Gastrointestinal:  Negative for melena.   Neurological:  Negative for dizziness and light-headedness.       Allergies   Allergen Reactions    Duloxetine      Leg swelling    Lisinopril      Leg swelling    Losartan Myalgia    Mobic [Meloxicam] Other (See Comments) and GI Intolerance     increased heart rate and elevated blood pressure    Morphine Nausea And Vomiting    Pseudoephedrine Other (See Comments)     Patient states either caused leg swelling or GI intolerance    Codeine Nausea And Vomiting    Levofloxacin Swelling    Sulfa Antibiotics Nausea And Vomiting         Current Outpatient Medications:     aspirin (aspirin) 81 MG EC tablet, Take 1 tablet by mouth Daily. (Patient taking differently: Take 1 tablet by mouth Daily. TO HOLD 1 WEEK BEFORE SURGERY PER DR MONTIEL (ADVISED PT CONTACT CARDIOLOGY REGARDING HOLDING PRIOR TO SURGERY)), Disp:  , Rfl:     Cholecalciferol (VITAMIN D3 PO), Take 1,000 Units by mouth Daily., Disp: , Rfl:     flecainide (TAMBOCOR) 50 MG tablet, Take 1 tablet by mouth 2 (Two) Times a Day., Disp: 180 tablet, Rfl: 2    pravastatin (PRAVACHOL) 20 MG tablet, Take 1 tablet by mouth Every Night., Disp: , Rfl:     valsartan (DIOVAN) 160 MG tablet, Take 1 tablet by mouth Daily., Disp: , Rfl:       Objective:     Vitals:    01/23/24 1051   BP: 130/76   Pulse: 63   Weight: 73.9 kg (163 lb)   Height: 152.4 cm (60\")     Body mass index is 31.83 kg/m².    PHYSICAL EXAM:    Neck:      Vascular: No JVD.   Pulmonary:      Effort: Pulmonary effort is normal.      Breath sounds: Normal breath sounds.   Cardiovascular:      Normal rate. Regular rhythm.      Murmurs: There is no murmur.      No gallop.  No click. No rub.   Pulses:     Intact distal pulses.           ECG 12 Lead    Date/Time: 1/23/2024 10:59 " AM  Performed by: Selena Johnson APRN    Authorized by: Selena Johnson APRN  Comparison: compared with previous ECG from 1/20/2023  Similar to previous ECG  Rhythm: sinus rhythm  Rate: normal  BPM: 63  Conduction: non-specific intraventricular conduction delay  Other findings: U wave            Assessment:       Diagnosis Plan   1. Paroxysmal atrial fibrillation  ECG 12 Lead    Magnesium    Comprehensive Metabolic Panel      2. Essential hypertension          Orders Placed This Encounter   Procedures    Magnesium     Standing Status:   Future     Standing Expiration Date:   1/23/2025     Order Specific Question:   Release to patient     Answer:   Routine Release [[id number]]    Comprehensive Metabolic Panel     Standing Status:   Future     Standing Expiration Date:   1/23/2025     Order Specific Question:   Release to patient     Answer:   Routine Release [[id number]]    ECG 12 Lead     This order was created via procedure documentation     Order Specific Question:   Release to patient     Answer:   Routine Release [[id number]]          Plan:       1.  Paroxysmal atrial fibrillation.  She is maintaining sinus rhythm with flecainide.  She has declined anticoagulation.  She does have pronounced U waves on her EKG today which I reviewed with Dr. Linda.  We will check a CMP and mag.      2.  Hypertension.  Her blood pressure is stable.  Continue valsartan.      I think she is doing well.  I will call her with her lab results.  Otherwise, she will follow-up with Dr. Linda in 1 year.      As always, it has been a pleasure to participate in your patient's care.      Sincerely,         LESLIE Rivas   NA

## 2024-02-14 ENCOUNTER — TRANSCRIBE ORDERS (OUTPATIENT)
Dept: ADMINISTRATIVE | Facility: HOSPITAL | Age: 79
End: 2024-02-14
Payer: MEDICARE

## 2024-02-14 DIAGNOSIS — Z12.31 VISIT FOR SCREENING MAMMOGRAM: Primary | ICD-10-CM

## 2024-02-29 ENCOUNTER — HOSPITAL ENCOUNTER (OUTPATIENT)
Dept: MAMMOGRAPHY | Facility: HOSPITAL | Age: 79
Discharge: HOME | End: 2024-02-29
Admitting: FAMILY MEDICINE
Payer: MEDICARE

## 2024-02-29 DIAGNOSIS — Z12.31 VISIT FOR SCREENING MAMMOGRAM: ICD-10-CM

## 2024-02-29 PROCEDURE — 77063 BREAST TOMOSYNTHESIS BI: CPT

## 2024-02-29 PROCEDURE — 77067 SCR MAMMO BI INCL CAD: CPT

## 2024-03-05 ENCOUNTER — TRANSCRIBE ORDERS (OUTPATIENT)
Dept: ADMINISTRATIVE | Facility: HOSPITAL | Age: 79
End: 2024-03-05
Payer: MEDICARE

## 2024-03-05 DIAGNOSIS — R92.8 ABNORMAL MAMMOGRAM: Primary | ICD-10-CM

## 2024-03-20 ENCOUNTER — HOSPITAL ENCOUNTER (OUTPATIENT)
Dept: ULTRASOUND IMAGING | Facility: HOSPITAL | Age: 79
Discharge: HOME | End: 2024-03-20
Payer: MEDICARE

## 2024-03-20 ENCOUNTER — HOSPITAL ENCOUNTER (OUTPATIENT)
Dept: MAMMOGRAPHY | Facility: HOSPITAL | Age: 79
Discharge: HOME | End: 2024-03-20
Payer: MEDICARE

## 2024-03-20 DIAGNOSIS — R92.8 ABNORMAL MAMMOGRAM: ICD-10-CM

## 2024-03-20 PROCEDURE — G0279 TOMOSYNTHESIS, MAMMO: HCPCS

## 2024-03-20 PROCEDURE — 76642 ULTRASOUND BREAST LIMITED: CPT

## 2024-03-20 PROCEDURE — 77065 DX MAMMO INCL CAD UNI: CPT

## 2024-03-22 ENCOUNTER — TRANSCRIBE ORDERS (OUTPATIENT)
Dept: ADMINISTRATIVE | Facility: HOSPITAL | Age: 79
End: 2024-03-22
Payer: MEDICARE

## 2024-03-22 DIAGNOSIS — R92.8 ABNORMAL MAMMOGRAM: Primary | ICD-10-CM

## 2024-09-23 ENCOUNTER — HOSPITAL ENCOUNTER (OUTPATIENT)
Dept: MAMMOGRAPHY | Facility: HOSPITAL | Age: 79
Discharge: HOME | End: 2024-09-23
Payer: MEDICARE

## 2024-09-23 ENCOUNTER — HOSPITAL ENCOUNTER (OUTPATIENT)
Dept: ULTRASOUND IMAGING | Facility: HOSPITAL | Age: 79
Discharge: HOME | End: 2024-09-23
Payer: MEDICARE

## 2024-09-23 DIAGNOSIS — R92.8 ABNORMAL MAMMOGRAM: ICD-10-CM

## 2024-09-23 PROCEDURE — 77065 DX MAMMO INCL CAD UNI: CPT

## 2024-09-23 PROCEDURE — G0279 TOMOSYNTHESIS, MAMMO: HCPCS

## 2024-09-23 PROCEDURE — 76642 ULTRASOUND BREAST LIMITED: CPT

## 2025-02-04 ENCOUNTER — TRANSCRIBE ORDERS (OUTPATIENT)
Dept: ADMINISTRATIVE | Facility: HOSPITAL | Age: 80
End: 2025-02-04
Payer: MEDICARE

## 2025-02-04 DIAGNOSIS — Z12.31 BREAST CANCER SCREENING BY MAMMOGRAM: Primary | ICD-10-CM

## 2025-03-27 ENCOUNTER — HOSPITAL ENCOUNTER (OUTPATIENT)
Dept: MAMMOGRAPHY | Facility: HOSPITAL | Age: 80
Discharge: HOME | End: 2025-03-27
Admitting: FAMILY MEDICINE
Payer: MEDICARE

## 2025-03-27 DIAGNOSIS — Z12.31 BREAST CANCER SCREENING BY MAMMOGRAM: ICD-10-CM

## 2025-03-27 PROCEDURE — 77067 SCR MAMMO BI INCL CAD: CPT

## 2025-03-27 PROCEDURE — 77063 BREAST TOMOSYNTHESIS BI: CPT

## 2025-03-28 ENCOUNTER — OFFICE VISIT (OUTPATIENT)
Dept: CARDIOLOGY | Age: 80
End: 2025-03-28
Payer: MEDICARE

## 2025-03-28 VITALS
SYSTOLIC BLOOD PRESSURE: 122 MMHG | WEIGHT: 167 LBS | HEART RATE: 56 BPM | HEIGHT: 60 IN | DIASTOLIC BLOOD PRESSURE: 80 MMHG | BODY MASS INDEX: 32.79 KG/M2

## 2025-03-28 DIAGNOSIS — I48.0 PAROXYSMAL ATRIAL FIBRILLATION: Primary | ICD-10-CM

## 2025-03-28 PROCEDURE — 93000 ELECTROCARDIOGRAM COMPLETE: CPT | Performed by: INTERNAL MEDICINE

## 2025-03-28 PROCEDURE — 3074F SYST BP LT 130 MM HG: CPT | Performed by: INTERNAL MEDICINE

## 2025-03-28 PROCEDURE — 3079F DIAST BP 80-89 MM HG: CPT | Performed by: INTERNAL MEDICINE

## 2025-03-28 PROCEDURE — 99214 OFFICE O/P EST MOD 30 MIN: CPT | Performed by: INTERNAL MEDICINE

## 2025-03-28 RX ORDER — FLECAINIDE ACETATE 50 MG/1
25 TABLET ORAL 2 TIMES DAILY
Status: SHIPPED
Start: 2025-03-28

## 2025-03-28 NOTE — PROGRESS NOTES
"Date of Office Visit: 25  Encounter Provider: Mango Linda MD  Place of Service: [facility] CARDIOLOGY  Patient Name: Sera Mccord  :1945  [id number]    Chief Complaint   Patient presents with    Paroxysmal atrial fibrillation    Follow-up     1 year   :     HPI: Sera Mccord is a 79 y.o. female   she has had a history of paroxysmal A. fib that we have managed with flecainide she has had normal LV function normal stress test in the past.  She is has a history of hypertension.  We had her on Xarelto but she could not afford it and she does not want to take warfarin so she has been on a full aspirin a day.      She is here for follow-up and has been feeling well no syncope no palpitations no PND orthopnea edema.  General she has been doing pretty well blood pressures have been good she does not think she has had any A-fib    Past Medical History:   Diagnosis Date    Abnormal finding on lung imaging     Acid reflux     Allergies     Arthritis     DJD Multiple joints spine, shoulders, knees, left hip. 2013 normal joint examination. Patient has 6 positive fibromyalgia tender points.    Atrial fibrillation     Bloated abdomen     Bradycardia     \"LOW RESTING HEART RATE BETWEEN 45-50\"    Colon polyps     Coronary artery disease      markedly abnl stress and nl cath; poss syndrome x  felt terrible with metoprolol    Fibromyalgia     Foot pain     Hemorrhoids     internal and external    Histoplasmosis     History of 2019 novel coronavirus disease (COVID-19) 2022    Hyperlipidemia     Hypertension     IBS (irritable bowel syndrome)     Insomnia     Lung mass     Mediastinal lymphadenopathy     HX    Medicare annual wellness visit, subsequent 05/15/2017    Night sweat     Other disorders of bone development and growth, unspecified site     \"TERE, BONE 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\"    PAF (paroxysmal atrial fibrillation)     Plantar fasciitis      right side    PONV " (postoperative nausea and vomiting)     Restless leg syndrome     RUQ abdominal pain     Skin cancer     HX       Past Surgical History:   Procedure Laterality Date    CARDIAC CATHETERIZATION  2015    COLONOSCOPY N/A 10/24/2016    Procedure: COLONOSCOPY;  Surgeon: Lauren Reich MD;  Location:  [facility];  Service:     COLONOSCOPY N/A 01/31/2022    Procedure: COLONOSCOPY to CECUM;  Surgeon: Lauren Reich MD;  Location:  [facility];  Service: General;  Laterality: N/A;  SCREENING, ABD PAIN  --DIVERTICULOSIS    COLONOSCOPY W/ POLYPECTOMY  2013    Dr. Lauren Reich    HYSTERECTOMY  1986    SKIN CANCER EXCISION      nose    TONSILLECTOMY      UPPER GASTROINTESTINAL ENDOSCOPY  11/2015    Dr. Lauren Reich       Social History     Socioeconomic History    Marital status:    Tobacco Use    Smoking status: Never     Passive exposure: Never    Smokeless tobacco: Never    Tobacco comments:     OCCASSIONAL CAFFEINE USE: CHOCOLATE ONLY   Vaping Use    Vaping status: Never Used   Substance and Sexual Activity    Alcohol use: No    Drug use: No    Sexual activity: Not Currently     Partners: Male       Family History   Problem Relation Age of Onset    Hypertension Mother     Heart disease Mother     Skin cancer Mother     Heart disease Father     Heart attack Father     Skin cancer Maternal Grandmother     No Known Problems Maternal Grandfather     Arthritis Paternal Grandmother     Heart disease Paternal Grandfather     Ovarian cancer Maternal Aunt     Breast cancer Neg Hx     Malig Hyperthermia Neg Hx        Review of Systems   Constitutional: Negative for decreased appetite, fever, malaise/fatigue and weight loss.   HENT:  Negative for nosebleeds.    Eyes:  Negative for double vision.   Cardiovascular:  Negative for chest pain, claudication, cyanosis, dyspnea on exertion, irregular heartbeat, leg swelling, near-syncope, orthopnea, palpitations, paroxysmal nocturnal dyspnea and syncope.   Respiratory:   "Negative for cough, hemoptysis and shortness of breath.    Hematologic/Lymphatic: Negative for bleeding problem.   Skin:  Negative for rash.   Musculoskeletal:  Negative for falls and myalgias.   Gastrointestinal:  Negative for hematochezia, jaundice, melena, nausea and vomiting.   Genitourinary:  Negative for hematuria.   Neurological:  Negative for dizziness and seizures.   Psychiatric/Behavioral:  Negative for altered mental status and memory loss.        Allergies   Allergen Reactions    Duloxetine      Leg swelling    Lisinopril      Leg swelling    Losartan Myalgia    Mobic [Meloxicam] Other (See Comments) and GI Intolerance     increased heart rate and elevated blood pressure    Morphine Nausea And Vomiting    Pseudoephedrine Other (See Comments)     Patient states either caused leg swelling or GI intolerance    Codeine Nausea And Vomiting    Levofloxacin Swelling    Sulfa Antibiotics Nausea And Vomiting         Current Outpatient Medications:     aspirin (aspirin) 81 MG EC tablet, Take 1 tablet by mouth Daily., Disp:  , Rfl:     Cholecalciferol (VITAMIN D3 PO), Take 1,000 Units by mouth Daily., Disp: , Rfl:     flecainide (TAMBOCOR) 50 MG tablet, Take 1 tablet by mouth 2 (Two) Times a Day., Disp: 180 tablet, Rfl: 2    pravastatin (PRAVACHOL) 20 MG tablet, Take 1 tablet by mouth Every Night., Disp: , Rfl:     valsartan (DIOVAN) 160 MG tablet, Take 1 tablet by mouth Daily., Disp: , Rfl:       Objective:     Vitals:    03/28/25 1138   BP: 122/80   Pulse: 56   Weight: 75.8 kg (167 lb)   Height: 152.4 cm (60\")     Body mass index is 32.61 kg/m².    Constitutional:       Appearance: Well-developed.   Eyes:      General: No scleral icterus.  HENT:      Head: Normocephalic.   Neck:      Thyroid: No thyromegaly.      Vascular: No JVD.      Lymphadenopathy: No cervical adenopathy.   Pulmonary:      Effort: Pulmonary effort is normal.      Breath sounds: Normal breath sounds. No wheezing. No rales.   Cardiovascular:    "   Normal rate. Regular rhythm.      No gallop.    Edema:     Peripheral edema absent.   Abdominal:      Palpations: Abdomen is soft.      Tenderness: There is no abdominal tenderness.   Musculoskeletal: Normal range of motion. Skin:     General: Skin is warm and dry.      Findings: No rash.   Neurological:      Mental Status: Alert and oriented to person, place, and time.           ECG 12 Lead    Date/Time: 3/28/2025 12:25 PM  Performed by: Mango Linda MD    Authorized by: Mango Linda MD  Comparison: compared with previous ECG   Rhythm: sinus rhythm  Comments: New Wenke Bach AV block           Assessment:       Diagnosis Plan   1. Paroxysmal atrial fibrillation               Plan:       She had symptomatic A-fib we have had her on flecainide for years and she has been fine she still is asymptomatic but on her ECG today she has Wenke Bach block.  Her QRS is narrow.  She is asymptomatic with it I am going to decrease the flecainide to 25 twice a day and see how she does with that if she obviously gets dizzy has syncopal episode anything like that I told her to call us and come see us sooner but we will see her back in a year    No follow-ups on file.     As always, it has been a pleasure to participate in your patient's care.      Sincerely,       Mango Linda MD

## 2025-07-30 ENCOUNTER — TELEPHONE (OUTPATIENT)
Dept: CARDIOLOGY | Age: 80
End: 2025-07-30
Payer: MEDICARE

## 2025-07-30 ENCOUNTER — OFFICE VISIT (OUTPATIENT)
Age: 80
End: 2025-07-30
Payer: MEDICARE

## 2025-07-30 VITALS
SYSTOLIC BLOOD PRESSURE: 172 MMHG | DIASTOLIC BLOOD PRESSURE: 82 MMHG | HEART RATE: 61 BPM | HEIGHT: 60 IN | BODY MASS INDEX: 32.61 KG/M2

## 2025-07-30 DIAGNOSIS — I48.0 PAROXYSMAL ATRIAL FIBRILLATION: Primary | ICD-10-CM

## 2025-07-30 DIAGNOSIS — R55 POSTURAL DIZZINESS WITH PRESYNCOPE: ICD-10-CM

## 2025-07-30 DIAGNOSIS — R42 POSTURAL DIZZINESS WITH PRESYNCOPE: ICD-10-CM

## 2025-07-30 PROCEDURE — 1159F MED LIST DOCD IN RCRD: CPT | Performed by: NURSE PRACTITIONER

## 2025-07-30 PROCEDURE — 1160F RVW MEDS BY RX/DR IN RCRD: CPT | Performed by: NURSE PRACTITIONER

## 2025-07-30 PROCEDURE — 99214 OFFICE O/P EST MOD 30 MIN: CPT | Performed by: NURSE PRACTITIONER

## 2025-07-30 PROCEDURE — 3079F DIAST BP 80-89 MM HG: CPT | Performed by: NURSE PRACTITIONER

## 2025-07-30 PROCEDURE — 3077F SYST BP >= 140 MM HG: CPT | Performed by: NURSE PRACTITIONER

## 2025-07-30 NOTE — TELEPHONE ENCOUNTER
Returned call to Sera Mccord and she is agreeable to see APRMICHAEL Liza Pool today at 1130 am.  Reviewed office address and suite number with her and she verbalized understanding.  Requested patient bring copy of EKG to the appointment and she stated she will do this.    Called [facility] and spoke with Jessica.  Notified that patient is scheduled for an office visit today and requested office provide patient with copy of EKG to bring to the appointment.  Jessica verbalized understanding and stated she will communicate this to the provider.    Thank you,  Latia ROMANO RN  [place] [facility] [place]  07[place]30/25 09:39 EDT

## 2025-07-30 NOTE — PROGRESS NOTES
Subjective:     Encounter Date:07/30/2025      Patient ID: Sera Mccord is a 80 y.o. female.    Chief Complaint:dizziness/nausea  History of Present Illness  This is a 80-year-old female who follows with Dr. Linda and is new to me today.  She has a past medical history of paroxysmal atrial fibrillation.  She is on flecainide but is not on anticoagulation.  She was previously on Xarelto but could not afford it and did not want to take warfarin.  So she has been taking aspirin instead.    She was last seen by Dr. Linda in March 2025 and was doing good at that time.  She was noted to have new Wenchebach on her EKG so her flecainide was decreased to 25 mg twice a day.    We were contacted by nurse practitioner at [facility] urgent care this morning.  The patient had called in with complaints of dizziness and near syncope.  An EKG was obtained that showed the patient's heart rate was 46 so she contacted good our office to see if we can get her in for a same-day appointment.  The patient tells me this morning she was cutting her pills in half and was overcome with a flushed feeling in her head.  Her right arm felt like it was going to sleep and she became diaphoretic and felt like she was going to pass out.  She denies marco antonio syncope.  She feels like there is squeezing in her head and when she turns her head it makes her neck hurt.  She denies any vision changes.  Blood pressures at home have been in the 120s to 130s systolic.  It was elevated upon arrival here.  I did recheck it and it was 138/62.  It was normal when she was seen in urgent care.  She said even when she is having the normal blood pressure readings today she still having the headache.  She says she has not been sick recently and has had no diarrhea or vomiting.  She says she feels like she could be maybe a little dehydrated.  She also complains of pain in her neck.  She says she has cervical issues and she also has fibromyalgia so she hurts all  the time.  She says she has not been drinking plenty of fluids.  She says that in the morning she will take her valsartan and take her flecainide a few hours later.  She has been having nausea after taking the valsartan in the morning.  No complaints of palpitations.  No swelling in her legs, orthopnea or PND.  Orthostatic vitals were negative in the office.    I have reviewed and updated as appropriate allergies, current medications, past family history, past medical history, past surgical history and problem list.    Review of Systems   Constitutional: Negative for fever, malaise/fatigue, weight gain and weight loss.   HENT:  Negative for congestion, hoarse voice and sore throat.    Eyes:  Negative for blurred vision and double vision.   Cardiovascular:  Negative for chest pain, dyspnea on exertion, leg swelling, orthopnea, palpitations and syncope.   Respiratory:  Negative for cough, shortness of breath and wheezing.    Musculoskeletal:  Positive for neck pain.   Gastrointestinal:  Negative for abdominal pain, hematemesis, hematochezia and melena.   Genitourinary:  Negative for dysuria and hematuria.   Neurological:  Positive for dizziness and headaches. Negative for light-headedness and numbness.   Psychiatric/Behavioral:  Negative for depression. The patient is not nervous/anxious.          Current Outpatient Medications:     aspirin (aspirin) 81 MG EC tablet, Take 1 tablet by mouth Daily., Disp:  , Rfl:     Cholecalciferol (VITAMIN D3 PO), Take 1,000 Units by mouth Daily., Disp: , Rfl:     flecainide (TAMBOCOR) 50 MG tablet, Take 0.5 tablets by mouth 2 (Two) Times a Day., Disp: , Rfl:     pravastatin (PRAVACHOL) 20 MG tablet, Take 1 tablet by mouth Every Night., Disp: , Rfl:     valsartan (DIOVAN) 160 MG tablet, Take 1 tablet by mouth Daily., Disp: , Rfl:     Past Medical History:   Diagnosis Date    Abnormal finding on lung imaging     Acid reflux     Allergies     Arthritis     DJD Multiple joints spine,  "shoulders, knees, left hip. December 2013 normal joint examination. Patient has 6 positive fibromyalgia tender points.    Atrial fibrillation     Bloated abdomen     Bradycardia     \"LOW RESTING HEART RATE BETWEEN 45-50\"    Colon polyps     Coronary artery disease     12/14 markedly abnl stress and nl cath; poss syndrome x  felt terrible with metoprolol    Fibromyalgia     Foot pain     Hemorrhoids     internal and external    Histoplasmosis     History of 2019 novel coronavirus disease (COVID-19) 07/2022    Hyperlipidemia     Hypertension     IBS (irritable bowel syndrome)     Insomnia     Lung mass     Mediastinal lymphadenopathy     HX    Medicare annual wellness visit, subsequent 05/15/2017    Night sweat     Other disorders of bone development and growth, unspecified site     \"TERE, BONE 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\"    PAF (paroxysmal atrial fibrillation)     Plantar fasciitis     12/13 right side    PONV (postoperative nausea and vomiting)     Restless leg syndrome     RUQ abdominal pain     Skin cancer     HX       Past Surgical History:   Procedure Laterality Date    CARDIAC CATHETERIZATION  2015    COLONOSCOPY N/A 10/24/2016    Procedure: COLONOSCOPY;  Surgeon: Lauren Reich MD;  Location: [facility] ENDOSCOPY;  Service:     COLONOSCOPY N/A 01/31/2022    Procedure: COLONOSCOPY to CECUM;  Surgeon: Lauren Reich MD;  Location: [facility] ENDOSCOPY;  Service: General;  Laterality: N/A;  SCREENING, ABD PAIN  --DIVERTICULOSIS    COLONOSCOPY W/ POLYPECTOMY  2013    Dr. Lauren Reich    HYSTERECTOMY  1986    SKIN CANCER EXCISION      nose    TONSILLECTOMY      UPPER GASTROINTESTINAL ENDOSCOPY  11/2015    Dr. Lauren Reich       Family History   Problem Relation Age of Onset    Hypertension Mother     Heart disease Mother     Skin cancer Mother     Heart disease Father     Heart attack Father     Skin cancer Maternal Grandmother     No Known Problems Maternal Grandfather     Arthritis Paternal Grandmother     Heart disease " "Paternal Grandfather     Ovarian cancer Maternal Aunt     Breast cancer Neg Hx     Malig Hyperthermia Neg Hx        Social History     Tobacco Use    Smoking status: Never     Passive exposure: Never    Smokeless tobacco: Never    Tobacco comments:     OCCASSIONAL CAFFEINE USE: CHOCOLATE ONLY   Vaping Use    Vaping status: Never Used   Substance Use Topics    Alcohol use: No    Drug use: No       Procedures       Objective:     Visit Vitals  /82 (BP Location: Right arm, Patient Position: Lying)   Pulse 61   Ht 152.4 cm (60\")   BMI 32.61 kg/m²             Physical Exam  Constitutional:       Appearance: Normal appearance. She is obese.   HENT:      Head: Normocephalic.   Neck:      Vascular: No carotid bruit.   Cardiovascular:      Rate and Rhythm: Normal rate and regular rhythm.      Chest Wall: PMI is not displaced.      Pulses: Normal pulses.           Radial pulses are 2+ on the right side and 2+ on the left side.        Posterior tibial pulses are 2+ on the right side and 2+ on the left side.      Heart sounds: Normal heart sounds. No murmur heard.     No friction rub. No gallop.   Pulmonary:      Effort: Pulmonary effort is normal.      Breath sounds: Normal breath sounds.   Abdominal:      General: Bowel sounds are normal. There is no distension.      Palpations: Abdomen is soft.   Musculoskeletal:      Right lower leg: No edema.      Left lower leg: No edema.   Skin:     General: Skin is warm and dry.      Capillary Refill: Capillary refill takes less than 2 seconds.   Neurological:      Mental Status: She is alert and oriented to person, place, and time.   Psychiatric:         Mood and Affect: Mood normal.         Behavior: Behavior normal.         Thought Content: Thought content normal.          Lab Review:         Cardiac Procedures:       Assessment:         There are no diagnoses linked to this encounter.        Plan:       Bradycardia: heart rate was 46 on EKG today. I do not see any evidence of " Eduardo, it appears to be sinus bradycardia. Flecainide is not likely causing the lower heart rate.She is not on any beta blocker therapy as she was intolerant in the past. I think we should place a monitor to see if there is any development of sinus dysfunction possibly.  Headache/right arm paraesthesia: headache persist but right arm paraesthesia has resolved. Obviously there is some concern for stroke as she does have a history of afib and is not on adequate anticoagulation, per the patient's preference. Neuro exam was unremarkable. BP is normal. If the headache gets worse, I think she needs to go to the ED.   Near syncope/dizziness: orthostatics were negative. BP is stable on my recheck. I think it was elevated when she came in due to stress. It was normal in  today as well and she has normal readings at home. Will see what monitor shows.    Thank you for allowing me to participate in this patient's care. Please call with any questions or concerns. Will determine follow up once monitor results.          Your medication list            Accurate as of July 30, 2025 11:40 AM. If you have any questions, ask your nurse or doctor.                CONTINUE taking these medications        Instructions Last Dose Given Next Dose Due   aspirin 81 MG EC tablet      Take 1 tablet by mouth Daily.       flecainide 50 MG tablet  Commonly known as: TAMBOCOR      Take 0.5 tablets by mouth 2 (Two) Times a Day.       pravastatin 20 MG tablet  Commonly known as: PRAVACHOL      Take 1 tablet by mouth Every Night.       valsartan 160 MG tablet  Commonly known as: DIOVAN      Take 1 tablet by mouth Daily.       VITAMIN D3 PO      Take 1,000 Units by mouth Daily.                  LESLIE Collins  07/30/25  11:40 AM EDT

## 2025-07-30 NOTE — TELEPHONE ENCOUNTER
Grecia (APRN at [facility], [phone]) called regarding Sera Mccord.  Patient is currently in facility with the following:      Grecia states she reviewed Dr. Linda's last office note which states patient should return to office for f/u if she experiences any dizziness/syncope, which is why she is calling to request a same day office visit for patient.  Grecia has not assessed patient yet and was just performing her chart review.  She will fax EKG to office for provider to review.    There are limited openings today.    Please let me know how you would like to proceed.    Thank you,  Latia ROMANO RN  Triage Nurse [place]  07/30/25  09:23 EDT

## (undated) DEVICE — KT ORCA ORCAPOD DISP STRL

## (undated) DEVICE — ADAPT CLN BIOGUARD AIR/H2O DISP

## (undated) DEVICE — CANN O2 ETCO2 FITS ALL CONN CO2 SMPL A/ 7IN DISP LF

## (undated) DEVICE — TUBING, SUCTION, 1/4" X 10', STRAIGHT: Brand: MEDLINE

## (undated) DEVICE — SENSR O2 OXIMAX FNGR A/ 18IN NONSTR

## (undated) DEVICE — LN SMPL CO2 SHTRM SD STREAM W/M LUER